# Patient Record
Sex: FEMALE | Race: WHITE | NOT HISPANIC OR LATINO | Employment: FULL TIME | URBAN - METROPOLITAN AREA
[De-identification: names, ages, dates, MRNs, and addresses within clinical notes are randomized per-mention and may not be internally consistent; named-entity substitution may affect disease eponyms.]

---

## 2017-01-30 RX ORDER — LEVOTHYROXINE SODIUM 150 UG/1
TABLET ORAL
Qty: 90 TABLET | Refills: 1 | Status: SHIPPED | OUTPATIENT
Start: 2017-01-30 | End: 2017-02-07 | Stop reason: SDUPTHER

## 2017-02-03 ENCOUNTER — CLINICAL SUPPORT (OUTPATIENT)
Dept: CARDIOLOGY | Facility: CLINIC | Age: 47
End: 2017-02-03
Payer: COMMERCIAL

## 2017-02-03 ENCOUNTER — NUTRITION (OUTPATIENT)
Dept: DIABETES | Facility: CLINIC | Age: 47
End: 2017-02-03
Payer: COMMERCIAL

## 2017-02-03 ENCOUNTER — CLINICAL SUPPORT (OUTPATIENT)
Dept: INTERNAL MEDICINE | Facility: CLINIC | Age: 47
End: 2017-02-03

## 2017-02-03 ENCOUNTER — OFFICE VISIT (OUTPATIENT)
Dept: INTERNAL MEDICINE | Facility: CLINIC | Age: 47
End: 2017-02-03
Payer: COMMERCIAL

## 2017-02-03 ENCOUNTER — HOSPITAL ENCOUNTER (OUTPATIENT)
Dept: RADIOLOGY | Facility: HOSPITAL | Age: 47
Discharge: HOME | End: 2017-02-03
Attending: INTERNAL MEDICINE
Payer: COMMERCIAL

## 2017-02-03 VITALS
WEIGHT: 198 LBS | BODY MASS INDEX: 31.82 KG/M2 | DIASTOLIC BLOOD PRESSURE: 68 MMHG | HEIGHT: 66 IN | SYSTOLIC BLOOD PRESSURE: 117 MMHG

## 2017-02-03 VITALS
SYSTOLIC BLOOD PRESSURE: 100 MMHG | HEIGHT: 66 IN | DIASTOLIC BLOOD PRESSURE: 70 MMHG | BODY MASS INDEX: 31.53 KG/M2 | HEART RATE: 72 BPM | RESPIRATION RATE: 12 BRPM | WEIGHT: 196.19 LBS

## 2017-02-03 VITALS
WEIGHT: 196.19 LBS | HEIGHT: 66 IN | RESPIRATION RATE: 12 BRPM | SYSTOLIC BLOOD PRESSURE: 100 MMHG | TEMPERATURE: 97 F | DIASTOLIC BLOOD PRESSURE: 70 MMHG | BODY MASS INDEX: 31.53 KG/M2 | HEART RATE: 72 BPM

## 2017-02-03 DIAGNOSIS — Z00.00 ROUTINE GENERAL MEDICAL EXAMINATION AT A HEALTH CARE FACILITY: ICD-10-CM

## 2017-02-03 DIAGNOSIS — Z00.00 ROUTINE GENERAL MEDICAL EXAMINATION AT A HEALTH CARE FACILITY: Primary | ICD-10-CM

## 2017-02-03 DIAGNOSIS — E03.9 ACQUIRED HYPOTHYROIDISM: Primary | ICD-10-CM

## 2017-02-03 DIAGNOSIS — Z00.00 ENCOUNTER FOR PREVENTIVE HEALTH EXAMINATION: ICD-10-CM

## 2017-02-03 DIAGNOSIS — M25.552 PELVIC JOINT PAIN, LEFT: ICD-10-CM

## 2017-02-03 DIAGNOSIS — Z80.41 FH: OVARIAN CANCER: ICD-10-CM

## 2017-02-03 DIAGNOSIS — Z00.8 NUTRITIONAL ASSESSMENT: Primary | ICD-10-CM

## 2017-02-03 LAB
ALBUMIN SERPL BCP-MCNC: 3.7 G/DL
ALP SERPL-CCNC: 79 U/L
ALT SERPL W/O P-5'-P-CCNC: 20 U/L
ANION GAP SERPL CALC-SCNC: 12 MMOL/L
AST SERPL-CCNC: 20 U/L
BILIRUB SERPL-MCNC: 0.3 MG/DL
BUN SERPL-MCNC: 10 MG/DL
CALCIUM SERPL-MCNC: 9.6 MG/DL
CHLORIDE SERPL-SCNC: 106 MMOL/L
CHOLEST/HDLC SERPL: 3.1 {RATIO}
CO2 SERPL-SCNC: 23 MMOL/L
CREAT SERPL-MCNC: 0.8 MG/DL
ERYTHROCYTE [DISTWIDTH] IN BLOOD BY AUTOMATED COUNT: 12.5 %
EST. GFR  (AFRICAN AMERICAN): >60 ML/MIN/1.73 M^2
EST. GFR  (NON AFRICAN AMERICAN): >60 ML/MIN/1.73 M^2
GLUCOSE SERPL-MCNC: 94 MG/DL
HCT VFR BLD AUTO: 39.6 %
HDL/CHOLESTEROL RATIO: 32.8 %
HDLC SERPL-MCNC: 174 MG/DL
HDLC SERPL-MCNC: 57 MG/DL
HGB BLD-MCNC: 13.4 G/DL
LDLC SERPL CALC-MCNC: 104 MG/DL
MCH RBC QN AUTO: 30.7 PG
MCHC RBC AUTO-ENTMCNC: 33.8 %
MCV RBC AUTO: 91 FL
NONHDLC SERPL-MCNC: 117 MG/DL
PLATELET # BLD AUTO: 336 K/UL
PMV BLD AUTO: 11.1 FL
POTASSIUM SERPL-SCNC: 4 MMOL/L
PROT SERPL-MCNC: 7 G/DL
RBC # BLD AUTO: 4.36 M/UL
SODIUM SERPL-SCNC: 141 MMOL/L
T4 FREE SERPL-MCNC: 1.07 NG/DL
TRIGL SERPL-MCNC: 65 MG/DL
TSH SERPL DL<=0.005 MIU/L-ACNC: 7.02 UIU/ML
WBC # BLD AUTO: 5.16 K/UL

## 2017-02-03 PROCEDURE — 90471 IMMUNIZATION ADMIN: CPT | Mod: PBBFAC,PO | Performed by: INTERNAL MEDICINE

## 2017-02-03 PROCEDURE — 71020 XR CHEST PA AND LATERAL: CPT | Mod: 26,,, | Performed by: RADIOLOGY

## 2017-02-03 PROCEDURE — 71020 XR CHEST PA AND LATERAL: CPT | Mod: TC,PO

## 2017-02-03 PROCEDURE — 83036 HEMOGLOBIN GLYCOSYLATED A1C: CPT

## 2017-02-03 PROCEDURE — 93017 CV STRESS TEST TRACING ONLY: CPT | Mod: PBBFAC,PO | Performed by: INTERNAL MEDICINE

## 2017-02-03 PROCEDURE — 85027 COMPLETE CBC AUTOMATED: CPT | Mod: PO

## 2017-02-03 PROCEDURE — 99999 PR PBB SHADOW E&M-EST. PATIENT-LVL II: CPT | Mod: PBBFAC,,, | Performed by: DIETITIAN, REGISTERED

## 2017-02-03 PROCEDURE — 93010 ELECTROCARDIOGRAM REPORT: CPT | Mod: S$PBB,,, | Performed by: INTERNAL MEDICINE

## 2017-02-03 PROCEDURE — 84439 ASSAY OF FREE THYROXINE: CPT

## 2017-02-03 PROCEDURE — 97802 MEDICAL NUTRITION INDIV IN: CPT | Mod: PBBFAC,PO | Performed by: DIETITIAN, REGISTERED

## 2017-02-03 PROCEDURE — 90715 TDAP VACCINE 7 YRS/> IM: CPT | Mod: PBBFAC,PO | Performed by: INTERNAL MEDICINE

## 2017-02-03 PROCEDURE — 93016 CV STRESS TEST SUPVJ ONLY: CPT | Mod: S$PBB,,, | Performed by: INTERNAL MEDICINE

## 2017-02-03 PROCEDURE — 80061 LIPID PANEL: CPT | Mod: PO

## 2017-02-03 PROCEDURE — 93005 ELECTROCARDIOGRAM TRACING: CPT | Mod: PBBFAC,PO | Performed by: INTERNAL MEDICINE

## 2017-02-03 PROCEDURE — 99999 PR PBB SHADOW E&M-EST. PATIENT-LVL III: CPT | Mod: PBBFAC,,, | Performed by: INTERNAL MEDICINE

## 2017-02-03 PROCEDURE — 99396 PREV VISIT EST AGE 40-64: CPT | Mod: S$PBB,,, | Performed by: INTERNAL MEDICINE

## 2017-02-03 PROCEDURE — 80053 COMPREHEN METABOLIC PANEL: CPT | Mod: PO

## 2017-02-03 PROCEDURE — 86703 HIV-1/HIV-2 1 RESULT ANTBDY: CPT

## 2017-02-03 PROCEDURE — 84443 ASSAY THYROID STIM HORMONE: CPT

## 2017-02-03 PROCEDURE — 93018 CV STRESS TEST I&R ONLY: CPT | Mod: S$PBB,,, | Performed by: INTERNAL MEDICINE

## 2017-02-03 NOTE — PROGRESS NOTES
"Subjective:       Patient ID: Bing Mclaughlin is a 46 y.o. female.    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  Menstrual migraines, relief with fioricet.  Energy good.  Walks 3x per week on treadmill.  Some LBP x 2 years, worse with some exercises.  No LE radiation.  Also left pelvic area pain.  No f/c/sw/cough.  No cp/sob/palp.  BMs normal.  Urine normal.    Past Medical History:    Hypothyroid                                                   Migraines                                                     Past Surgical History:    TUBAL LIGATION                                                 WRIST SURGERY                                                    Comment:right, cyst removed.    SH:  No tob, occas EtOH, ,  in chem plant.    FH:  Mother ovarian CA at 63, cousin with leukemia, cousin breast CA.    HM:  No fluvax this season, 1/17 today TDaP, 1/16 MMG, 4/16 Gyn Dr. Hackett.    Review of Systems   Constitutional: Negative for appetite change, chills, diaphoresis and fever.   HENT: Negative for congestion, ear pain, rhinorrhea, sinus pressure and sore throat.    Respiratory: Negative for cough, chest tightness and shortness of breath.    Cardiovascular: Negative for chest pain and palpitations.   Gastrointestinal: Negative for blood in stool, constipation, diarrhea, nausea and vomiting.   Genitourinary: Negative for dysuria, frequency, hematuria, menstrual problem, urgency and vaginal discharge.   Musculoskeletal: Negative for arthralgias.   Skin: Negative for rash.   Neurological: Negative for dizziness and headaches.   Psychiatric/Behavioral: Negative for sleep disturbance. The patient is not nervous/anxious.        Objective:     Visit Vitals    /70    Pulse 72    Temp 96.6 °F (35.9 °C) (Tympanic)    Resp 12    Ht 5' 5.5" (1.664 m)    Wt 89 kg (196 lb 3.4 oz)    BMI 32.15 kg/m2       Physical Exam "   Constitutional: She is oriented to person, place, and time. She appears well-developed and well-nourished.   HENT:   Right Ear: External ear normal. Tympanic membrane is not injected.   Left Ear: External ear normal. Tympanic membrane is not injected.   Mouth/Throat: Oropharynx is clear and moist.   Eyes: Conjunctivae are normal.   Neck: Normal range of motion. Neck supple. No thyromegaly present.   Cardiovascular: Normal rate, regular rhythm and intact distal pulses.  Exam reveals no gallop and no friction rub.    No murmur heard.  Pulmonary/Chest: Effort normal and breath sounds normal. She has no wheezes. She has no rales.   Abdominal: Soft. Bowel sounds are normal. She exhibits no mass. There is no tenderness.   Musculoskeletal: She exhibits no edema.   Lymphadenopathy:     She has no cervical adenopathy.   Neurological: She is alert and oriented to person, place, and time.   Skin: Skin is warm. No rash noted.   Psychiatric: She has a normal mood and affect.       Results for orders placed or performed in visit on 02/03/17   Comprehensive metabolic panel   Result Value Ref Range    Sodium 141 136 - 145 mmol/L    Potassium 4.0 3.5 - 5.1 mmol/L    Chloride 106 95 - 110 mmol/L    CO2 23 23 - 29 mmol/L    Glucose 94 70 - 110 mg/dL    BUN, Bld 10 6 - 20 mg/dL    Creatinine 0.8 0.5 - 1.4 mg/dL    Calcium 9.6 8.7 - 10.5 mg/dL    Total Protein 7.0 6.0 - 8.4 g/dL    Albumin 3.7 3.5 - 5.2 g/dL    Total Bilirubin 0.3 0.1 - 1.0 mg/dL    Alkaline Phosphatase 79 55 - 135 U/L    AST 20 10 - 40 U/L    ALT 20 10 - 44 U/L    Anion Gap 12 8 - 16 mmol/L    eGFR if African American >60 >60 mL/min/1.73 m^2    eGFR if non African American >60 >60 mL/min/1.73 m^2   Hematology Profile   Result Value Ref Range    WBC 5.16 3.90 - 12.70 K/uL    RBC 4.36 4.00 - 5.40 M/uL    Hemoglobin 13.4 12.0 - 16.0 g/dL    Hematocrit 39.6 37.0 - 48.5 %    MCV 91 82 - 98 fL    MCH 30.7 27.0 - 31.0 pg    MCHC 33.8 32.0 - 36.0 %    RDW 12.5 11.5 - 14.5 %     Platelets 336 150 - 350 K/uL    MPV 11.1 9.2 - 12.9 fL   Lipid panel   Result Value Ref Range    Cholesterol 174 120 - 199 mg/dL    Triglycerides 65 30 - 150 mg/dL    HDL 57 40 - 75 mg/dL    LDL Cholesterol 104.0 63.0 - 159.0 mg/dL    HDL/Chol Ratio 32.8 20.0 - 50.0 %    Total Cholesterol/HDL Ratio 3.1 2.0 - 5.0    Non-HDL Cholesterol 117 mg/dL     CXR pending.  EKG- normal.  ETT pending.    Assessment:       1. Acquired hypothyroidism    2. Encounter for preventive health examination    3. Pelvic joint pain, left    4. FH: ovarian cancer        Plan:       Bing was seen today for [employer] health.    Diagnoses and all orders for this visit:    Acquired hypothyroidism- await lab.    Pelvic pain and tenderness left side, fh ovarian CA- u/s with [facility].    Encounter for preventive health examination- TDaP now- Report other results when available.

## 2017-02-03 NOTE — MR AVS SNAPSHOT
[facility]  [address] [place] [address]  Phone: [phone]  Fax: [phone]                  Bing Mclaughlin   2/3/2017 10:40 AM   Office Visit    Description:  Female : 1970   Provider:  Kirsten Paniagua MD   Department:  [facility] - Internal Medicine           Reason for Visit     Executive Health           Diagnoses this Visit        Comments    Acquired hypothyroidism    -  Primary     Encounter for preventive health examination         Pelvic joint pain, left         FH: ovarian cancer                To Do List           Future Appointments        Provider Department Dept Phone    2/3/2017 3:30 PM Fely Ojeda RD, CDE [facility] - Diabetes [facility] [phone]    2/10/2017 8:45 AM [facility] [phone]      Goals (5 Years of Data)     None      Follow-Up and Disposition     Return if symptoms worsen or fail to improve.      [facility]     [facility] Nurse Care Line -  Assistance  Registered nurses in the [facility] provide clinical advisement, health education, appointment booking, and other advisory services.  Call for this free service at [phone].             Medications           Message regarding Medications     Verify the changes and/or additions to your medication regime listed below are the same as discussed with your clinician today.  If any of these changes or additions are incorrect, please notify your healthcare provider.             Verify that the below list of medications is an accurate representation of the medications you are currently taking.  If none reported, the list may be blank. If incorrect, please contact your healthcare provider. Carry this list with you in case of emergency.           Current Medications     butalbital-aspirin-caffeine (BUTALBITAL COMPOUND) -40 mg Tab Take 1 tablet by mouth 2 (two) times daily as needed.    levothyroxine (SYNTHROID) 150 MCG tablet  "Take 1 tablet by mouth before breakfast ON AN EMPTY STOMACH           Clinical Reference Information           Your Vitals Were     BP Pulse Temp Resp Height Weight    100/70 72 96.6 °F (35.9 °C) (Tympanic) 12 5' 5.5" (1.664 m) 89 kg (196 lb 3.4 oz)    BMI                32.15 kg/m2          Blood Pressure          Most Recent Value    BP  100/70      Allergies as of 2/3/2017     No Known Allergies      Immunizations Administered on Date of Encounter - 2/3/2017     Name Date Dose VIS Date Route    TDAP 2/3/2017 0.5 mL 2/24/2015 Intramuscular      Orders Placed During Today's Visit      Normal Orders This Visit    Tdap Vaccine (Adult)     Future Labs/Procedures Expected by Expires    US Pelvis Complete Non OB  2/3/2017 5/4/2017      Language Assistance Services     ATTENTION: Language assistance services are available, free of charge. Please call [phone].      ATENCIÓN: Si habla español, tiene a guevara disposición servicios gratuitos de asistencia lingüística. Llame al [phone].     CHÚ Ý: N?u b?n nói Ti?ng Vi?t, có các d?ch v? h? tr? ngôn ng? mi?n phí dành cho b?n. G?i s? [phone].         [facility] complies with applicable Federal civil rights laws and does not discriminate on the basis of race, color, national origin, age, disability, or sex.        "

## 2017-02-03 NOTE — LETTER
February 7, 2017    Bing Mclaughlin  [address]  [place] [address]             [facility]  [address] [place] [address]  Phone: [phone]  Fax: [phone] [facility] #[id number]     Dear Ms. Mclaughlin:    It was a pleasure to meet you and perform your  executive physical on February 3, 2017.  At the time of  your visit, you are 46 years old.  You have had some  menstrual migraines.  You report good energy.  You have  been walking three days per week on the treadmill.  You  have had some left pelvic area pain and have a family  history of ovarian cancer.  You report no fevers,  sweats, or coughing problems.  No chest pains, problems  breathing, or fast heartbeat.  Your bowel and bladder  function has been normal.    Your past medical history is significant for  hypothyroidism and migraines.  You have had a tubal  ligation and right wrist surgery.    Your health maintenance includes no Fluvax this season.   January 2016 mammogram, April 2016 gynecologic exam  with Dr. Hackett.    On physical exam, your height is 5 feet, 5.5 inches,  weight 196 pounds with a body mass index 32.15.  This  is in the overweight range.  Your blood pressure is  100/70, pulse 72, and temperature 96.6.  Your general  appearance is well developed with no distress.  Your  head and neck exam is normal.  Your heart has a regular  rate and rhythm with no abnormal sounds.  Your lungs  are clear throughout with a normal respiratory effort.   Your abdomen is soft with normal bowel sounds.  No mass  or enlarged organs are noted.  Your extremities have  strong distal pulses with no swelling.    Your lab work reveals a normal blood count.  Your  kidney and liver function is normal.  Your fasting  glucose is 94, normal.  Total cholesterol is 174, HDL  57, , and triglycerides 65.  This is a very good  cholesterol panel.  Hemoglobin A1c is 5.2%, normal.   HIV test is negative.  TSH is elevated at  7.021 with a  normal free T4.  Your chest x-ray is normal.  Your EKG  is normal.  Your exercise treadmill stress test is  normal.    In summary, you appear to be in fairly good health.  We  will optimize your thyroid function and recheck that  with Dr. Baca in about two months.  I will send the  prescription for 175 mcg daily to [facility]  and have my nurse get you scheduled for the follow-up  lab work.  We gave you the tetanus, diphtheria,  pertussis vaccination in the clinic, which has you  current on all vaccinations needed at this time.  We  scheduled a pelvic ultrasound which I will report to  you after that test is completed.  Otherwise, you are  up-to-date on all preventive care needed at your age.   Please continue your exercise to continue your good  cholesterol and overall vascular health.    It was a pleasure to see you and perform your executive  physical.  If I can be of any further assistance or if  you have any other questions or concerns, please do not  hesitate to let me know.    Yours very truly,      Kirsten Paniagua M.D.        MT  dd: 02/07/2017 12:49:58 (CST)  td: 02/08/2017 00:56:10 (CST)  Doc ID   #[id number]  Job ID #[id number]    CC:

## 2017-02-03 NOTE — PROGRESS NOTES
"PCP: Ousmane Baca MD   REFERRING PROVIDER: No ref. provider found     HISTORY OF PRESENT ILLNESS: 46 y.o. female patient is in clinic today for executive health visit.    VITAL SIGNS:  Height: 5' 5.5" (166.4 cm)   Weight: 89.8 kg (197 lb 15.6 oz)   IBW: 128 lbs +/-10%    ALLERGIES & MEDICATIONS: Reviewed and Reconciled  MEDICAL/SURGICAL & FAMILY HISTORY: Reviewed and Reconciled    LABORATORY: Reviewed Diabetes Management Flowsheet and Results Review.    SELF-MONITORING: Food - none are avail    ACTIVITY LEVEL: none regular    NUTRITION INTAKE: Meal patterns include 3 meals, 1-2 snacks daily with usual intake ~1800cals/d. Patient is not limiting carbohydrates (added sugar). No excess saturated fat or sodium. Adequate intakes of fruits, vegetables, whole grains.    PSYCHOSOCIAL: Stage of change - action  Barriers to change - none.    EDUCATIONAL ASSESSMENT:   1. Impediments in learning class environment - NONE.  2. Needs improvement in self care management skills.  -healthy eating  -being active  -self-monitoring  -taking medication  -problem solving  -healthy coping  -reducing risks    MNT ASSESSMENT:   3393-5006 calories, 5 ounces protein daily  15-30 grams carb/meal, 5-15 grams carb/snack  increase fruit 2 serv/d, vegetables 2+ cups/d, whole grains 3+serv/d, lowfat dairy 3+serv/d  low-fat, low-sodium  150 min physical activity per week, moderate intensity, as tolerated    PLAN:   Reviewed MNT guidelines for general wellness.    Encouraged daily self-monitoring of food and activity patterns, return records to clinic.   Provided meal-planning instruction via foodlists, plate method, food models, food labels and/or ADA booklet. Reviewed micro/macronutrient effect on health management. Discussed carbohydrate counting and spacing techniques with emphasis on cardiovascular wellness health strategies, functional foods. Reviewed principles of energy metabolism to assist weight and health management.    Discussed " importance of daily physical activity with review of benefits, methods, guidelines and precautions.   Discussed behavioral strategies for improving social and environmental support of lifestyle changes.    GOALS: Self-monitoring: Daily food & activity journal. Meal Plan: 90% Accuracy. Activity:150 min/wk.    Visit Time Spent:  60 minutes  Thank you for the opportunity to work with your patient.

## 2017-02-04 LAB
DIASTOLIC DYSFUNCTION: NO
ESTIMATED AVG GLUCOSE: 103 MG/DL
HBA1C MFR BLD HPLC: 5.2 %

## 2017-02-06 ENCOUNTER — PATIENT MESSAGE (OUTPATIENT)
Dept: INTERNAL MEDICINE | Facility: CLINIC | Age: 47
End: 2017-02-06

## 2017-02-06 LAB — HIV 1+2 AB+HIV1 P24 AG SERPL QL IA: NEGATIVE

## 2017-02-07 ENCOUNTER — TELEPHONE (OUTPATIENT)
Dept: INTERNAL MEDICINE | Facility: CLINIC | Age: 47
End: 2017-02-07

## 2017-02-07 RX ORDER — LEVOTHYROXINE SODIUM 175 UG/1
175 TABLET ORAL
Qty: 30 TABLET | Refills: 6 | Status: SHIPPED | OUTPATIENT
Start: 2017-02-07 | End: 2018-05-11

## 2017-02-16 ENCOUNTER — TELEPHONE (OUTPATIENT)
Dept: RADIOLOGY | Facility: HOSPITAL | Age: 47
End: 2017-02-16

## 2017-02-17 ENCOUNTER — HOSPITAL ENCOUNTER (OUTPATIENT)
Dept: RADIOLOGY | Facility: HOSPITAL | Age: 47
Discharge: HOME | End: 2017-02-17
Attending: INTERNAL MEDICINE
Payer: COMMERCIAL

## 2017-02-17 DIAGNOSIS — Z80.41 FH: OVARIAN CANCER: ICD-10-CM

## 2017-02-17 DIAGNOSIS — M25.552 PELVIC JOINT PAIN, LEFT: ICD-10-CM

## 2017-02-17 PROCEDURE — 76856 US EXAM PELVIC COMPLETE: CPT | Mod: 26,,, | Performed by: RADIOLOGY

## 2017-02-17 PROCEDURE — 76856 US EXAM PELVIC COMPLETE: CPT | Mod: TC,PO

## 2017-02-17 PROCEDURE — 76830 TRANSVAGINAL US NON-OB: CPT | Mod: 26,,, | Performed by: RADIOLOGY

## 2017-03-03 ENCOUNTER — OFFICE VISIT (OUTPATIENT)
Dept: OBSTETRICS AND GYNECOLOGY | Facility: CLINIC | Age: 47
End: 2017-03-03
Payer: COMMERCIAL

## 2017-03-03 VITALS — BODY MASS INDEX: 33.66 KG/M2 | WEIGHT: 202 LBS | HEIGHT: 65 IN

## 2017-03-03 DIAGNOSIS — N83.209 CYST OF OVARY, UNSPECIFIED LATERALITY: Primary | ICD-10-CM

## 2017-03-03 DIAGNOSIS — R10.2 PELVIC PAIN IN FEMALE: ICD-10-CM

## 2017-03-03 PROCEDURE — 1160F RVW MEDS BY RX/DR IN RCRD: CPT | Mod: S$GLB,,, | Performed by: OBSTETRICS & GYNECOLOGY

## 2017-03-03 PROCEDURE — 99999 PR PBB SHADOW E&M-EST. PATIENT-LVL II: CPT | Mod: PBBFAC,,, | Performed by: OBSTETRICS & GYNECOLOGY

## 2017-03-03 PROCEDURE — 99213 OFFICE O/P EST LOW 20 MIN: CPT | Mod: S$GLB,,, | Performed by: OBSTETRICS & GYNECOLOGY

## 2017-03-03 NOTE — PATIENT INSTRUCTIONS
Ovarian Cysts    Ovarian cysts are sacs filled with fluid or tissue that form on or inside the ovaries. The ovaries are two small organs located on each side of a womans uterus (womb). They are part of the female reproductive system.  Ovarian cysts are common in women, especially during childbearing years. There are different types of cysts. Most are harmless (benign) and go away on their own. They often cause no symptoms. If symptoms do occur, they can include mild pain or pressure in the lower belly (abdomen).  Cysts that are large or break (rupture) may cause more severe pain and symptoms. In these cases, hospital care or treatment such as surgery may be needed. More extensive treatment may also be needed if a cyst causes an ovary to twist (called torsion) or if a cyst is suspected to be cancerous. Keep in mind that most cysts are not cancerous, however.  General care  · To help relieve pain, your healthcare provider may recommend using over-the-counter pain medicine. If needed, stronger pain medicine may be prescribed.  · Depending on the type of cyst you have, your healthcare provider may advise taking birth control pills. These help shrink cysts in certain cases. They may also help prevent new cysts from forming. Be sure to take these medicines as directed if they are prescribed.  · Your healthcare provider may advise you to watch your symptoms over time to see if they go away or worsen. Regular ultrasound tests may also be advised. These can help check if a cyst goes away or grows in size.  Follow-up care  Follow up with your healthcare provider, or as advised.  When to seek medical advice  Call your healthcare provider right away if any of these occur:  · Pain worsens or fails to get better with home treatment  · Fever of 100.4°F (38°C) or higher (or other fever amount directed by your healthcare provider)  · Nausea and vomiting  · Weakness, dizziness, or fainting  · Abnormal vaginal bleeding  Date Last  Reviewed: 6/11/2015  © 4956-0007 [employer]. [address] [place] [address]. All rights reserved. This information is not intended as a substitute for professional medical care. Always follow your healthcare professional's instructions.        Reasons for Pelvic Laparoscopy  Pelvic laparoscopy allows your doctor a direct view of the reproductive organs. He or she can see whats causing problems. Problems may include pain, bleeding, or trouble getting pregnant. Treatment may happen as part of the same surgery. It depends on the cause of the problem. You'll discuss this with your doctor before surgery. Conditions often diagnosed and treated by laparoscopy are shown below.      · Tubal pregnancy. This occurs when an embryo implants in a fallopian tube. Untreated, the tube can rupture and bleed.  · A fibroid (lump of uterine muscle tissue). This can cause pain and bleeding.  · Endometriosis (growth of uterine lining tissue outside the uterus). This can lead to pain, bleeding, and trouble getting pregnant.  · A cyst (fluid-filled sac) or tumor (abnormal growth). These can form on the ovary. They can cause pain and other health problems.  · Adhesions (scar tissue). These can cause pain and trouble getting pregnant.  · A blocked or damaged fallopian tube. This can cause trouble getting pregnant.  · Sterilization. To provide permanent birth control.  · Hysterectomy. Removing the uterus with or without removing the ovaries or fallopian tubes.  · Pelvic organ prolapse. This is when the female organs drop into or out of the vagina.  · Incontinence. This is when urine leaks unintentionally.  Date Last Reviewed: 5/21/2015  © 2837-3253 [employer]. [address] [place] [address]. All rights reserved. This information is not intended as a substitute for professional medical care. Always follow your healthcare professional's instructions.        Understanding Ovarian  Cystectomy    An ovarian cystectomy is a type of surgery. It removes a cyst from your ovaries. A cyst is a sac full of fluid. You have two ovaries, one on each side of your uterus. The ovaries make your eggs (ova). They also make the hormone estrogen.  How to say it  [id number]   Why ovarian cystectomy is done  This procedure is done to remove a cyst on an ovary. Its usually done only if the cyst is large or painful. Many women have cysts on their ovaries. They are often benign. But they can be cancerous.  How ovarian cystectomy is done  This procedure is often done in a hospital. You may need to spend a few days in the hospital after the cyst is removed. During the procedure:  · You are given medicine to make you fall asleep. You wont feel any pain.  · The surgeon makes a cut (incision) in your belly (abdomen) to reach your reproductive organs.  · The surgeon puts a clamp on the ovary to hold it still.  · The surgeon carefully cuts into the ovary, revealing the cyst.  · The surgeon may drain the cyst. It is then cut away from the ovary.  · The cyst may be sent to a lab to check for disease, such as cancer.  · The surgeon stops any bleeding from the ovary. He or she then closes the cut in your belly.  Risks of ovarian cystectomy  · Bleeding  · Damage to an ovary  · Infection  · Injury to the bladder  Date Last Reviewed: 6/1/2016  © 5125-6604 [employer]. [address] [place] [address]. All rights reserved. This information is not intended as a substitute for professional medical care. Always follow your healthcare professional's instructions.        Understanding Ovarian Cystectomy    An ovarian cystectomy is a type of surgery. It removes a cyst from your ovaries. A cyst is a sac full of fluid. You have two ovaries, one on each side of your uterus. The ovaries make your eggs (ova). They also make the hormone estrogen.  How to say it  [id number]   Why ovarian cystectomy is  done  This procedure is done to remove a cyst on an ovary. Its usually done only if the cyst is large or painful. Many women have cysts on their ovaries. They are often benign. But they can be cancerous.  How ovarian cystectomy is done  This procedure is often done in a hospital. You may need to spend a few days in the hospital after the cyst is removed. During the procedure:  · You are given medicine to make you fall asleep. You wont feel any pain.  · The surgeon makes a cut (incision) in your belly (abdomen) to reach your reproductive organs.  · The surgeon puts a clamp on the ovary to hold it still.  · The surgeon carefully cuts into the ovary, revealing the cyst.  · The surgeon may drain the cyst. It is then cut away from the ovary.  · The cyst may be sent to a lab to check for disease, such as cancer.  · The surgeon stops any bleeding from the ovary. He or she then closes the cut in your belly.  Risks of ovarian cystectomy  · Bleeding  · Damage to an ovary  · Infection  · Injury to the bladder  Date Last Reviewed: 6/1/2016  © 7266-9582 [employer]. [address] [place] [address], [place] [address]. All rights reserved. This information is not intended as a substitute for professional medical care. Always follow your healthcare professional's instructions.

## 2017-03-03 NOTE — PROGRESS NOTES
CHIEF COMPLAINT:   Chief Complaint   Patient presents with    Ovarian Cyst       HISTORY OF PRESENT ILLNESS    Bing Mclaughlin 46 y.o.  complains of:  Location:  LLQ pelvic pain   Quality: constant dull with intermittent debilitating sharp  Severity: up to 8-10/10  Duration: for past 6mo gradually getting more severe  Timing: random  Context: random  Modifying factors: standing up.  Associating signs and symptoms: worse w menses. pt also has back pain seeing a chiropractor for this, feels she has a good handle on the back pain and she and chiropractor feel there is a deeper pelvic issue that is separate from the back. In fact she did not c/o this to me in the past, thinking this pain was all due to her back pain.       BM regular and not associated w pain (pain doesn't worsen w BM). No diverticulosis hx. No wt loss.     No success in past w ocp in past for any reason (contraception, regulating menses or for ov cysts) due to various side effects. Does not want to try medical therapy again.   Desires definitive therapy.  She has a history of painful menses.     HISTORY  Patient Active Problem List   Diagnosis    Migraines    Acquired hypothyroidism       Past Medical History:   Diagnosis Date    Hypothyroid     Migraines        Past Surgical History:   Procedure Laterality Date    TUBAL LIGATION      WRIST SURGERY      right, cyst removed.       Family History   Problem Relation Age of Onset    Uterine cancer Mother     Cancer Mother     Cancer Sister     Deep vein thrombosis Neg Hx      labor Neg Hx     Breast cancer Neg Hx        Social History     Social History    Marital status:      Spouse name: N/A    Number of children: N/A    Years of education: N/A     Social History Main Topics    Smoking status: Never Smoker    Smokeless tobacco: Never Used    Alcohol use 0.0 oz/week     0 Standard drinks or equivalent per week      Comment: occ    Drug use: No    Sexual activity:  "Yes     Partners: Male     Birth control/ protection: Surgical      Comment: tubal     Other Topics Concern    None     Social History Narrative       Current Outpatient Prescriptions   Medication Sig Dispense Refill    butalbital-aspirin-caffeine (BUTALBITAL COMPOUND) -40 mg Tab Take 1 tablet by mouth 2 (two) times daily as needed. 30 tablet 1    levothyroxine (SYNTHROID, LEVOTHROID) 175 MCG tablet Take 1 tablet (175 mcg total) by mouth before breakfast. 30 tablet 6     No current facility-administered medications for this visit.        Review of patient's allergies indicates:  No Known Allergies        PHYSICAL EXAM     Vitals:    03/03/17 1518   Weight: 91.6 kg (202 lb)   Height: 5' 5" (1.651 m)      PAIN SCALE: 0/10 None    ROS:  GENERAL: No fever, chills, fatigability or weight loss.  ABDOMEN: Appetite fine. No weight loss. Denies diarrhea,   hematemesis or blood in stool.  URINARY: No flank pain, dysuria or hematuria.  REPRODUCTIVE: No abnormal vaginal bleeding.    BREASTS: Breasts symmetric, nontender and no lumps detected.    PE:   APPEARANCE: Well nourished, well developed, in no acute distress.    Findings: The uterus is retroverted and measured 11.1 x 4 x 4.8 cm and appeared to be normal.  The endometrium was 9.8 mm in thickness.  The right ovary measured 2.6 x 2 x 1.8 cm and contains a simple cyst measuring 2.4 x 1.3 x 1.6 cm.  The left ovary measured 4 x 2.5 x 2.4 cm and contains a complex cyst measuring 2.3 x 1.9 x 2.2 cm.  There was no free fluid or adnexal mass identified.   Impression    Bilateral ovarian cysts with a complex cyst on the left ovary. Followup as clinically warranted..             DIAGNOSIS:   1. Bilateral ov cysts  2. Chronic pelvic pain  3. Suspect endometriosis      PLAN:   Reviewed rx options: obs med surg. Pt interested in diagnostic LSC with ov cystectomy possible endometriosis resection          COUNSELING:  Discussed with patient the risks of surgery, including but " not limited to, risks of anesthesia, infection, bleeding, injury to other organs, such as skin, nerves, arteries, veins, bowel, bladder, ureters, with possible need for reparative or subsequent surgery such as bowel resection/repair, hernia, colostomy, bladder/ureter surgery, blood transfusion, possible hysterectomy. HRT with its inherent risks ie MI, PE, DVT, CVA, BRCA was discussed w/ risk of BSO/hysterectomy. There is also risks of development of deep venous thrombosis, pulmonary embolus, myocardial infarction, possible death. The patient verbalizes understanding. Discussed with the Patient the risks/benefits/alternatives of the treatment options.  Consents were signed. Pamphlets given.      FOLLOW-UP: With me for preop

## 2017-03-06 ENCOUNTER — TELEPHONE (OUTPATIENT)
Dept: OBSTETRICS AND GYNECOLOGY | Facility: CLINIC | Age: 47
End: 2017-03-06

## 2017-03-06 ENCOUNTER — PATIENT MESSAGE (OUTPATIENT)
Dept: OBSTETRICS AND GYNECOLOGY | Facility: CLINIC | Age: 47
End: 2017-03-06

## 2017-03-06 DIAGNOSIS — N83.209 CYST OF OVARY, UNSPECIFIED LATERALITY: Primary | ICD-10-CM

## 2017-03-06 DIAGNOSIS — R10.2 PELVIC PAIN IN FEMALE: ICD-10-CM

## 2017-03-22 ENCOUNTER — HOSPITAL ENCOUNTER (OUTPATIENT)
Dept: PREADMISSION TESTING | Facility: HOSPITAL | Age: 47
Discharge: HOME | End: 2017-03-22
Attending: OBSTETRICS & GYNECOLOGY
Payer: COMMERCIAL

## 2017-03-22 VITALS — BODY MASS INDEX: 31.82 KG/M2 | HEIGHT: 66 IN | WEIGHT: 198 LBS

## 2017-03-22 NOTE — IP AVS SNAPSHOT
[facility]  [id number] [place] [address] [address] [place] [address]           Patient Discharge Instructions    Our goal is to set you up for success. This packet includes information on your condition, medications, and your home care. It will help you to care for yourself so you don't get sicker.     Please ask your nurse if you have any questions.        There are many details to remember when preparing for your surgery. Here is what you will need to do, please ask your nurse if there are more specific instructions and if you have any questions:    1. 24 hours before procedure Do not smoke or drink alcoholic beverages 24 hours prior to your procedure    2. Eating before procedure Do not eat or drink anything 8 hours before your procedure - this includes gum, mints, and candy.     3. Day of procedure Please remove all jewelry for the procedure. If you wear contact lenses, dentures, hearing aids or glasses, bring a container to put them in during your surgery and give to a family member for safekeeping.  If your doctor has scheduled you for an overnight stay, bring a small overnight bag with any personal items that you need.    4. After procedure Make arrangements in advance for transportation home by a responsible adult. It is not safe to drive a vehicle during the 24 hours following surgery.     PLEASE NOTE: You may be contacted the day before your surgery to confirm your surgery date and arrival time. The Surgery schedule has many variables which may affect the time of your surgery case. Family members should be available if your surgery time changes.                [facility]  Unless otherwise directed by your provider, please contact [facility], our nurse care line that is available for 24/7 assistance.     [phone] (toll-free)    Registered nurses in the [facility] provide clinical advisement, health education, appointment booking, and other advisory services.                     ** Verify the list of medication(s) below is accurate and up to date. Carry this with you in case of emergency. If your medications have changed, please notify your healthcare provider.             Medication List      TAKE these medications        Additional Info                      butalbital-aspirin-caffeine -40 mg Tab   Commonly known as:  BUTALBITAL COMPOUND   Quantity:  30 tablet   Refills:  1   Dose:  1 tablet    Instructions:  Take 1 tablet by mouth 2 (two) times daily as needed.     Begin Date    AM    Noon    PM    Bedtime       levothyroxine 175 MCG tablet   Commonly known as:  SYNTHROID, LEVOTHROID   Quantity:  30 tablet   Refills:  6   Dose:  175 mcg    Instructions:  Take 1 tablet (175 mcg total) by mouth before breakfast.     Begin Date    AM    Noon    PM    Bedtime                  Please bring to all follow up appointments:    1. A copy of your discharge instructions.  2. All medicines you are currently taking in their original bottles.  3. Identification and insurance card.    Please arrive 15 minutes ahead of scheduled appointment time.    Please call 24 hours in advance if you must reschedule your appointment and/or time.        Your Scheduled Appointments     Mar 22, 2017  4:20 PM CDT   Non-Fasting Lab with LABORATORY, [facility]   [facility]    [address] [place] [address]   [phone]            Mar 27, 2017  3:45 PM CDT   Established Patient Visit with MD Coby [facility]son - OB/ GYN ([facility])    [address] [place] [address]   [phone]            Apr 03, 2017  3:15 PM CDT   Established Patient Visit with MD Coby Dobson - [facility]/ G[facility] ([facility])    [address] [place] [address]   [phone]            Apr 05, 2017  3:00 PM CDT   Non-Fasting Lab with LABORATORY, [facility]    [address]  [facility] [address]    [phone]              Your Future Surgeries/Procedures     Mar 29, 2017   Surgery with Samia Hackett MD   [facility]    [address] [facility] [address] [phone]                  Discharge Instructions       To confirm, Your doctor has instructed you that surgery is scheduled for 3/29/17 at  1:00pm.       Please report to [facility], 1st floor, main lobby by 11:30am Pre admit nurse will call day prior to verify final arrival time.      INSTRUCTIONS IMPORTANT!!!   Do not eat, drink, or smoke after 12 midnight. May have water or clear liquid juice until 3 hours prior to surgery. OK to brush teeth, no gum, candy or mints!    ¨ Take only these medicines with a small swallow of water-morning of surgery.  Levothyroxine    Pre operative instructions:  Please review the Pre-Operative Instruction booklet that you were given.        Bathing Instructions--See page 6 in the Pre-operative booklet.      Prevention of surgical site infections:     -Keep incisions clean and dry.   -Do not soak/submerge incisions in water until completely healed.   -Do not apply lotions, powders, creams, or deodorants to site.   -Always make sure hands are cleaned with antibacterial soap/ alcohol-based                 prior to touching the surgical site.  (This includes doctors,                 nurses, staff, and yourself.)    Signs and symptoms:   -Redness and pain around the area where you had surgery   -Drainage of cloudy fluid from your surgical wound   -Fever over 100.4       I have read or had read and explained to me, and understand the above information.  Additional comments or instructions:  Received a copy of Pre-operative instructions booklet, FAQ surgical site infection sheet, and packets of hibiclens (if indicated).        Discharge References/Attachments     PELVIC LAPAROSCOPY, REASONS FOR (ENGLISH)    CYSTECTOMY, OVARIAN,  "UNDERSTANDING (ENGLISH)    OOPHORECTOMY, DISCHARGE INSTRUCTIONS (ENGLISH)    UNILATERAL SALPINGO-OOPHORECTOMY, UNDERSTANDING (ENGLISH)    ANESTHESIA: GENERAL ANESTHESIA (ENGLISH)    POSTSURGICAL DEEP BREATHING, DISCHARGE INSTRUCTIONS (ENGLISH)        Admission Information     Date & Time Provider Department CSN    3/22/2017  3:30 PM Samia Hackett MD [facility]  [id number]      Care Providers     Provider Role Specialty Primary office phone    Samia Hackett MD Attending Provider Gynecology [phone]      Your Vitals Were     Height Weight BMI          5' 5.5" (1.664 m) 89.8 kg (198 lb) 32.45 kg/m2        Recent Lab Values        2/3/2017                           8:21 AM           A1C 5.2           Comment for A1C at  8:21 AM on 2/3/2017:  According to ADA guidelines, hemoglobin A1C <7.0% represents  optimal control in non-pregnant diabetic patients.  Different  metrics may apply to specific populations.   Standards of Medical Care in Diabetes - 2016.  For the purpose of screening for the presence of diabetes:  <5.7%     Consistent with the absence of diabetes  5.7-6.4%  Consistent with increasing risk for diabetes   (prediabetes)  >or=6.5%  Consistent with diabetes  Currently no consensus exists for use of hemoglobin A1C  for diagnosis of diabetes for children.        Allergies as of 3/22/2017     No Known Allergies      Advance Directives     An advance directive is a document which, in the event you are no longer able to make decisions for yourself, tells your healthcare team what kind of treatment you do or do not want to receive, or who you would like to make those decisions for you.  If you do not currently have an advance directive, [facility] encourages you to create one.  For more information call:  [phone]), [phone] ([phone]),  or log on to [email or web address].        Language Assistance Services     ATTENTION: Language assistance services are " available, free of charge. Please call [phone].      ATENCIÓN: Si habla español, tiene a guevara disposición servicios gratuitos de asistencia lingüística. Llame al [phone].     CHÚ Ý: N?u b?n nói Ti?ng Vi?t, có các d?ch v? h? tr? ngôn ng? mi?n phí dành cho b?n. G?i s? [phone].         [facility] complies with applicable Federal civil rights laws and does not discriminate on the basis of race, color, national origin, age, disability, or sex.

## 2017-03-27 ENCOUNTER — OFFICE VISIT (OUTPATIENT)
Dept: OBSTETRICS AND GYNECOLOGY | Facility: CLINIC | Age: 47
End: 2017-03-27
Payer: COMMERCIAL

## 2017-03-27 VITALS
WEIGHT: 202.19 LBS | BODY MASS INDEX: 32.49 KG/M2 | SYSTOLIC BLOOD PRESSURE: 114 MMHG | HEIGHT: 66 IN | DIASTOLIC BLOOD PRESSURE: 72 MMHG

## 2017-03-27 DIAGNOSIS — R10.32 LLQ PAIN: ICD-10-CM

## 2017-03-27 DIAGNOSIS — R10.2 PELVIC PAIN IN FEMALE: ICD-10-CM

## 2017-03-27 DIAGNOSIS — N94.6 DYSMENORRHEA: ICD-10-CM

## 2017-03-27 DIAGNOSIS — N80.03 ADENOMYOSIS: ICD-10-CM

## 2017-03-27 PROCEDURE — 99213 OFFICE O/P EST LOW 20 MIN: CPT | Mod: S$GLB,,, | Performed by: OBSTETRICS & GYNECOLOGY

## 2017-03-27 PROCEDURE — 3078F DIAST BP <80 MM HG: CPT | Mod: S$GLB,,, | Performed by: OBSTETRICS & GYNECOLOGY

## 2017-03-27 PROCEDURE — 3074F SYST BP LT 130 MM HG: CPT | Mod: S$GLB,,, | Performed by: OBSTETRICS & GYNECOLOGY

## 2017-03-27 PROCEDURE — 99999 PR PBB SHADOW E&M-EST. PATIENT-LVL II: CPT | Mod: PBBFAC,,, | Performed by: OBSTETRICS & GYNECOLOGY

## 2017-03-27 PROCEDURE — 1160F RVW MEDS BY RX/DR IN RCRD: CPT | Mod: S$GLB,,, | Performed by: OBSTETRICS & GYNECOLOGY

## 2017-03-27 RX ORDER — OXYCODONE AND ACETAMINOPHEN 5; 325 MG/1; MG/1
1 TABLET ORAL EVERY 6 HOURS PRN
Qty: 40 TABLET | Refills: 0 | Status: SHIPPED | OUTPATIENT
Start: 2017-03-27 | End: 2018-03-27

## 2017-03-27 NOTE — PROGRESS NOTES
Findings: The uterus is retroverted and measured 11.1 x 4 x 4.8 cm and appeared to be normal.  The endometrium was 9.8 mm in thickness.  The right ovary measured 2.6 x 2 x 1.8 cm and contains a simple cyst measuring 2.4 x 1.3 x 1.6 cm.  The left ovary measured 4 x 2.5 x 2.4 cm and contains a complex cyst measuring 2.3 x 1.9 x 2.2 cm.  There was no free fluid or adnexal mass identified.   Impression    Bilateral ovarian cysts with a complex cyst on the left ovary. Followup as clinically warranted..           .  46 y.o. Bing Mclaughlin   For preoperative appointment for her ov cystectomy. Had decided however she would prefer LSO with a hysterectomy. And a right ov cystectomy, with consideration for BSO if both ovaries are grossly affected by lesions such as endometriosis. Aware of menopause symptoms and risks. and HRT risks     Chief Complaint   Patient presents with    Pre-op Exam       Patient Active Problem List    Diagnosis Date Noted    Migraines     Acquired hypothyroidism        Past Medical History:   Diagnosis Date    Hypothyroid     Migraines        Past Surgical History:   Procedure Laterality Date    TUBAL LIGATION      WRIST SURGERY      right, cyst removed.       Family History   Problem Relation Age of Onset    Uterine cancer Mother     Cancer Mother     Cancer Sister     Deep vein thrombosis Neg Hx      labor Neg Hx     Breast cancer Neg Hx        Social History     Social History    Marital status:      Spouse name: N/A    Number of children: N/A    Years of education: N/A     Social History Main Topics    Smoking status: Never Smoker    Smokeless tobacco: Never Used    Alcohol use 0.0 oz/week     0 Standard drinks or equivalent per week      Comment: occ    Drug use: No    Sexual activity: Yes     Partners: Male     Birth control/ protection: Surgical      Comment: tubal     Other Topics Concern    None     Social History Narrative       Current Outpatient  Prescriptions   Medication Sig Dispense Refill    butalbital-aspirin-caffeine (BUTALBITAL COMPOUND) -40 mg Tab Take 1 tablet by mouth 2 (two) times daily as needed. 30 tablet 1    levothyroxine (SYNTHROID, LEVOTHROID) 175 MCG tablet Take 1 tablet (175 mcg total) by mouth before breakfast. 30 tablet 6     No current facility-administered medications for this visit.        Review of patient's allergies indicates:  No Known Allergies    ROS:  GENERAL: No fever, chills, fatigability or weight loss.  CHEST: no chest pain, shortness of breath or palpitations.  ABDOMEN: Appetite fine. No weight loss. Denies diarrhea,  hematemesis or blood in stool.  URINARY: No flank pain, dysuria or hematuria.  BREASTS: Breasts symmetric, nontender and no lumps detected.       PHYSICAL EXAM    Vitals:    03/27/17 1600   BP: 114/72       APPEARANCE: Well nourished, well developed, in no acute distress.  CHEST: CTAB    CVS: RRR    EXTREMITIES: no mohan's, calf tenderness  ABDOMEN: Soft. No tenderness or masses.           ASSESSMENT   Clinical endometriosis and adenomyosis   Recurring L pelvic pain and ovarian cysts    PLAN  RALH LSO and right ov cystectomy    COUNSELING  Discussed with patient the risks of surgery, including but not limited to, risks of anesthesia, infection, bleeding, injury to other organs, such as skin, nerves, arteries, veins, bowel, bladder, ureters, with possible need for reparative or subsequent surgery such as bowel resection/repair, hernia, colostomy, bladder/ureter surgery, blood transfusion, possible hysterectomy   . HRT with its inherent risks ie MI, PE, DVT, CVA, BRCA was discussed w/ risk of BSO . There is also risks of development of deep venous thrombosis, pulmonary embolus, myocardial infarction, possible death. The patient verbalizes understanding. Discussed with the Patient the risks/benefits/alternatives of the treatment options.  Consents were signed. Pamphlets given.

## 2017-03-28 ENCOUNTER — TELEPHONE (OUTPATIENT)
Dept: OBSTETRICS AND GYNECOLOGY | Facility: CLINIC | Age: 47
End: 2017-03-28

## 2017-03-28 ENCOUNTER — ANESTHESIA EVENT (OUTPATIENT)
Dept: SURGERY | Facility: HOSPITAL | Age: 47
End: 2017-03-28
Payer: COMMERCIAL

## 2017-03-28 PROBLEM — N80.03 ADENOMYOSIS: Status: ACTIVE | Noted: 2017-03-28

## 2017-03-28 PROBLEM — R10.2 PELVIC PAIN IN FEMALE: Status: ACTIVE | Noted: 2017-03-28

## 2017-03-28 PROBLEM — R10.32 LLQ PAIN: Status: ACTIVE | Noted: 2017-03-28

## 2017-03-28 PROBLEM — N94.6 DYSMENORRHEA: Status: ACTIVE | Noted: 2017-03-28

## 2017-03-28 NOTE — TELEPHONE ENCOUNTER
Spoke with patient regarding her FMLA and short term disability paperwork. Patient has an additional form and wanted to know a fax number to send it to. Fax number [phone] given to patient. Patient verbalized understanding.

## 2017-03-28 NOTE — TELEPHONE ENCOUNTER
----- Message from Bart Morillo sent at 3/28/2017 10:44 AM CDT -----  Contact: pt  She's calling in regards to her FMLA & short term disability forms, please advise, [phone] (home)

## 2017-03-29 ENCOUNTER — ANESTHESIA (OUTPATIENT)
Dept: SURGERY | Facility: HOSPITAL | Age: 47
End: 2017-03-29
Payer: COMMERCIAL

## 2017-03-29 ENCOUNTER — SURGERY (OUTPATIENT)
Age: 47
End: 2017-03-29

## 2017-03-29 ENCOUNTER — HOSPITAL ENCOUNTER (OUTPATIENT)
Facility: HOSPITAL | Age: 47
Discharge: HOME | End: 2017-03-29
Attending: OBSTETRICS & GYNECOLOGY | Admitting: OBSTETRICS & GYNECOLOGY
Payer: COMMERCIAL

## 2017-03-29 VITALS
RESPIRATION RATE: 18 BRPM | BODY MASS INDEX: 31.53 KG/M2 | HEART RATE: 73 BPM | HEIGHT: 66 IN | SYSTOLIC BLOOD PRESSURE: 105 MMHG | WEIGHT: 196.19 LBS | TEMPERATURE: 98 F | DIASTOLIC BLOOD PRESSURE: 58 MMHG | OXYGEN SATURATION: 99 %

## 2017-03-29 DIAGNOSIS — Z90.710 S/P HYSTERECTOMY: ICD-10-CM

## 2017-03-29 DIAGNOSIS — Z90.710 STATUS POST LAPAROSCOPIC HYSTERECTOMY: Primary | ICD-10-CM

## 2017-03-29 DIAGNOSIS — N80.9 ENDOMETRIOSIS: ICD-10-CM

## 2017-03-29 LAB
B-HCG UR QL: NEGATIVE
CTP QC/QA: YES

## 2017-03-29 PROCEDURE — 63600175 PHARM REV CODE 636 W HCPCS: Performed by: NURSE ANESTHETIST, CERTIFIED REGISTERED

## 2017-03-29 PROCEDURE — 36000713 HC OR TIME LEV V EA ADD 15 MIN: Performed by: OBSTETRICS & GYNECOLOGY

## 2017-03-29 PROCEDURE — 71000033 HC RECOVERY, INTIAL HOUR: Performed by: OBSTETRICS & GYNECOLOGY

## 2017-03-29 PROCEDURE — 25000003 PHARM REV CODE 250: Performed by: ANESTHESIOLOGY

## 2017-03-29 PROCEDURE — 25000003 PHARM REV CODE 250: Performed by: OBSTETRICS & GYNECOLOGY

## 2017-03-29 PROCEDURE — 63600175 PHARM REV CODE 636 W HCPCS: Performed by: ANESTHESIOLOGY

## 2017-03-29 PROCEDURE — 58571 TLH W/T/O 250 G OR LESS: CPT | Mod: ,,, | Performed by: OBSTETRICS & GYNECOLOGY

## 2017-03-29 PROCEDURE — 88307 TISSUE EXAM BY PATHOLOGIST: CPT | Performed by: PATHOLOGY

## 2017-03-29 PROCEDURE — 37000009 HC ANESTHESIA EA ADD 15 MINS: Performed by: OBSTETRICS & GYNECOLOGY

## 2017-03-29 PROCEDURE — 88307 TISSUE EXAM BY PATHOLOGIST: CPT | Mod: 26,,, | Performed by: PATHOLOGY

## 2017-03-29 PROCEDURE — C2628 CATHETER, OCCLUSION: HCPCS | Performed by: OBSTETRICS & GYNECOLOGY

## 2017-03-29 PROCEDURE — 27201423 OPTIME MED/SURG SUP & DEVICES STERILE SUPPLY: Performed by: OBSTETRICS & GYNECOLOGY

## 2017-03-29 PROCEDURE — 36000712 HC OR TIME LEV V 1ST 15 MIN: Performed by: OBSTETRICS & GYNECOLOGY

## 2017-03-29 PROCEDURE — 71000039 HC RECOVERY, EACH ADD'L HOUR: Performed by: OBSTETRICS & GYNECOLOGY

## 2017-03-29 PROCEDURE — 63600175 PHARM REV CODE 636 W HCPCS: Performed by: OBSTETRICS & GYNECOLOGY

## 2017-03-29 PROCEDURE — 81025 URINE PREGNANCY TEST: CPT | Performed by: OBSTETRICS & GYNECOLOGY

## 2017-03-29 PROCEDURE — 25000003 PHARM REV CODE 250: Performed by: NURSE ANESTHETIST, CERTIFIED REGISTERED

## 2017-03-29 PROCEDURE — 37000008 HC ANESTHESIA 1ST 15 MINUTES: Performed by: OBSTETRICS & GYNECOLOGY

## 2017-03-29 RX ORDER — SUCCINYLCHOLINE CHLORIDE 20 MG/ML
INJECTION INTRAMUSCULAR; INTRAVENOUS
Status: DISCONTINUED | OUTPATIENT
Start: 2017-03-29 | End: 2017-03-29

## 2017-03-29 RX ORDER — MIDAZOLAM HYDROCHLORIDE 1 MG/ML
INJECTION, SOLUTION INTRAMUSCULAR; INTRAVENOUS
Status: DISCONTINUED | OUTPATIENT
Start: 2017-03-29 | End: 2017-03-29

## 2017-03-29 RX ORDER — SODIUM CHLORIDE 9 MG/ML
INJECTION, SOLUTION INTRAVENOUS CONTINUOUS
Status: DISCONTINUED | OUTPATIENT
Start: 2017-03-29 | End: 2017-03-29 | Stop reason: HOSPADM

## 2017-03-29 RX ORDER — LIDOCAINE HCL/PF 100 MG/5ML
SYRINGE (ML) INTRAVENOUS
Status: DISCONTINUED | OUTPATIENT
Start: 2017-03-29 | End: 2017-03-29

## 2017-03-29 RX ORDER — SODIUM CHLORIDE 9 MG/ML
3 INJECTION, SOLUTION INTRAMUSCULAR; INTRAVENOUS; SUBCUTANEOUS
Status: DISCONTINUED | OUTPATIENT
Start: 2017-03-29 | End: 2017-03-29

## 2017-03-29 RX ORDER — PROPOFOL 10 MG/ML
VIAL (ML) INTRAVENOUS
Status: DISCONTINUED | OUTPATIENT
Start: 2017-03-29 | End: 2017-03-29

## 2017-03-29 RX ORDER — ONDANSETRON 8 MG/1
8 TABLET, ORALLY DISINTEGRATING ORAL EVERY 8 HOURS PRN
Status: DISCONTINUED | OUTPATIENT
Start: 2017-03-29 | End: 2017-03-29

## 2017-03-29 RX ORDER — KETOROLAC TROMETHAMINE 30 MG/ML
30 INJECTION, SOLUTION INTRAMUSCULAR; INTRAVENOUS ONCE
Status: COMPLETED | OUTPATIENT
Start: 2017-03-29 | End: 2017-03-29

## 2017-03-29 RX ORDER — DIPHENHYDRAMINE HCL 25 MG
25 CAPSULE ORAL EVERY 4 HOURS PRN
Status: DISCONTINUED | OUTPATIENT
Start: 2017-03-29 | End: 2017-03-29 | Stop reason: HOSPADM

## 2017-03-29 RX ORDER — CEFAZOLIN SODIUM 2 G/50ML
2 SOLUTION INTRAVENOUS
Status: COMPLETED | OUTPATIENT
Start: 2017-03-29 | End: 2017-03-29

## 2017-03-29 RX ORDER — MORPHINE SULFATE 10 MG/ML
2 INJECTION INTRAMUSCULAR; INTRAVENOUS; SUBCUTANEOUS EVERY 5 MIN PRN
Status: COMPLETED | OUTPATIENT
Start: 2017-03-29 | End: 2017-03-29

## 2017-03-29 RX ORDER — LIDOCAINE HYDROCHLORIDE 10 MG/ML
1 INJECTION, SOLUTION EPIDURAL; INFILTRATION; INTRACAUDAL; PERINEURAL ONCE
Status: DISCONTINUED | OUTPATIENT
Start: 2017-03-29 | End: 2017-03-29

## 2017-03-29 RX ORDER — FENTANYL CITRATE 50 UG/ML
INJECTION, SOLUTION INTRAMUSCULAR; INTRAVENOUS
Status: DISCONTINUED | OUTPATIENT
Start: 2017-03-29 | End: 2017-03-29

## 2017-03-29 RX ORDER — ONDANSETRON 2 MG/ML
INJECTION INTRAMUSCULAR; INTRAVENOUS
Status: DISCONTINUED | OUTPATIENT
Start: 2017-03-29 | End: 2017-03-29

## 2017-03-29 RX ORDER — FAMOTIDINE 20 MG/50ML
20 INJECTION, SOLUTION INTRAVENOUS ONCE
Status: COMPLETED | OUTPATIENT
Start: 2017-03-29 | End: 2017-03-29

## 2017-03-29 RX ORDER — SIMETHICONE 80 MG
80 TABLET,CHEWABLE ORAL EVERY 4 HOURS PRN
Status: DISCONTINUED | OUTPATIENT
Start: 2017-03-29 | End: 2017-03-29 | Stop reason: HOSPADM

## 2017-03-29 RX ORDER — ROCURONIUM BROMIDE 10 MG/ML
INJECTION, SOLUTION INTRAVENOUS
Status: DISCONTINUED | OUTPATIENT
Start: 2017-03-29 | End: 2017-03-29

## 2017-03-29 RX ORDER — SODIUM CHLORIDE, SODIUM LACTATE, POTASSIUM CHLORIDE, CALCIUM CHLORIDE 600; 310; 30; 20 MG/100ML; MG/100ML; MG/100ML; MG/100ML
INJECTION, SOLUTION INTRAVENOUS CONTINUOUS
Status: DISCONTINUED | OUTPATIENT
Start: 2017-03-29 | End: 2017-03-29

## 2017-03-29 RX ORDER — OXYCODONE HYDROCHLORIDE 5 MG/1
5 TABLET ORAL
Status: DISCONTINUED | OUTPATIENT
Start: 2017-03-29 | End: 2017-03-29 | Stop reason: HOSPADM

## 2017-03-29 RX ORDER — KETOROLAC TROMETHAMINE 30 MG/ML
15 INJECTION, SOLUTION INTRAMUSCULAR; INTRAVENOUS ONCE
Status: DISCONTINUED | OUTPATIENT
Start: 2017-03-29 | End: 2017-03-29

## 2017-03-29 RX ORDER — MEPERIDINE HYDROCHLORIDE 50 MG/ML
12.5 INJECTION INTRAMUSCULAR; INTRAVENOUS; SUBCUTANEOUS ONCE AS NEEDED
Status: COMPLETED | OUTPATIENT
Start: 2017-03-29 | End: 2017-03-29

## 2017-03-29 RX ORDER — GLYCOPYRROLATE 0.2 MG/ML
INJECTION INTRAMUSCULAR; INTRAVENOUS
Status: DISCONTINUED | OUTPATIENT
Start: 2017-03-29 | End: 2017-03-29

## 2017-03-29 RX ORDER — DEXAMETHASONE SODIUM PHOSPHATE 4 MG/ML
INJECTION, SOLUTION INTRA-ARTICULAR; INTRALESIONAL; INTRAMUSCULAR; INTRAVENOUS; SOFT TISSUE
Status: DISCONTINUED | OUTPATIENT
Start: 2017-03-29 | End: 2017-03-29

## 2017-03-29 RX ORDER — NEOSTIGMINE METHYLSULFATE 1 MG/ML
INJECTION, SOLUTION INTRAVENOUS
Status: DISCONTINUED | OUTPATIENT
Start: 2017-03-29 | End: 2017-03-29

## 2017-03-29 RX ORDER — ACETAMINOPHEN 10 MG/ML
1000 INJECTION, SOLUTION INTRAVENOUS ONCE
Status: COMPLETED | OUTPATIENT
Start: 2017-03-29 | End: 2017-03-29

## 2017-03-29 RX ADMIN — ONDANSETRON 4 MG: 2 INJECTION, SOLUTION INTRAMUSCULAR; INTRAVENOUS at 02:03

## 2017-03-29 RX ADMIN — ROCURONIUM BROMIDE 10 MG: 10 INJECTION, SOLUTION INTRAVENOUS at 01:03

## 2017-03-29 RX ADMIN — MORPHINE SULFATE 2 MG: 10 INJECTION, SOLUTION INTRAMUSCULAR; INTRAVENOUS at 03:03

## 2017-03-29 RX ADMIN — SODIUM CHLORIDE, SODIUM LACTATE, POTASSIUM CHLORIDE, AND CALCIUM CHLORIDE: 600; 310; 30; 20 INJECTION, SOLUTION INTRAVENOUS at 12:03

## 2017-03-29 RX ADMIN — SODIUM CHLORIDE, SODIUM LACTATE, POTASSIUM CHLORIDE, AND CALCIUM CHLORIDE: 600; 310; 30; 20 INJECTION, SOLUTION INTRAVENOUS at 02:03

## 2017-03-29 RX ADMIN — FAMOTIDINE 20 MG: 20 INJECTION, SOLUTION INTRAVENOUS at 04:03

## 2017-03-29 RX ADMIN — OXYCODONE HYDROCHLORIDE 5 MG: 5 TABLET ORAL at 03:03

## 2017-03-29 RX ADMIN — MIDAZOLAM HYDROCHLORIDE 2 MG: 1 INJECTION, SOLUTION INTRAMUSCULAR; INTRAVENOUS at 12:03

## 2017-03-29 RX ADMIN — ROCURONIUM BROMIDE 35 MG: 10 INJECTION, SOLUTION INTRAVENOUS at 01:03

## 2017-03-29 RX ADMIN — ROCURONIUM BROMIDE 5 MG: 10 INJECTION, SOLUTION INTRAVENOUS at 01:03

## 2017-03-29 RX ADMIN — ACETAMINOPHEN 1000 MG: 10 INJECTION, SOLUTION INTRAVENOUS at 03:03

## 2017-03-29 RX ADMIN — GLYCOPYRROLATE 0.6 MG: 0.2 INJECTION, SOLUTION INTRAMUSCULAR; INTRAVENOUS at 02:03

## 2017-03-29 RX ADMIN — CEFAZOLIN SODIUM 2 G: 2 SOLUTION INTRAVENOUS at 01:03

## 2017-03-29 RX ADMIN — KETOROLAC TROMETHAMINE 30 MG: 30 INJECTION, SOLUTION INTRAMUSCULAR at 04:03

## 2017-03-29 RX ADMIN — PROPOFOL 160 MG: 10 INJECTION, EMULSION INTRAVENOUS at 01:03

## 2017-03-29 RX ADMIN — SUCCINYLCHOLINE CHLORIDE 100 MG: 20 INJECTION, SOLUTION INTRAMUSCULAR; INTRAVENOUS at 01:03

## 2017-03-29 RX ADMIN — NEOSTIGMINE METHYLSULFATE 4 MG: 1 INJECTION INTRAVENOUS at 02:03

## 2017-03-29 RX ADMIN — FENTANYL CITRATE 50 MCG: 50 INJECTION, SOLUTION INTRAMUSCULAR; INTRAVENOUS at 02:03

## 2017-03-29 RX ADMIN — LIDOCAINE HYDROCHLORIDE 60 MG: 20 INJECTION, SOLUTION INTRAVENOUS at 01:03

## 2017-03-29 RX ADMIN — FENTANYL CITRATE 50 MCG: 50 INJECTION, SOLUTION INTRAMUSCULAR; INTRAVENOUS at 01:03

## 2017-03-29 RX ADMIN — SODIUM CHLORIDE: 0.9 INJECTION, SOLUTION INTRAVENOUS at 04:03

## 2017-03-29 RX ADMIN — DEXAMETHASONE SODIUM PHOSPHATE 4 MG: 4 INJECTION, SOLUTION INTRA-ARTICULAR; INTRALESIONAL; INTRAMUSCULAR; INTRAVENOUS; SOFT TISSUE at 01:03

## 2017-03-29 RX ADMIN — MEPERIDINE HYDROCHLORIDE 12.5 MG: 50 INJECTION INTRAMUSCULAR; INTRAVENOUS; SUBCUTANEOUS at 03:03

## 2017-03-29 NOTE — ANESTHESIA PREPROCEDURE EVALUATION
03/29/2017  Bing Mclaughlin is a 46 y.o., female.    [facility] Anesthesia Evaluation    I have reviewed the Patient Summary Reports.    I have reviewed the Nursing Notes.   I have reviewed the Medications.     Review of Systems  Anesthesia Hx:  No problems with previous Anesthesia  History of prior surgery of interest to airway management or planning: Denies Family Hx of Anesthesia complications.   Denies Personal Hx of Anesthesia complications.   Cardiovascular:  Cardiovascular Normal     Pulmonary:  Pulmonary Normal    Neurological:   Headaches    Endocrine:   Hypothyroidism        Physical Exam  General:  Well nourished, Obesity    Airway/Jaw/Neck:  Airway Findings: Mallampati: II     Dental:  DENTAL FINDINGS: Normal   Chest/Lungs:  Chest/Lungs Findings: Normal Respiratory Rate     Heart/Vascular:  Heart Findings: Normal            Anesthesia Plan  Type of Anesthesia, risks & benefits discussed:  Anesthesia Type:  general  Patient's Preference:   Intra-op Monitoring Plan:   Intra-op Monitoring Plan Comments:   Post Op Pain Control Plan:   Post Op Pain Control Plan Comments:   Induction:   IV  Beta Blocker:  Patient is not currently on a Beta-Blocker (No further documentation required).       Informed Consent: Patient understands risks and agrees with Anesthesia plan.  Questions answered. Anesthesia consent signed with patient.  ASA Score: 2     Day of Surgery Review of History & Physical: I have interviewed and examined the patient. I have reviewed the patient's H&P dated:  There are no significant changes.          Ready For Surgery From Anesthesia Perspective.

## 2017-03-29 NOTE — OP NOTE
OPERATIVE NOTE    3/29/2017     PRE-PROCEDURE COUNSELING  Patient counseled on the risks, benefits, and alternatives to procedure.  Please see preoperative consents.                                                    SURGEON:  Samia Hackett M.D.                                                                                                                         ASSISTANT:  Gris Flores, and [place]                                                                                                                          PREOPERATIVE DIAGNOSES:   Dysmenorrhea, pelvic pain, L complex ov cyst, adhesions, suspected endometriosis, simple R ov cyst                                                                                                             POSTOPERATIVE DIAGNOSES:  Same, simple R ov cyst                                                                                                                            PROCEDURE:    Robotic-assisted laparoscopic hysterectomy, left salpingo-oophorectomy, right salpingectomy, adhesiolysis 15min      (right ovary was preserved)    EBL: 20mL                      FINDINGS: boggy uterus, benign appearing pelvic organs, L ovary with cyst adhesed along with tube to the L pelvic gutter and the left posterior uterine surface. 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                                                                                                                                   PROCEDURE IN DETAIL:    After discussion of the risks, benefits and alternatives, the patient understood the potential risks and complications and signed consents were reviewed. Patient was given preoperative sequential compression devices and antibiotics and was taken to the Operating Room where the general endotracheal anesthesia was administered  and found to be adequate.  She was prepped and draped in routine fashion. The LISA manipulator was applied in routine fashion.  Attention was turned to the abdomen where the anterior abdominal wall was grasped with towel clamps and elevated.  Veress needle was introduced through the umbilicus and abdomen was insufflated with CO2 gas to 15 mmHg.  An 8mm incision was made in the umbilicus and an 8mm trocar was placed into the abdomen. Under direct visualization, ancillary ports were placed.  This included 8 mm lateral ports and 5 mm left upper quadrant port.  The patient was noted to be in correct anatomical position, placed in Trendelenburg, and robotic operating system was advanced towards the patient. Robotic arms were attached to trocar ends and the operating instruments were placed into the abdomen under direct visualization from the console.This included PK Gyrus for cautery and EndoShears for cold-cut transection.  A survey of the pelvis was made. All pedicles were surgically transected in a similar fashion: with PK cautery followed by a cold cut transection.      The path of the ureter was visualized. Next, after adhesiolysis to free the left ovary as noted above,  the left infundibulopelvic ligament was cauterized and transected followed by successive pedicles of the posterior broad ligament, round ligament and carried through the anterior broad ligament to create a bladder flap. The rigth uteroovarian ligment was cauterized followed by the mesosalpinx and the right round ligament.  The  uterine arteries were skeletonized on both sides. Anterior followed by posterior colpotomies were made.  Next, the left uterine artery was then cauterized and transected followed by successive pedicles of the cardinal ligament to reach the colpotomy site. The same was performed on the other side and the uterus, cervix, tubes and ovaries were removed through the vagina and sent to pathology.  The vaginal incision was then  reapproximated with 0 Vicryl in a running locked fashion.  Lapra-Tys were applied at the ends of the incision, and an intracorporal knot was tied in the center of the incision.  Irrigation, desufflation and reinsufflation confirmed hemostasis.      The fascia of the abdominal incisions closed under direct visualization with 0-vicryl. The skin was reapproximated with 4-0 vicryl and supported with Dermabond.     Lap, needle and instrument counts were  correct x2.       Patient was sent to the recovery room in stable condition.

## 2017-03-29 NOTE — IP AVS SNAPSHOT
[address] [place] [address]           Patient Discharge Instructions   Our goal is to set you up for success. This packet includes information on your condition, medications, and your home care.  It will help you care for yourself to prevent having to return to the hospital.     Please ask your nurse if you have any questions.      There are many details to remember when preparing to leave the hospital. Here is what you will need to do:    1. Take your medicine. If you are prescribed medications, review your Medication List on the following pages. You may have new medications to  at the pharmacy and others that you'll need to stop taking. Review the instructions for how and when to take your medications. Talk with your doctor or nurses if you are unsure of what to do.     2. Go to your follow-up appointments. Specific follow-up information is listed in the following pages. Your may be contacted by a nurse or clinical provider about future appointments. Be sure we have all of the phone numbers to reach you. Please contact your provider's office if you are unable to make an appointment.     3. Watch for warning signs. Your doctor or nurse will give you detailed warning signs to watch for and when to call for assistance. These instructions may also include educational information about your condition. If you experience any of warning signs to your health, call your doctor.               ** Verify the list of medication(s) below is accurate and up to date. Carry this with you in case of emergency. If your medications have changed, please notify your healthcare provider.             Medication List      CONTINUE taking these medications        Additional Info                      butalbital-aspirin-caffeine -40 mg Tab   Commonly known as:  BUTALBITAL COMPOUND   Quantity:  30 tablet   Refills:  1   Dose:  1 tablet    Instructions:  Take 1 tablet by mouth 2  (two) times daily as needed.     Begin Date    AM    Noon    PM    Bedtime       levothyroxine 175 MCG tablet   Commonly known as:  SYNTHROID, LEVOTHROID   Quantity:  30 tablet   Refills:  6   Dose:  175 mcg    Instructions:  Take 1 tablet (175 mcg total) by mouth before breakfast.     Begin Date    AM    Noon    PM    Bedtime       oxycodone-acetaminophen 5-325 mg per tablet   Commonly known as:  ROXICET   Quantity:  40 tablet   Refills:  0   Dose:  1 tablet    Instructions:  Take 1 tablet by mouth every 6 (six) hours as needed for Pain.     Begin Date    AM    Noon    PM    Bedtime                  Please bring to all follow up appointments:    1. A copy of your discharge instructions.  2. All medicines you are currently taking in their original bottles.  3. Identification and insurance card.    Please arrive 15 minutes ahead of scheduled appointment time.    Please call 24 hours in advance if you must reschedule your appointment and/or time.        Your Scheduled Appointments     Apr 03, 2017  3:15 PM CDT   Established Patient Visit with Samia Hackett MD   [facility]    [address] [place] [id number] [phone]            Apr 05, 2017  3:00 PM CDT   Non-Fasting Lab with [facility]    [address] [facility] [id number] [phone]              Follow-up Information     Follow up with Samia Hackett MD In 4 weeks.    Specialties:  Gynecology, Obstetrics and Gynecology, Obstetrics    Contact information:    [address]  [place] [id number] [phone]          Discharge Instructions     Future Orders    Call MD for:  difficulty breathing or increased cough     Call MD for:  increased confusion or weakness     Call MD for:  persistent dizziness, light-headedness, or visual disturbances     Call MD for:  persistent nausea and vomiting or diarrhea     Call MD for:  redness, tenderness, or signs of  "infection (pain, swelling, redness, odor or green/yellow discharge around incision site)     Call MD for:  severe persistent headache     Call MD for:  severe uncontrolled pain     Call MD for:  temperature >100.4     Call MD for:  worsening rash     Diet general     Questions:    Total calories:      Fat restriction, if any:      Protein restriction, if any:      Na restriction, if any:      Fluid restriction:      Additional restrictions:      No dressing needed     Other restrictions (specify):     Comments:    Light activity x 4 weeks; Pelvic Rest x 8 weeks        Primary Diagnosis     Your primary diagnosis was:  History Of Hysterectomy      Admission Information     Date & Time Provider Department CSN    3/29/2017 10:20 AM Samia Hackett MD [facility] [id number]      Care Providers     Provider Role Specialty Primary office phone    Samia Hackett MD Attending Provider Gynecology [phone]    Samia Hackett MD Surgeon  Gynecology [phone]      Your Vitals Were     BP Pulse Temp Resp Height Weight    111/63 69 97.5 °F (36.4 °C) (Temporal) 15 5' 5.5" (1.664 m) 89 kg (196 lb 3.4 oz)    Last Period SpO2 BMI          02/25/2017 97% 32.15 kg/m2        Recent Lab Values        2/3/2017                           8:21 AM           A1C 5.2           Comment for A1C at  8:21 AM on 2/3/2017:  According to ADA guidelines, hemoglobin A1C <7.0% represents  optimal control in non-pregnant diabetic patients.  Different  metrics may apply to specific populations.   Standards of Medical Care in Diabetes - 2016.  For the purpose of screening for the presence of diabetes:  <5.7%     Consistent with the absence of diabetes  5.7-6.4%  Consistent with increasing risk for diabetes   (prediabetes)  >or=6.5%  Consistent with diabetes  Currently no consensus exists for use of hemoglobin A1C  for diagnosis of diabetes for children.        Pending Labs     Order Current Status    Specimen to Pathology - " Surgery Collected (03/29/17 1423)      Allergies as of 3/29/2017     No Known Allergies      [facility]     [facility] Nurse Care Line - 24/7 Assistance  Unless otherwise directed by your provider, please contact [facility], our nurse care line that is available for 24/7 assistance.     Registered nurses in the [facility] provide clinical advisement, health education, appointment booking, and other advisory services.  Call for this free service at [phone].        Advance Directives     An advance directive is a document which, in the event you are no longer able to make decisions for yourself, tells your healthcare team what kind of treatment you do or do not want to receive, or who you would like to make those decisions for you.  If you do not currently have an advance directive, [facility] encourages you to create one.  For more information call:  [phone]), [phone] ([phone]),  or log on to [email or web address].        Language Assistance Services     ATTENTION: Language assistance services are available, free of charge. Please call [phone].      ATENCIÓN: Si habla español, tiene a guevara disposición servicios gratuitos de asistencia lingüística. Llame al [phone].     CHÚ Ý: N?u b?n nói Ti?ng Vi?t, có các d?ch v? h? tr? ngôn ng? mi?n phí dành cho b?n. G?i s? [phone].         [facility] complies with applicable Federal civil rights laws and does not discriminate on the basis of race, color, national origin, age, disability, or sex.

## 2017-03-29 NOTE — INTERVAL H&P NOTE
The patient has been examined and the H&P has been reviewed:    I concur with the findings and no changes have occurred since H&P was written.   Still wants LSO with the hyst, and a R ov cystectomy.    Anesthesia/Surgery risks, benefits and alternative options discussed and understood by patient/family.          There are no hospital problems to display for this patient.

## 2017-03-29 NOTE — ANESTHESIA RELEASE NOTE
"Anesthesia Release from PACU Note    Patient: Bing Mclaughlin    Procedure(s) Performed: Procedure(s) (LRB):  XI ROBOTIC ASSISTED LAPAROSCOPIC HYSTERECTOMY (N/A)  SALPINGECTOMY-ROBOTIC ASSISTED (Bilateral)  ROBOT ASSISTED LAPAROSCOPIC OOPHERECTOMY (Left)    Anesthesia type: general    Post pain: Adequate analgesia    Post assessment: no apparent anesthetic complications, tolerated procedure well and no evidence of recall    Last Vitals:   Visit Vitals    BP (!) 126/58 (BP Location: Right arm, Patient Position: Sitting, BP Method: Automatic)    Pulse 80    Temp 37.2 °C (98.9 °F) (Oral)    Resp 18    Ht 5' 5.5" (1.664 m)    Wt 89 kg (196 lb 3.4 oz)    LMP 02/25/2017    SpO2 100%    Breastfeeding No    BMI 32.15 kg/m2       Post vital signs: stable    Level of consciousness: responds to stimulation    Nausea/Vomiting: no nausea/no vomiting    Complications: none    Airway Patency: patent    Respiratory: unassisted    Cardiovascular: stable and blood pressure at baseline    Hydration: euvolemic  "

## 2017-03-29 NOTE — TRANSFER OF CARE
"Anesthesia Transfer of Care Note    Patient: Bing Mclaughlin    Procedure(s) Performed: Procedure(s) (LRB):  XI ROBOTIC ASSISTED LAPAROSCOPIC HYSTERECTOMY (N/A)  SALPINGECTOMY-ROBOTIC ASSISTED (Bilateral)  ROBOT ASSISTED LAPAROSCOPIC OOPHERECTOMY (Left)    Patient location: PACU    Anesthesia Type: general    Transport from OR: Transported from OR on room air with adequate spontaneous ventilation    Post pain: adequate analgesia    Post assessment: no apparent anesthetic complications    Post vital signs: stable    Level of consciousness: responds to stimulation    Nausea/Vomiting: no nausea/vomiting    Complications: none          Last vitals:   Visit Vitals    BP (!) 126/58 (BP Location: Right arm, Patient Position: Sitting, BP Method: Automatic)    Pulse 80    Temp 37.2 °C (98.9 °F) (Oral)    Resp 18    Ht 5' 5.5" (1.664 m)    Wt 89 kg (196 lb 3.4 oz)    LMP 02/25/2017    SpO2 100%    Breastfeeding No    BMI 32.15 kg/m2     "

## 2017-03-29 NOTE — PLAN OF CARE
Pt resting on stretcher s/p RALH with bilat salpingectomy and left oophorectomy performed under general anesthesia by Dr. MARISSA Hackett. Respirations even and unlabored on 98% face tent with O2 sats of 100%. Abd sites remain intact with edges approximated. VSS. See flow sheet for detailed assesment. Will cont to monitor.

## 2017-03-29 NOTE — ANESTHESIA POSTPROCEDURE EVALUATION
"Anesthesia Post Evaluation    Patient: Bing Mclaughlin    Procedure(s) Performed: Procedure(s) (LRB):  XI ROBOTIC ASSISTED LAPAROSCOPIC HYSTERECTOMY (N/A)  SALPINGECTOMY-ROBOTIC ASSISTED (Bilateral)  ROBOT ASSISTED LAPAROSCOPIC OOPHERECTOMY (Left)    Final Anesthesia Type: general  Patient location during evaluation: PACU  Patient participation: Yes- Able to Participate  Level of consciousness: awake and alert  Post-procedure vital signs: reviewed and stable  Pain management: adequate  Airway patency: patent  PONV status at discharge: No PONV  Anesthetic complications: no      Cardiovascular status: blood pressure returned to baseline  Respiratory status: unassisted  Hydration status: euvolemic  Follow-up not needed.        Visit Vitals    /61    Pulse 60    Temp 36.9 °C (98.4 °F) (Temporal)    Resp 12    Ht 5' 5.5" (1.664 m)    Wt 89 kg (196 lb 3.4 oz)    LMP 02/25/2017    SpO2 100%    Breastfeeding No    BMI 32.15 kg/m2       Pain/Clayton Score: Pain Assessment Performed: Yes (3/29/2017  3:30 PM)  Presence of Pain: complains of pain/discomfort (3/29/2017  3:30 PM)  Pain Rating Prior to Med Admin: 6 (3/29/2017  3:49 PM)  Clayton Score: 10 (3/29/2017  3:30 PM)      "

## 2017-03-29 NOTE — PLAN OF CARE
Problem: Patient Care Overview  Goal: Plan of Care Review  Outcome: Ongoing (interventions implemented as appropriate)  Plan of care reviewed and ongoing

## 2017-03-30 ENCOUNTER — TELEPHONE (OUTPATIENT)
Dept: OBSTETRICS AND GYNECOLOGY | Facility: CLINIC | Age: 47
End: 2017-03-30

## 2017-03-30 NOTE — PROGRESS NOTES
Postoperative Progress Note    POD # 0   Bing Mclaughlin is a 46 y.o. female patient s/p Robotic-assisted laparoscopic hysterectomy/left salpingo-oophorectomy/right salpingectomy/JAMES(right ovary was preserved)     Patient Active Problem List   Diagnosis    Migraines    Acquired hypothyroidism    Endometriosis of pelvis    Adenomyosis    Dysmenorrhea    Pelvic pain in female    LLQ pain    Endometriosis       Review of patient's allergies indicates:  No Known Allergies    Subjective:   Pt reports no complaints.  Voiding without difficulty and passing flatus.  Denies nausea or vomiting.  Desires to go home.  Diet: Adequate intake.    Activity level: Normal.    Pain control: Well controlled.      Objective:  Vitals:    03/29/17 1622   BP: 111/63   Pulse: 69   Resp: 15   Temp: 97.5 °F (36.4 °C)     Urine Output:  Adequate    General appearance: Comfortable and well-appearing, in no distress  Chest: Clear to auscultation  CV: Regular rate and rhythm   Abdomen: Soft, non-distended, appropriately tender, no rebound, no guarding, normoactive bowel sounds   Wounds:  Clean, dry and intact   : No blood  Extremities: No calf tenderness or mohan's sign       Assessment:   s/p Robotic-assisted laparoscopic hysterectomy/left salpingo-oophorectomy/right salpingectomy/JAMES(right ovary was preserved) POD # 0  - Doing well; desires to go home    Plan:    Pt doing well.  Stable for discharge home.  Pt counseled on warning signs and post-operative care instructions.

## 2017-03-30 NOTE — DISCHARGE SUMMARY
Discharge Note  Short Stay      SUMMARY     Admit Date: 3/29/2017    Attending Physician: Samia Hackett MD     Discharge Physician: Samia Hackett MD    Discharge Date: 3/29/2017 7:15 PM    Final Diagnosis:   1. Status post laparoscopic hysterectomy    2. Endometriosis of pelvis    3. Endometriosis    4. S/P hysterectomy        Disposition: Home or Self Care    Condition:  Stable    Hospital Course:  Pt was admitted for scheduled hysterectomy.  Robotic-assisted laparoscopic hysterectomy/left salpingo-oophorectomy/right salpingectomy/JAMES(right ovary was preserved) was performed without complications.  Post-operative course was unremarkable and pt recovered well.  Pt was discharged upon requests and upon meeting all discharge criteria.  Pt was counseled on post-operative care and warning sign instructions prior to her discharge.    Patient Instructions:   Current Discharge Medication List      CONTINUE these medications which have NOT CHANGED    Details   levothyroxine (SYNTHROID, LEVOTHROID) 175 MCG tablet Take 1 tablet (175 mcg total) by mouth before breakfast.  Qty: 30 tablet, Refills: 6    Associated Diagnoses: Acquired hypothyroidism      butalbital-aspirin-caffeine (BUTALBITAL COMPOUND) -40 mg Tab Take 1 tablet by mouth 2 (two) times daily as needed.  Qty: 30 tablet, Refills: 1      oxycodone-acetaminophen (ROXICET) 5-325 mg per tablet Take 1 tablet by mouth every 6 (six) hours as needed for Pain.  Qty: 40 tablet, Refills: 0             Discharge Procedure Orders (must include Diet, Follow-up, Activity)    Discharge Procedure Orders (must include Diet, Follow-up, Activity)  Diet general     Other restrictions (specify):   Order Comments: Light activity x 4 weeks; Pelvic Rest x 8 weeks     Call MD for:  temperature >100.4     Call MD for:  persistent nausea and vomiting or diarrhea     Call MD for:  severe uncontrolled pain     Call MD for:  redness, tenderness, or signs of infection (pain,  swelling, redness, odor or green/yellow discharge around incision site)     Call MD for:  difficulty breathing or increased cough     Call MD for:  severe persistent headache     Call MD for:  worsening rash     Call MD for:  persistent dizziness, light-headedness, or visual disturbances     Call MD for:  increased confusion or weakness     No dressing needed

## 2017-03-30 NOTE — TELEPHONE ENCOUNTER
Spoke to with  she wants to know if another medication can be taken OTC. Advise pt  per Dr Hackett she can take Motrin.

## 2017-03-31 ENCOUNTER — TELEPHONE (OUTPATIENT)
Dept: OBSTETRICS AND GYNECOLOGY | Facility: CLINIC | Age: 47
End: 2017-03-31

## 2017-03-31 NOTE — TELEPHONE ENCOUNTER
----- Message from Bee Dean sent at 3/31/2017  1:19 PM CDT -----  Contact: Pt  - genna   States he is calling rg pt having an appt on Monday and just had surgery and is calling to see if she needs to come in for the appt/ states she was told to come back in 4 weeks and can be reached at [phone]//thanks/dbw

## 2017-04-03 ENCOUNTER — TELEPHONE (OUTPATIENT)
Dept: OBSTETRICS AND GYNECOLOGY | Facility: CLINIC | Age: 47
End: 2017-04-03

## 2017-04-03 ENCOUNTER — PATIENT MESSAGE (OUTPATIENT)
Dept: OBSTETRICS AND GYNECOLOGY | Facility: CLINIC | Age: 47
End: 2017-04-03

## 2017-04-03 NOTE — TELEPHONE ENCOUNTER
FMLA paperwork completed, scanned to chart.  Notified patient and patient requested papers be emailed to [email or web address]

## 2017-04-05 ENCOUNTER — HOSPITAL ENCOUNTER (EMERGENCY)
Facility: HOSPITAL | Age: 47
Discharge: HOME | End: 2017-04-05
Attending: EMERGENCY MEDICINE
Payer: COMMERCIAL

## 2017-04-05 ENCOUNTER — TELEPHONE (OUTPATIENT)
Dept: OBSTETRICS AND GYNECOLOGY | Facility: CLINIC | Age: 47
End: 2017-04-05

## 2017-04-05 VITALS
RESPIRATION RATE: 16 BRPM | HEIGHT: 65 IN | DIASTOLIC BLOOD PRESSURE: 71 MMHG | SYSTOLIC BLOOD PRESSURE: 130 MMHG | TEMPERATURE: 99 F | WEIGHT: 180 LBS | HEART RATE: 88 BPM | BODY MASS INDEX: 29.99 KG/M2 | OXYGEN SATURATION: 95 %

## 2017-04-05 DIAGNOSIS — R50.9 FEVER, UNSPECIFIED FEVER CAUSE: Primary | ICD-10-CM

## 2017-04-05 DIAGNOSIS — R05.9 COUGH: ICD-10-CM

## 2017-04-05 DIAGNOSIS — R82.90 ABNORMAL URINE: ICD-10-CM

## 2017-04-05 DIAGNOSIS — Z98.890 POST-OPERATIVE STATE: ICD-10-CM

## 2017-04-05 LAB
ALBUMIN SERPL BCP-MCNC: 3.8 G/DL
ALP SERPL-CCNC: 78 U/L
ALT SERPL W/O P-5'-P-CCNC: 12 U/L
ANION GAP SERPL CALC-SCNC: 9 MMOL/L
AST SERPL-CCNC: 14 U/L
BACTERIA #/AREA URNS HPF: ABNORMAL /HPF
BASOPHILS # BLD AUTO: 0.04 K/UL
BASOPHILS NFR BLD: 0.5 %
BILIRUB SERPL-MCNC: 0.6 MG/DL
BILIRUB UR QL STRIP: NEGATIVE
BUN SERPL-MCNC: 12 MG/DL
CALCIUM SERPL-MCNC: 9.5 MG/DL
CHLORIDE SERPL-SCNC: 104 MMOL/L
CLARITY UR: CLEAR
CO2 SERPL-SCNC: 24 MMOL/L
COLOR UR: YELLOW
CREAT SERPL-MCNC: 0.8 MG/DL
DIFFERENTIAL METHOD: ABNORMAL
EOSINOPHIL # BLD AUTO: 0.1 K/UL
EOSINOPHIL NFR BLD: 0.9 %
ERYTHROCYTE [DISTWIDTH] IN BLOOD BY AUTOMATED COUNT: 12.3 %
EST. GFR  (AFRICAN AMERICAN): >60 ML/MIN/1.73 M^2
EST. GFR  (NON AFRICAN AMERICAN): >60 ML/MIN/1.73 M^2
GLUCOSE SERPL-MCNC: 94 MG/DL
GLUCOSE UR QL STRIP: NEGATIVE
HCT VFR BLD AUTO: 39.2 %
HGB BLD-MCNC: 13.5 G/DL
HGB UR QL STRIP: ABNORMAL
KETONES UR QL STRIP: NEGATIVE
LACTATE SERPL-SCNC: 1.2 MMOL/L
LEUKOCYTE ESTERASE UR QL STRIP: NEGATIVE
LYMPHOCYTES # BLD AUTO: 1 K/UL
LYMPHOCYTES NFR BLD: 11.1 %
MCH RBC QN AUTO: 30.3 PG
MCHC RBC AUTO-ENTMCNC: 34.4 %
MCV RBC AUTO: 88 FL
MICROSCOPIC COMMENT: ABNORMAL
MONOCYTES # BLD AUTO: 0.8 K/UL
MONOCYTES NFR BLD: 8.9 %
NEUTROPHILS # BLD AUTO: 7 K/UL
NEUTROPHILS NFR BLD: 78.6 %
NITRITE UR QL STRIP: NEGATIVE
PH UR STRIP: 6 [PH] (ref 5–8)
PLATELET # BLD AUTO: 263 K/UL
PMV BLD AUTO: 11 FL
POTASSIUM SERPL-SCNC: 4.8 MMOL/L
PROT SERPL-MCNC: 7.4 G/DL
PROT UR QL STRIP: NEGATIVE
RBC # BLD AUTO: 4.45 M/UL
RBC #/AREA URNS HPF: 3 /HPF (ref 0–4)
SODIUM SERPL-SCNC: 137 MMOL/L
SP GR UR STRIP: 1.01 (ref 1–1.03)
SQUAMOUS #/AREA URNS HPF: 2 /HPF
URN SPEC COLLECT METH UR: ABNORMAL
UROBILINOGEN UR STRIP-ACNC: NEGATIVE EU/DL
WBC # BLD AUTO: 8.88 K/UL
WBC #/AREA URNS HPF: 5 /HPF (ref 0–5)
WBC CLUMPS URNS QL MICRO: ABNORMAL

## 2017-04-05 PROCEDURE — 87086 URINE CULTURE/COLONY COUNT: CPT

## 2017-04-05 PROCEDURE — 87186 SC STD MICRODIL/AGAR DIL: CPT

## 2017-04-05 PROCEDURE — 99284 EMERGENCY DEPT VISIT MOD MDM: CPT | Mod: 25

## 2017-04-05 PROCEDURE — 87088 URINE BACTERIA CULTURE: CPT

## 2017-04-05 PROCEDURE — 87077 CULTURE AEROBIC IDENTIFY: CPT

## 2017-04-05 PROCEDURE — 96360 HYDRATION IV INFUSION INIT: CPT

## 2017-04-05 PROCEDURE — 25000003 PHARM REV CODE 250: Performed by: EMERGENCY MEDICINE

## 2017-04-05 PROCEDURE — 80053 COMPREHEN METABOLIC PANEL: CPT

## 2017-04-05 PROCEDURE — 81000 URINALYSIS NONAUTO W/SCOPE: CPT

## 2017-04-05 PROCEDURE — 83605 ASSAY OF LACTIC ACID: CPT

## 2017-04-05 PROCEDURE — 85025 COMPLETE CBC W/AUTO DIFF WBC: CPT

## 2017-04-05 PROCEDURE — 87040 BLOOD CULTURE FOR BACTERIA: CPT | Mod: 59

## 2017-04-05 RX ORDER — CIPROFLOXACIN 500 MG/1
500 TABLET ORAL 2 TIMES DAILY
Qty: 10 TABLET | Refills: 0 | Status: SHIPPED | OUTPATIENT
Start: 2017-04-05 | End: 2017-04-10

## 2017-04-05 RX ADMIN — SODIUM CHLORIDE 1000 ML: 0.9 INJECTION, SOLUTION INTRAVENOUS at 03:04

## 2017-04-05 NOTE — TELEPHONE ENCOUNTER
----- Message from Alejandra Wang sent at 4/5/2017 11:58 AM CDT -----  Contact: pt  The states she had hysterectomy a week ago and is now having chills and nausea, pt request call back at [phone]///thxMW

## 2017-04-05 NOTE — ED PROVIDER NOTES
SCRIBE #1 NOTE: I, Jeromy Hathaway, am scribing for, and in the presence of, Sheri Aldana MD. I have scribed the entire note.      History      Chief Complaint   Patient presents with    Post-op Problem     had hysterctomy a week ago, began running fever with nasuea today, called Dr. Hackett and she said she wanted to see her in the ER today        Review of patient's allergies indicates:  No Known Allergies     HPI   HPI    2017, 2:51 PM   History obtained from the patient and       History of Present Illness: Bing Mclaughlin is a 46 y.o. female patient who presents to the Emergency Department for nausea s/p hysterectomy on 3/29 which onset gradually today. Sxs are constant and moderate in severity. Pt reports calling Dr. Hackett's (OBGYN) office and was told to come to ED. There are no mitigating or exacerbating factors noted. Associated sxs include frequency since yesterday, mild back pain, fever (Tmax 100).  Pt denies any V/D, dysuria, hematuria, constipation, blood in stool, abd pain, SOB, CP, numbness, vaginal discharge, vaginal bleeding, and all other sxs at this time. Prior tx includes 400 mg of motrin at 11:45 AM. No further complaints or concerns at this time.       Arrival mode: Personal vehicle      PCP: Ousmane Baca MD       Past Medical History:  Past Medical History:   Diagnosis Date    Hypothyroid     Migraines        Past Surgical History:  Past Surgical History:   Procedure Laterality Date    HYSTERECTOMY      TUBAL LIGATION      WRIST SURGERY      right, cyst removed.         Family History:  Family History   Problem Relation Age of Onset    Uterine cancer Mother     Cancer Mother     Cancer Sister     Deep vein thrombosis Neg Hx      labor Neg Hx     Breast cancer Neg Hx        Social History:  Social History     Social History Main Topics    Smoking status: Never Smoker    Smokeless tobacco: Never Used    Alcohol use 0.0 oz/week     0  Standard drinks or equivalent per week      Comment: occ    Drug use: No    Sexual activity: Yes     Partners: Male     Birth control/ protection: Surgical      Comment: tubal       ROS   Review of Systems   Constitutional: Positive for fever (Tmax 100).   HENT: Negative for sore throat.    Respiratory: Negative for shortness of breath.    Cardiovascular: Negative for chest pain.   Gastrointestinal: Positive for nausea. Negative for abdominal pain, blood in stool, constipation, diarrhea and vomiting.   Genitourinary: Positive for frequency. Negative for dysuria, hematuria, vaginal bleeding, vaginal discharge and vaginal pain.   Musculoskeletal: Positive for back pain.   Skin: Negative for rash.   Neurological: Negative for seizures, syncope, weakness and numbness.   Hematological: Does not bruise/bleed easily.     Physical Exam    Initial Vitals   BP Pulse Resp Temp SpO2   04/05/17 1340 04/05/17 1340 04/05/17 1340 04/05/17 1340 04/05/17 1340   161/69 112 16 99.4 °F (37.4 °C) 95 %      Physical Exam  Nursing Notes and Vital Signs Reviewed.  Constitutional: Patient is in no acute distress. Awake and alert. Well-developed and well-nourished.  Head: Atraumatic. Normocephalic.  Eyes: PERRL. EOM intact. Conjunctivae are not pale. No scleral icterus.  ENT: Mucous membranes are moist. Oropharynx is clear and symmetric.    Neck: Supple. Full ROM. No lymphadenopathy.  Cardiovascular: Regular rate. Regular rhythm. No murmurs, rubs, or gallops. Distal pulses are 2+ and symmetric.  Pulmonary/Chest: No respiratory distress. Clear to auscultation bilaterally. No wheezing, rales, or rhonchi.  Abdominal: Soft and non-distended.  There is mild suprapubic tenderness.  No rebound, guarding, or rigidity. Good bowel sounds. Multiple well healing trochar sites, no evidence of infection or drainage.   Genitourinary: No CVA tenderness  Musculoskeletal: Moves all extremities. No obvious deformities. No edema. No calf tenderness.  Skin:  "Warm and dry.   Neurological:  Alert, awake, and appropriate.  Normal speech.  No acute focal neurological deficits are appreciated.  Psychiatric: Normal affect. Good eye contact. Appropriate in content.    ED Course    Procedures  ED Vital Signs:  Vitals:    04/05/17 1340 04/05/17 1659   BP: (!) 161/69 130/71   Pulse: (!) 112 88   Resp: 16 16   Temp: 99.4 °F (37.4 °C)    TempSrc: Oral    SpO2: 95%    Weight: 81.6 kg (180 lb)    Height: 5' 5" (1.651 m)        Abnormal Lab Results:  Labs Reviewed   CBC W/ AUTO DIFFERENTIAL - Abnormal; Notable for the following:        Result Value    Gran% 78.6 (*)     Lymph% 11.1 (*)     All other components within normal limits   URINALYSIS - Abnormal; Notable for the following:     Occult Blood UA 1+ (*)     All other components within normal limits   URINALYSIS MICROSCOPIC - Abnormal; Notable for the following:     WBC Clumps, UA Occasional (*)     Bacteria, UA Few (*)     All other components within normal limits   CULTURE, URINE   CULTURE, BLOOD   CULTURE, BLOOD   CULTURE, URINE   COMPREHENSIVE METABOLIC PANEL   LACTIC ACID, PLASMA        All Lab Results:  Results for orders placed or performed during the hospital encounter of 04/05/17   CBC auto differential   Result Value Ref Range    WBC 8.88 3.90 - 12.70 K/uL    RBC 4.45 4.00 - 5.40 M/uL    Hemoglobin 13.5 12.0 - 16.0 g/dL    Hematocrit 39.2 37.0 - 48.5 %    MCV 88 82 - 98 fL    MCH 30.3 27.0 - 31.0 pg    MCHC 34.4 32.0 - 36.0 %    RDW 12.3 11.5 - 14.5 %    Platelets 263 150 - 350 K/uL    MPV 11.0 9.2 - 12.9 fL    Gran # 7.0 1.8 - 7.7 K/uL    Lymph # 1.0 1.0 - 4.8 K/uL    Mono # 0.8 0.3 - 1.0 K/uL    Eos # 0.1 0.0 - 0.5 K/uL    Baso # 0.04 0.00 - 0.20 K/uL    Gran% 78.6 (H) 38.0 - 73.0 %    Lymph% 11.1 (L) 18.0 - 48.0 %    Mono% 8.9 4.0 - 15.0 %    Eosinophil% 0.9 0.0 - 8.0 %    Basophil% 0.5 0.0 - 1.9 %    Differential Method Automated    Comprehensive metabolic panel   Result Value Ref Range    Sodium 137 136 - 145 mmol/L "    Potassium 4.8 3.5 - 5.1 mmol/L    Chloride 104 95 - 110 mmol/L    CO2 24 23 - 29 mmol/L    Glucose 94 70 - 110 mg/dL    BUN, Bld 12 6 - 20 mg/dL    Creatinine 0.8 0.5 - 1.4 mg/dL    Calcium 9.5 8.7 - 10.5 mg/dL    Total Protein 7.4 6.0 - 8.4 g/dL    Albumin 3.8 3.5 - 5.2 g/dL    Total Bilirubin 0.6 0.1 - 1.0 mg/dL    Alkaline Phosphatase 78 55 - 135 U/L    AST 14 10 - 40 U/L    ALT 12 10 - 44 U/L    Anion Gap 9 8 - 16 mmol/L    eGFR if African American >60 >60 mL/min/1.73 m^2    eGFR if non African American >60 >60 mL/min/1.73 m^2   Lactic acid, plasma   Result Value Ref Range    Lactate (Lactic Acid) 1.2 0.5 - 2.2 mmol/L   Urinalysis Clean Catch   Result Value Ref Range    Specimen UA Urine, Clean Catch     Color, UA Yellow Yellow, Straw, Yael    Appearance, UA Clear Clear    pH, UA 6.0 5.0 - 8.0    Specific Gravity, UA 1.015 1.005 - 1.030    Protein, UA Negative Negative    Glucose, UA Negative Negative    Ketones, UA Negative Negative    Bilirubin (UA) Negative Negative    Occult Blood UA 1+ (A) Negative    Nitrite, UA Negative Negative    Urobilinogen, UA Negative <2.0 EU/dL    Leukocytes, UA Negative Negative   Urinalysis Microscopic   Result Value Ref Range    RBC, UA 3 0 - 4 /hpf    WBC, UA 5 0 - 5 /hpf    WBC Clumps, UA Occasional (A) None-Rare    Bacteria, UA Few (A) None-Occ /hpf    Squam Epithel, UA 2 /hpf    Microscopic Comment SEE COMMENT          Imaging Results:  Imaging Results         X-Ray Chest PA And Lateral (Final result) Result time:  04/05/17 15:24:43    Final result by Sherri Rangel MD (04/05/17 15:24:43)    Impression:         Negative chest radiograph.            Electronically signed by: SHERRI RANGEL MD  Date:     04/05/17  Time:    15:24     Narrative:    Exam: XR CHEST PA AND LATERAL    Clinical History:   Cough and congestion     Findings:   The lungs are clear. The cardiac silhouette is within normal limits.                      The Emergency Provider reviewed the vital signs and  test results, which are outlined above.    ED Discussion     5:12 PM: Reassessed pt. Discussed with pt all pertinent ED information and results. No evidence of sepsis, low grade temp at home, afebrile here, will tx for mild UTI, cultures sent. Discussed plan of treatment with pt. Gave pt all f/u and return to the ED instructions. All questions and concerns were addressed at this time. Pt understands and agrees to plan as discussed. Pt is stable for discharge.     Pre-hypertension/Hypertension: The pt has been informed that they may have pre-hypertension or hypertension based on a blood pressure reading in the ED. I recommend that the pt call the PCP listed on their discharge instructions or a physician of their choice this week to arrange f/u for further evaluation of possible pre-hypertension or hypertension.       ED Medication(s):  Medications   sodium chloride 0.9% bolus 1,000 mL (0 mLs Intravenous Stopped 4/5/17 1328)       Discharge Medication List as of 4/5/2017  4:58 PM      START taking these medications    Details   ciprofloxacin HCl (CIPRO) 500 MG tablet Take 1 tablet (500 mg total) by mouth 2 (two) times daily., Starting 4/5/2017, Until Mon 4/10/17, Normal             Follow-up Information     Follow up with Samia Hackett MD. Schedule an appointment as soon as possible for a visit in 1 day.    Specialties:  Gynecology, Obstetrics and Gynecology, Obstetrics    Why:  Return to the Emergency Room, If symptoms worsen    Contact information:    [address]  [place] [address]  [phone]              Medical Decision Making    Medical Decision Making:   Clinical Tests:   Lab Tests: Reviewed and Ordered  Radiological Study: Ordered and Reviewed           Scribe Attestation:   Scribe #1: I performed the above scribed service and the documentation accurately describes the services I performed. I attest to the accuracy of the note.    Attending:   Physician Attestation Statement for Scribe #1: I,  Sheri Aldana MD, personally performed the services described in this documentation, as scribed by Jeromy Hathaway, in my presence, and it is both accurate and complete.          Clinical Impression       ICD-10-CM ICD-9-CM   1. Fever, unspecified fever cause R50.9 780.60   2. Cough R05 786.2   3. Post-operative state Z98.890 V45.89   4. Abnormal urine R82.90 791.9       Disposition:   Disposition: Discharged  Condition: Stable         Sheri Aldana MD  04/05/17 1745

## 2017-04-05 NOTE — ED NOTES
Appearance: Sitting up in bed with no acute distress noted.  Pt reports she had a fever earlier today.  HEENT: Atraumatic. Mucous membranes moist and intact.   Skin: Skin is warm and dry with good skin turgor; Skin is intact besides small trochar sites from hysterectomy 1 week ago; color consistent with ethnicity. Mucous membranes are moist.   Musculoskeletal: Moves all extremities well in full range of motion. No obvious deformities or swelling noted.   Respiratory: Airway open and patent. Respirations spontaneous, even and unlabored. No accessory muscles in use.   Cardiac: Regular rate and rhythm. No peripheral edema noted. 2+ pulses palpated peripherally. Capillary refill < 3 seconds.   Abdomen: Soft, non-tender to palpation. No distention noted.   Neurologic: Alert, awake, and appropriate. Normal speech. No acute neurological deficits noted.

## 2017-04-05 NOTE — TELEPHONE ENCOUNTER
Spoke with patient regarding having nausea and chills. Patient stated that she has a temp of 100 and is having tightness in her chest and back when she breathes. She stated that she has taken 400mg of motrin but no pain medication. After speaking with Dr. Hackett I informed the patient to report to the ER on [address]. Patient verbalized understanding.

## 2017-04-05 NOTE — ED AVS SNAPSHOT
[facility] -   [address] [facility] [address]               Bing Cummingsuthirds   2017  2:47 PM   ED    Description:  Female : 1970   Department:  [facility]           Your Care was Coordinated By:     Provider Role From To    Sheri Aldana MD Attending Provider 17 [id number] --      Reason for Visit     Post-op Problem           Diagnoses this Visit        Comments    Fever, unspecified fever cause    -  Primary     Cough         Post-operative state         Abnormal urine           ED Disposition     None           To Do List           Follow-up Information     Follow up with Samia Hackett MD. Schedule an appointment as soon as possible for a visit in 1 day.    Specialties:  Gynecology, Obstetrics and Gynecology, Obstetrics    Why:  Return to the Emergency Room, If symptoms worsen    Contact information:    [address]  [facility] [address]  [phone]         These Medications        Disp Refills Start End    ciprofloxacin HCl (CIPRO) 500 MG tablet 10 tablet 0 2017 4/10/2017    Take 1 tablet (500 mg total) by mouth 2 (two) times daily. - Oral    Pharmacy: [facility] [address] #: [phone]         [facility]     [facility] -  Assistance  Unless otherwise directed by your provider, please contact [facility], our nurse care line that is available for  assistance.     Registered nurses in the [facility] provide: appointment scheduling, clinical advisement, health education, and other advisory services.  Call: [phone] (toll free)               Medications           Message regarding Medications     Verify the changes and/or additions to your medication regime listed below are the same as discussed with your clinician today.  If any of these changes or additions are incorrect, please notify your healthcare provider.    "     START taking these NEW medications        Refills    ciprofloxacin HCl (CIPRO) 500 MG tablet 0    Sig: Take 1 tablet (500 mg total) by mouth 2 (two) times daily.    Class: Normal    Route: Oral      These medications were administered today        Dose Freq    sodium chloride 0.9% bolus 1,000 mL 1,000 mL ED 1 Time    Sig: Inject 1,000 mLs into the vein ED 1 Time.    Class: Normal    Route: Intravenous           Verify that the below list of medications is an accurate representation of the medications you are currently taking.  If none reported, the list may be blank. If incorrect, please contact your healthcare provider. Carry this list with you in case of emergency.           Current Medications     levothyroxine (SYNTHROID, LEVOTHROID) 175 MCG tablet Take 1 tablet (175 mcg total) by mouth before breakfast.    oxycodone-acetaminophen (ROXICET) 5-325 mg per tablet Take 1 tablet by mouth every 6 (six) hours as needed for Pain.    butalbital-aspirin-caffeine (BUTALBITAL COMPOUND) -40 mg Tab Take 1 tablet by mouth 2 (two) times daily as needed.    ciprofloxacin HCl (CIPRO) 500 MG tablet Take 1 tablet (500 mg total) by mouth 2 (two) times daily.           Clinical Reference Information           Your Vitals Were     BP Pulse Temp Resp Height Weight    161/69 (BP Location: Right arm, Patient Position: Sitting) 112 99.4 °F (37.4 °C) (Oral) 16 5' 5" (1.651 m) 81.6 kg (180 lb)    Last Period SpO2 BMI          (Exact Date) 95% 29.95 kg/m2        Allergies as of 4/5/2017     No Known Allergies      Immunizations Administered on Date of Encounter - 4/5/2017     None      ED Micro, Lab, POCT     Start Ordered       Status Ordering Provider    04/05/17 1652 04/05/17 1651  Urine culture  Add-on      Completed     04/05/17 1504 04/05/17 1503  CBC auto differential  STAT      Final result     04/05/17 1504 04/05/17 1503  Comprehensive metabolic panel  STAT      Final result     04/05/17 1504 04/05/17 1503  Lactic acid, " plasma  STAT      Final result     04/05/17 1504 04/05/17 1503  Blood Culture #1 **CANNOT BE ORDERED STAT**  Once      In process     04/05/17 1504 04/05/17 1503  Blood Culture #2 **CANNOT BE ORDERED STAT**  Once      In process     04/05/17 1504 04/05/17 1503  Urinalysis Clean Catch  STAT      Final result     04/05/17 1503 04/05/17 1503  Urinalysis Microscopic  Once      Final result     04/05/17 1503 04/05/17 1503  Urine culture  Once      In process       ED Imaging Orders     Start Ordered       Status Ordering Provider    04/05/17 1504 04/05/17 1503  X-Ray Chest PA And Lateral  1 time imaging      Final result       Discharge References/Attachments     FEBRILE ILLNESS, UNCERTAIN CAUSE (ADULT) (ENGLISH)      Your Scheduled Appointments     May 04, 2017 10:00 AM CDT   Post OP with Samia Hackett MD   [facility] ([facility])    [address]  [place] [id number] [phone]            May 04, 2017 11:25 AM CDT   Non-Fasting Lab with [facility], [facility]    [address] [place] [address] [phone]               [facility]  complies with applicable Federal civil rights laws and does not discriminate on the basis of race, color, national origin, age, disability, or sex.        Language Assistance Services     ATTENTION: Language assistance services are available, free of charge. Please call [phone].      ATENCIÓN: Si habla español, tiene a guevara disposición servicios gratuitos de asistencia lingüística. Llame al [phone].     CHÚ Ý: N?u b?n nói Ti?ng Vi?t, có các d?ch v? h? tr? ngôn ng? mi?n phí dành cho b?n. G?i s? [phone].

## 2017-04-06 ENCOUNTER — TELEPHONE (OUTPATIENT)
Dept: OBSTETRICS AND GYNECOLOGY | Facility: CLINIC | Age: 47
End: 2017-04-06

## 2017-04-06 NOTE — PROGRESS NOTES
Reviewed pathology and surgery.  Patient with no complaints. Fever broke yesterday and feels much better.   Has flatus and normal daily bowel movements, no nausea, vomiting, fever, chest pain, shortness of breath or palpitations. There are no range of movement or sensory problems.

## 2017-04-06 NOTE — TELEPHONE ENCOUNTER
----- Message from Samia Hackett MD sent at 4/6/2017 10:10 AM CDT -----  Reviewed pathology and surgery.  Patient with no complaints. Fever broke yesterday and feels much better.   Has flatus and normal daily bowel movements, no nausea, vomiting, fever, chest pain, shortness of breath or palpitations. There are no range of movement or sensory problems.

## 2017-04-06 NOTE — TELEPHONE ENCOUNTER
F/U call placed to pt, pt states she is doing much better, no change since she spoke with Dr. Hackett this morning; pt understands to call us if needed.

## 2017-04-08 LAB — BACTERIA UR CULT: NORMAL

## 2017-04-10 LAB
BACTERIA BLD CULT: NORMAL
BACTERIA BLD CULT: NORMAL

## 2017-04-12 ENCOUNTER — PATIENT MESSAGE (OUTPATIENT)
Dept: OBSTETRICS AND GYNECOLOGY | Facility: CLINIC | Age: 47
End: 2017-04-12

## 2017-04-17 ENCOUNTER — PATIENT MESSAGE (OUTPATIENT)
Dept: OBSTETRICS AND GYNECOLOGY | Facility: CLINIC | Age: 47
End: 2017-04-17

## 2017-05-04 ENCOUNTER — OFFICE VISIT (OUTPATIENT)
Dept: OBSTETRICS AND GYNECOLOGY | Facility: CLINIC | Age: 47
End: 2017-05-04
Payer: COMMERCIAL

## 2017-05-04 ENCOUNTER — LAB VISIT (OUTPATIENT)
Dept: LAB | Facility: HOSPITAL | Age: 47
End: 2017-05-04
Attending: INTERNAL MEDICINE
Payer: COMMERCIAL

## 2017-05-04 VITALS
DIASTOLIC BLOOD PRESSURE: 76 MMHG | WEIGHT: 196 LBS | SYSTOLIC BLOOD PRESSURE: 122 MMHG | HEIGHT: 65 IN | BODY MASS INDEX: 32.65 KG/M2

## 2017-05-04 DIAGNOSIS — E03.9 ACQUIRED HYPOTHYROIDISM: ICD-10-CM

## 2017-05-04 DIAGNOSIS — Z09 POSTOP CHECK: Primary | ICD-10-CM

## 2017-05-04 LAB — TSH SERPL DL<=0.005 MIU/L-ACNC: 0.62 UIU/ML

## 2017-05-04 PROCEDURE — 36415 COLL VENOUS BLD VENIPUNCTURE: CPT | Mod: PO

## 2017-05-04 PROCEDURE — 99024 POSTOP FOLLOW-UP VISIT: CPT | Mod: S$GLB,,, | Performed by: OBSTETRICS & GYNECOLOGY

## 2017-05-04 PROCEDURE — 99999 PR PBB SHADOW E&M-EST. PATIENT-LVL II: CPT | Mod: PBBFAC,,, | Performed by: OBSTETRICS & GYNECOLOGY

## 2017-05-04 PROCEDURE — 84443 ASSAY THYROID STIM HORMONE: CPT

## 2017-05-04 NOTE — PROGRESS NOTES
Bing Mclaughlin is a 46 y.o. female  post-op from a hysterectomy    3/29/2017   PRE-PROCEDURE COUNSELING  Patient counseled on the risks, benefits, and alternatives to procedure. Please see preoperative consents.   SURGEON: Samia Hackett M.D.   ASSISTANT: Gris Flores, and [facility]   PREOPERATIVE DIAGNOSES: Dysmenorrhea, pelvic pain, L complex ov cyst, adhesions, suspected endometriosis, simple R ov cyst    POSTOPERATIVE DIAGNOSES: Same, simple R ov cyst    PROCEDURE:   Robotic-assisted laparoscopic hysterectomy, left salpingo-oophorectomy, right salpingectomy, adhesiolysis 15min (right ovary was preserved)   EBL: 20mL    FINDINGS: boggy uterus, benign appearing pelvic organs, L ovary with cyst adhesed along with tube to the L pelvic gutter and the left posterior uterine surface. 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          There are no complaints and the procedure and hospitalization occured without complications.     The operative findings and pathology were reviewed with the patient.     FINAL PATHOLOGIC DIAGNOSIS  Uterus, cervix, left ovary and bilateral fallopian tubes:  Bilateral fallopian tubes: No significant histologic abnormality.  Left ovary: Benign hemorrhagic corpus luteum cyst and normal physiologic structures.  Cervix: Benign nabothian cysts.  Endometrium: Secretory pattern.  Myometrium: Adenomyosis.  Serosa: No significant histologic abnormality.    PE:  APPEARANCE: Well nourished, well developed, in no acute distress.   ABDOMEN: Soft. No tenderness or masses. No hepatosplenomegaly. No hernias. Incisions intact, clean and dry  PELVIC: vaginal cuff intact no masses or bleeding.    Assessment: Routine postoperative follow-up exam      Follow-up with me as needed.

## 2017-06-01 ENCOUNTER — OFFICE VISIT (OUTPATIENT)
Dept: OBSTETRICS AND GYNECOLOGY | Facility: CLINIC | Age: 47
End: 2017-06-01
Payer: COMMERCIAL

## 2017-06-01 VITALS
WEIGHT: 198.44 LBS | SYSTOLIC BLOOD PRESSURE: 132 MMHG | DIASTOLIC BLOOD PRESSURE: 84 MMHG | HEIGHT: 65 IN | BODY MASS INDEX: 33.06 KG/M2

## 2017-06-01 DIAGNOSIS — Z09 POSTOP CHECK: Primary | ICD-10-CM

## 2017-06-01 PROCEDURE — 99999 PR PBB SHADOW E&M-EST. PATIENT-LVL II: CPT | Mod: PBBFAC,,, | Performed by: OBSTETRICS & GYNECOLOGY

## 2017-06-01 PROCEDURE — 99024 POSTOP FOLLOW-UP VISIT: CPT | Mod: S$GLB,,, | Performed by: OBSTETRICS & GYNECOLOGY

## 2017-06-01 NOTE — PROGRESS NOTES
3/29/2017   PRE-PROCEDURE COUNSELING  Patient counseled on the risks, benefits, and alternatives to procedure. Please see preoperative consents.   SURGEON: Samia Hackett M.D.   ASSISTANT: Gris Flores, and Lynn WHITEs   PREOPERATIVE DIAGNOSES: Dysmenorrhea, pelvic pain, L complex ov cyst, adhesions, suspected endometriosis, simple R ov cyst    POSTOPERATIVE DIAGNOSES: Same, simple R ov cyst    PROCEDURE:   Robotic-assisted laparoscopic hysterectomy, left salpingo-oophorectomy, right salpingectomy, adhesiolysis 15min (right ovary was preserved)   EBL: 20mL    FINDINGS: boggy uterus, benign appearing pelvic organs, L ovary with cyst adhesed along with tube to the L pelvic gutter and the left posterior uterine surface. 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           There are no complaints and the procedure and hospitalization occured without complications.   Pt came in again because had a small amount dark spotting beginning of this wk. stoppe dsince. Did not have sex yet.     The operative findings and pathology were reviewed with the patient.      FINAL PATHOLOGIC DIAGNOSIS  Uterus, cervix, left ovary and bilateral fallopian tubes:  Bilateral fallopian tubes: No significant histologic abnormality.  Left ovary: Benign hemorrhagic corpus luteum cyst and normal physiologic structures.  Cervix: Benign nabothian cysts.  Endometrium: Secretory pattern.  Myometrium: Adenomyosis.  Serosa: No significant histologic abnormality.     PE:  APPEARANCE: Well nourished, well developed, in no acute distress.   ABDOMEN: Soft. No tenderness or masses. No hepatosplenomegaly. No hernias. Incisions intact, clean and dry  PELVIC: vaginal cuff intact no masses or bleeding.    Assessment: Routine postoperative follow-up exam  Reassurance given - likely suture dissolution    Follow-up with me as  needed.

## 2018-04-02 DIAGNOSIS — Z91.89 AT RISK FOR CORONARY ARTERY DISEASE: ICD-10-CM

## 2018-04-30 ENCOUNTER — PATIENT OUTREACH (OUTPATIENT)
Dept: ADMINISTRATIVE | Facility: HOSPITAL | Age: 48
End: 2018-04-30

## 2018-04-30 NOTE — PROGRESS NOTES
Pre visit chart audit. PREVISIT CHART AUDIT LETTER SENT VIA PATIENT PORTAL. Mammogram order pended.

## 2018-05-08 NOTE — PROGRESS NOTES
"Subjective:      Patient ID: Bing Mclaughlin is a 47 y.o. female.    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  Had left ovary and hys removed.  Has mild residual left low back pain.Seeing metabolic specialist.  Doing cross fit 4d per week.  Energy good.  No f/c/sw/cough.  Having some nasal sinus drip.  Better with allegra and singulair.  No cp/sob/palp.  BMs and urine normal.  No vit D.  Has lost 16# with exercise and diet.  Right shoulder pain since crossfit.  To have an MRI.        Past Medical History:   Diagnosis Date    Hypothyroid     Migraines        Past Surgical History:   Procedure Laterality Date    HYSTERECTOMY      OOPHORECTOMY      left ovary    TUBAL LIGATION      WRIST SURGERY      right, cyst removed.         HM:  9/17 fluvax, 9/17 ulskuj13, 1/17 TDaP, 1/16 MMG, 4/16 Gyn Dr. Hackett.       Review of Systems   Constitutional: Negative for appetite change, chills, diaphoresis and fever.   HENT: Negative for congestion, ear pain, rhinorrhea, sinus pressure and sore throat.    Respiratory: Negative for cough, chest tightness and shortness of breath.    Cardiovascular: Negative for chest pain and palpitations.   Gastrointestinal: Negative for blood in stool, constipation, diarrhea, nausea and vomiting.   Genitourinary: Negative for dysuria, frequency, hematuria, menstrual problem, urgency and vaginal discharge.   Musculoskeletal: Negative for arthralgias.   Skin: Negative for rash.   Neurological: Negative for dizziness and headaches.   Psychiatric/Behavioral: Negative for sleep disturbance. The patient is not nervous/anxious.          Objective:   /72   Pulse 72   Temp 97.1 °F (36.2 °C) (Tympanic)   Ht 5' 5" (1.651 m)   Wt 86.2 kg (190 lb 0.6 oz)   LMP 02/25/2017   SpO2 99%   BMI 31.62 kg/m²     Physical Exam   Constitutional: She is oriented to person, place, and time. She appears well-developed and well-nourished. "   HENT:   Right Ear: External ear normal. Tympanic membrane is not injected.   Left Ear: External ear normal. Tympanic membrane is not injected.   Mouth/Throat: Oropharynx is clear and moist.   Eyes: Conjunctivae are normal.   Neck: Normal range of motion. Neck supple. No thyromegaly present.   Cardiovascular: Normal rate, regular rhythm and intact distal pulses.  Exam reveals no gallop and no friction rub.    No murmur heard.  Pulmonary/Chest: Effort normal and breath sounds normal. She has no wheezes. She has no rales.   Abdominal: Soft. Bowel sounds are normal. She exhibits no mass. There is no tenderness.   Musculoskeletal: She exhibits no edema.   Lymphadenopathy:     She has no cervical adenopathy.   Neurological: She is alert and oriented to person, place, and time.   Skin: Skin is warm. No rash noted.   Psychiatric: She has a normal mood and affect.       Results for orders placed or performed in visit on 05/11/18   Comprehensive metabolic panel   Result Value Ref Range    Sodium 141 136 - 145 mmol/L    Potassium 5.2 (H) 3.5 - 5.1 mmol/L    Chloride 108 95 - 110 mmol/L    CO2 24 23 - 29 mmol/L    Glucose 94 70 - 110 mg/dL    BUN, Bld 17 6 - 20 mg/dL    Creatinine 0.8 0.5 - 1.4 mg/dL    Calcium 9.7 8.7 - 10.5 mg/dL    Total Protein 7.0 6.0 - 8.4 g/dL    Albumin 3.7 3.5 - 5.2 g/dL    Total Bilirubin 0.6 0.1 - 1.0 mg/dL    Alkaline Phosphatase 76 55 - 135 U/L    AST 26 10 - 40 U/L    ALT 20 10 - 44 U/L    Anion Gap 9 8 - 16 mmol/L    eGFR if African American >60 >60 mL/min/1.73 m^2    eGFR if non African American >60 >60 mL/min/1.73 m^2   CBC Without Differential   Result Value Ref Range    WBC 6.15 3.90 - 12.70 K/uL    RBC 4.30 4.00 - 5.40 M/uL    Hemoglobin 13.4 12.0 - 16.0 g/dL    Hematocrit 39.8 37.0 - 48.5 %    MCV 93 82 - 98 fL    MCH 31.2 (H) 27.0 - 31.0 pg    MCHC 33.7 32.0 - 36.0 g/dL    RDW 12.8 11.5 - 14.5 %    Platelets 246 150 - 350 K/uL    MPV 11.5 9.2 - 12.9 fL   Lipid panel   Result Value Ref  Range    Cholesterol 142 120 - 199 mg/dL    Triglycerides 57 30 - 150 mg/dL    HDL 50 40 - 75 mg/dL    LDL Cholesterol 80.6 63.0 - 159.0 mg/dL    HDL/Chol Ratio 35.2 20.0 - 50.0 %    Total Cholesterol/HDL Ratio 2.8 2.0 - 5.0    Non-HDL Cholesterol 92 mg/dL     CT Ca score zero.    Assessment:       1. Encounter for preventive health examination    2. Acquired hypothyroidism    3. Migraine without aura and without status migrainosus, not intractable    4. Endometriosis of pelvis          Plan:     Encounter for preventive health examination- Report results when available.  -     Ambulatory consult to Gynecology  -     Mammo Digital Screening Bilat with CAD; Future; Expected date: 05/11/2018    Acquired hypothyroidism    Migraine without aura and without status migrainosus, not intractable    Endometriosis of pelvis s/p surgery, ongoing pain- f/u with Gyn.

## 2018-05-11 ENCOUNTER — HOSPITAL ENCOUNTER (OUTPATIENT)
Dept: RADIOLOGY | Facility: HOSPITAL | Age: 48
Discharge: HOME | End: 2018-05-11
Attending: INTERNAL MEDICINE
Payer: COMMERCIAL

## 2018-05-11 ENCOUNTER — NUTRITION (OUTPATIENT)
Dept: DIABETES | Facility: CLINIC | Age: 48
End: 2018-05-11

## 2018-05-11 ENCOUNTER — CLINICAL SUPPORT (OUTPATIENT)
Dept: INTERNAL MEDICINE | Facility: CLINIC | Age: 48
End: 2018-05-11
Payer: COMMERCIAL

## 2018-05-11 ENCOUNTER — OFFICE VISIT (OUTPATIENT)
Dept: INTERNAL MEDICINE | Facility: CLINIC | Age: 48
End: 2018-05-11

## 2018-05-11 VITALS
BODY MASS INDEX: 31.67 KG/M2 | WEIGHT: 188.94 LBS | SYSTOLIC BLOOD PRESSURE: 118 MMHG | WEIGHT: 190.06 LBS | DIASTOLIC BLOOD PRESSURE: 72 MMHG | HEIGHT: 65 IN | HEIGHT: 66 IN | RESPIRATION RATE: 14 BRPM | HEART RATE: 72 BPM | BODY MASS INDEX: 30.36 KG/M2

## 2018-05-11 VITALS
BODY MASS INDEX: 31.67 KG/M2 | DIASTOLIC BLOOD PRESSURE: 72 MMHG | HEART RATE: 72 BPM | SYSTOLIC BLOOD PRESSURE: 118 MMHG | HEIGHT: 65 IN | WEIGHT: 190.06 LBS | OXYGEN SATURATION: 99 % | TEMPERATURE: 97 F

## 2018-05-11 DIAGNOSIS — E55.9 VITAMIN D DEFICIENCY: ICD-10-CM

## 2018-05-11 DIAGNOSIS — E03.9 ACQUIRED HYPOTHYROIDISM: ICD-10-CM

## 2018-05-11 DIAGNOSIS — Z00.00 ENCOUNTER FOR PREVENTIVE HEALTH EXAMINATION: Primary | ICD-10-CM

## 2018-05-11 DIAGNOSIS — Z91.89 AT RISK FOR CORONARY ARTERY DISEASE: ICD-10-CM

## 2018-05-11 DIAGNOSIS — Z00.00 ROUTINE GENERAL MEDICAL EXAMINATION AT A HEALTH CARE FACILITY: Primary | ICD-10-CM

## 2018-05-11 DIAGNOSIS — G43.009 MIGRAINE WITHOUT AURA AND WITHOUT STATUS MIGRAINOSUS, NOT INTRACTABLE: ICD-10-CM

## 2018-05-11 DIAGNOSIS — Z00.8 NUTRITIONAL ASSESSMENT: Primary | ICD-10-CM

## 2018-05-11 LAB
25(OH)D3+25(OH)D2 SERPL-MCNC: 24 NG/ML
ALBUMIN SERPL BCP-MCNC: 3.7 G/DL
ALP SERPL-CCNC: 76 U/L
ALT SERPL W/O P-5'-P-CCNC: 20 U/L
ANION GAP SERPL CALC-SCNC: 9 MMOL/L
AST SERPL-CCNC: 26 U/L
BILIRUB SERPL-MCNC: 0.6 MG/DL
BUN SERPL-MCNC: 17 MG/DL
CALCIUM SERPL-MCNC: 9.7 MG/DL
CHLORIDE SERPL-SCNC: 108 MMOL/L
CHOLEST SERPL-MCNC: 142 MG/DL
CHOLEST/HDLC SERPL: 2.8 {RATIO}
CO2 SERPL-SCNC: 24 MMOL/L
CREAT SERPL-MCNC: 0.8 MG/DL
ERYTHROCYTE [DISTWIDTH] IN BLOOD BY AUTOMATED COUNT: 12.8 %
EST. GFR  (AFRICAN AMERICAN): >60 ML/MIN/1.73 M^2
EST. GFR  (NON AFRICAN AMERICAN): >60 ML/MIN/1.73 M^2
ESTIMATED AVG GLUCOSE: 94 MG/DL
GLUCOSE SERPL-MCNC: 94 MG/DL
HBA1C MFR BLD HPLC: 4.9 %
HCT VFR BLD AUTO: 39.8 %
HDLC SERPL-MCNC: 50 MG/DL
HDLC SERPL: 35.2 %
HGB BLD-MCNC: 13.4 G/DL
LDLC SERPL CALC-MCNC: 80.6 MG/DL
MCH RBC QN AUTO: 31.2 PG
MCHC RBC AUTO-ENTMCNC: 33.7 G/DL
MCV RBC AUTO: 93 FL
NONHDLC SERPL-MCNC: 92 MG/DL
PLATELET # BLD AUTO: 246 K/UL
PMV BLD AUTO: 11.5 FL
POTASSIUM SERPL-SCNC: 5.2 MMOL/L
PROT SERPL-MCNC: 7 G/DL
RBC # BLD AUTO: 4.3 M/UL
SODIUM SERPL-SCNC: 141 MMOL/L
TRIGL SERPL-MCNC: 57 MG/DL
TSH SERPL DL<=0.005 MIU/L-ACNC: 1.7 UIU/ML
WBC # BLD AUTO: 6.15 K/UL

## 2018-05-11 PROCEDURE — 84443 ASSAY THYROID STIM HORMONE: CPT

## 2018-05-11 PROCEDURE — 82306 VITAMIN D 25 HYDROXY: CPT

## 2018-05-11 PROCEDURE — 80061 LIPID PANEL: CPT | Mod: PO

## 2018-05-11 PROCEDURE — 99999 PR PBB SHADOW E&M-EST. PATIENT-LVL II: CPT | Mod: PBBFAC,,, | Performed by: DIETITIAN, REGISTERED

## 2018-05-11 PROCEDURE — 97802 MEDICAL NUTRITION INDIV IN: CPT | Mod: ,,, | Performed by: DIETITIAN, REGISTERED

## 2018-05-11 PROCEDURE — 99999 PR PBB SHADOW E&M-EST. PATIENT-LVL III: CPT | Mod: PBBFAC,,, | Performed by: INTERNAL MEDICINE

## 2018-05-11 PROCEDURE — 85027 COMPLETE CBC AUTOMATED: CPT | Mod: PO

## 2018-05-11 PROCEDURE — 75571 CT HRT W/O DYE W/CA TEST: CPT | Mod: 26,,, | Performed by: RADIOLOGY

## 2018-05-11 PROCEDURE — 83036 HEMOGLOBIN GLYCOSYLATED A1C: CPT

## 2018-05-11 PROCEDURE — 80053 COMPREHEN METABOLIC PANEL: CPT | Mod: PO

## 2018-05-11 PROCEDURE — 75571 CT HRT W/O DYE W/CA TEST: CPT | Mod: TC,PO

## 2018-05-11 PROCEDURE — 99386 PREV VISIT NEW AGE 40-64: CPT | Mod: ,,, | Performed by: INTERNAL MEDICINE

## 2018-05-11 RX ORDER — LEVOTHYROXINE SODIUM 137 UG/1
137 TABLET ORAL
COMMUNITY
End: 2019-04-05

## 2018-05-11 RX ORDER — MONTELUKAST SODIUM 10 MG/1
10 TABLET ORAL NIGHTLY
COMMUNITY

## 2018-05-11 RX ORDER — ALBUTEROL SULFATE 90 UG/1
AEROSOL, METERED RESPIRATORY (INHALATION)
COMMUNITY
End: 2021-11-19

## 2018-05-11 NOTE — PROGRESS NOTES
"PCP: Ousmane Baca MD   REFERRING PROVIDER: No ref. provider found     HISTORY OF PRESENT ILLNESS: 47 y.o. female patient is in clinic today for executive health visit. Pt is doing [employer] periodization food program w/ online support as part of her [employer] routine.     VITAL SIGNS:  Height: 5' 5" (165.1 cm)   Weight: 86.2 kg (190 lb 0.6 oz)   IBW: 125 lbs +/-10%    ALLERGIES & MEDICATIONS: Reviewed and Reconciled  MEDICAL/SURGICAL & FAMILY HISTORY: Reviewed and Reconciled    LABORATORY: Reviewed Diabetes Management Flowsheet and Results Review.    SELF-MONITORING: Food - program food charts avail for review.     ACTIVITY LEVEL:  Cross-fit 60min 4d/wk     NUTRITION INTAKE: Meal patterns include 3 meals, 1-2 snacks daily with intake ~1200-1700cals/d. Patient is limiting carbohydrates, saturated fat or sodium.  [employer] periodization food program daily breakdown: ~ grm carb, ~180grm pro; ~35 grm fat.     PSYCHOSOCIAL: Stage of change - action; Barriers to change - none.    MNT ASSESSMENT (daily maintenance level with 150min mod activity/wk):   1400 calories, 90grm protein daily  30 grams carb/meal, 5-15 grams carb/snack (135-150grm/d)  increase fruit 2 serv/d, vegetables 2+ cups/d, whole grains 3+serv/d, lowfat dairy 3+serv/d  low-fat, low-sodium    PLAN:   Reviewed MNT guidelines for general wellness, weight management.     Encouraged daily self-monitoring of food and activity patterns, return records to clinic.   Provided meal-planning instruction via foodlists, plate method, food models, food labels and/or ADA booklet. Reviewed micro/macronutrient effect on health management. Discussed carbohydrate counting and spacing techniques with emphasis on cardiovascular wellness health strategies, functional foods. Reviewed principles of energy metabolism to assist weight and health management. Provided matl's for maintenance level food plan if she decides to transition from cross-fit meal plan or wants " more balance of food choices. Discussed myfitness pal food tracker as additional online support feedback tool.   Discussed importance of daily physical activity with review of benefits, methods, guidelines and precautions.   Discussed behavioral strategies for improving social and environmental support of lifestyle changes.    GOALS: Self-monitoring: Daily food & activity journal. Meal Plan: 90% Accuracy. Activity:150 min/wk.    Visit Time Spent:  60 minutes  Thank you for the opportunity to work with your patient.

## 2018-05-11 NOTE — LETTER
May 16, 2018    Bing Mclaughlin  [address] [place] [address]             [facility]  [address] [address]  Phone: [phone]  Fax: [phone] [facility] #[id number]    Bing Mclaughlin,  [address], [place], [address]    Dear Ms. Mclaughlin:    It was a pleasure to see you again and perform your  Executive Physical on May 11, 2018.  At the time of  your visit, you are 47 years old.  You have had  hysterectomy with left ovary removal.  You have some  mild residual left low back pain.  You have been seeing  your metabolic specialist.  You are doing CrossFit four  days per week.  You reported good energy.  You have had  some nasal drainage, which is improved with Allegra and  Singulair.  You deny any fevers, chills, or sweats.   You deny any chest pain, shortness of breath, or  palpitations.  Your bowel and bladder function has been  normal.  You do not take any regular vitamin D  supplementation.  You have lost 16 pounds with diet and  exercise.  You have had right shoulder pain since  CrossFit trauma and plan to have an MRI for further  evaluation.    Your past medical history is significant for migraines  and hypothyroidism.  You have had a hysterectomy and  left oophorectomy, tubal ligation, and right wrist  surgery.    Your medications include Allegra, levothyroxine,  Singulair, and albuterol.      You have no known medication allergies.    Your health maintenance includes in September 2017,  Fluvax; in September 2017, Prevnar vaccination; in  January 2017, tetanus, diphtheria, pertussis  vaccination; in January 2016, mammogram; in April 2016,  gynecologic exam with Dr. Hackett.    On physical exam, your height is 5 feet 5 inches,  weight 190 pounds with a body mass index 31.62.  This  is in the overweight range.  Your blood pressure  118/72, pulse 72, temperature 97.1.  Your general  appearance is well developed with no  distress.  Your  head and neck exam is normal.  Your heart has a regular  rate and rhythm with no abnormal sounds.  Your lungs  are clear throughout with a normal respiratory effort.   Your abdomen is soft with normal bowel sounds, no mass  or enlarged organs are noted.  Your extremities have  strong distal pulses with no swelling.    Your labs reveal a normal blood count.  Your kidney and  liver function is normal.  Your potassium is mildly  elevated, which I believe is due to trauma on the blood  draw as your kidney function is excellent.  Your  fasting glucose is 94, normal.  Total cholesterol 142,  HDL 50, LDL 80.6, and triglycerides 57.  This is an  excellent cholesterol panel with a 10-year vascular  risk of 0.6%, which is excellent and very low.  Your  vitamin D level is a little low at 24.  Hemoglobin A1c  4.9%, normal.  TSH 1.704, normal.    Your CT calcium score is excellent at 0, which shows  very low chance that you have any plaque buildup in  your heart arteries.    In summary, you appear to be in very good health.   Please continue your exercise to continue your good  vascular health.  Your vitamin D level is a little low.   Please start vitamin D3 1000 units daily.  You are up  to date on all vaccinations and preventive care needed  at this time.    It was a pleasure to see you again and perform your  executive physical.  If I can be of any further  assistance or if you have any other questions or  concerns, please do not hesitate to let me know.    Yours very truly,  Kirsten HART  dd: 05/16/2018 07:19:15 (CDT)  td: 05/16/2018 13:44:19 (CDT)  Doc ID   #[id number]  Job ID #[id number]    CC:

## 2018-05-14 DIAGNOSIS — Z00.00 ROUTINE GENERAL MEDICAL EXAMINATION AT A HEALTH CARE FACILITY: Primary | ICD-10-CM

## 2018-06-01 ENCOUNTER — HOSPITAL ENCOUNTER (OUTPATIENT)
Dept: RADIOLOGY | Facility: HOSPITAL | Age: 48
Discharge: HOME | End: 2018-06-01
Attending: INTERNAL MEDICINE

## 2018-06-01 VITALS — HEIGHT: 65 IN | BODY MASS INDEX: 31.65 KG/M2 | WEIGHT: 190 LBS

## 2018-06-01 DIAGNOSIS — Z00.00 ENCOUNTER FOR PREVENTIVE HEALTH EXAMINATION: ICD-10-CM

## 2018-06-01 PROCEDURE — 77067 SCR MAMMO BI INCL CAD: CPT | Mod: 26,,, | Performed by: RADIOLOGY

## 2018-06-01 PROCEDURE — 77067 SCR MAMMO BI INCL CAD: CPT | Mod: TC,PO

## 2019-04-05 ENCOUNTER — HOSPITAL ENCOUNTER (OUTPATIENT)
Dept: RADIOLOGY | Facility: HOSPITAL | Age: 49
Discharge: HOME | End: 2019-04-05
Attending: NURSE PRACTITIONER
Payer: COMMERCIAL

## 2019-04-05 ENCOUNTER — OFFICE VISIT (OUTPATIENT)
Dept: INTERNAL MEDICINE | Facility: CLINIC | Age: 49
End: 2019-04-05
Payer: COMMERCIAL

## 2019-04-05 VITALS
OXYGEN SATURATION: 100 % | HEART RATE: 100 BPM | HEIGHT: 65 IN | WEIGHT: 188.25 LBS | BODY MASS INDEX: 31.36 KG/M2 | DIASTOLIC BLOOD PRESSURE: 74 MMHG | SYSTOLIC BLOOD PRESSURE: 138 MMHG | TEMPERATURE: 98 F

## 2019-04-05 DIAGNOSIS — J20.9 ACUTE BRONCHITIS, UNSPECIFIED ORGANISM: Primary | ICD-10-CM

## 2019-04-05 DIAGNOSIS — J01.80 OTHER ACUTE SINUSITIS, RECURRENCE NOT SPECIFIED: ICD-10-CM

## 2019-04-05 DIAGNOSIS — J20.9 ACUTE BRONCHITIS, UNSPECIFIED ORGANISM: ICD-10-CM

## 2019-04-05 PROCEDURE — 99214 PR OFFICE/OUTPT VISIT, EST, LEVL IV, 30-39 MIN: ICD-10-PCS | Mod: S$GLB,,, | Performed by: NURSE PRACTITIONER

## 2019-04-05 PROCEDURE — 71046 X-RAY EXAM CHEST 2 VIEWS: CPT | Mod: TC

## 2019-04-05 PROCEDURE — 99999 PR PBB SHADOW E&M-EST. PATIENT-LVL III: ICD-10-PCS | Mod: PBBFAC,,, | Performed by: NURSE PRACTITIONER

## 2019-04-05 PROCEDURE — 71046 XR CHEST PA AND LATERAL: ICD-10-PCS | Mod: 26,,, | Performed by: RADIOLOGY

## 2019-04-05 PROCEDURE — 99214 OFFICE O/P EST MOD 30 MIN: CPT | Mod: S$GLB,,, | Performed by: NURSE PRACTITIONER

## 2019-04-05 PROCEDURE — 99999 PR PBB SHADOW E&M-EST. PATIENT-LVL III: CPT | Mod: PBBFAC,,, | Performed by: NURSE PRACTITIONER

## 2019-04-05 PROCEDURE — 71046 X-RAY EXAM CHEST 2 VIEWS: CPT | Mod: 26,,, | Performed by: RADIOLOGY

## 2019-04-05 RX ORDER — PROMETHAZINE HYDROCHLORIDE AND DEXTROMETHORPHAN HYDROBROMIDE 6.25; 15 MG/5ML; MG/5ML
5 SYRUP ORAL NIGHTLY PRN
Qty: 180 ML | Refills: 0 | Status: SHIPPED | OUTPATIENT
Start: 2019-04-05 | End: 2019-04-15

## 2019-04-05 RX ORDER — LIOTHYRONINE SODIUM 5 UG/1
TABLET ORAL
COMMUNITY

## 2019-04-05 RX ORDER — HYDROCHLOROTHIAZIDE 25 MG/1
TABLET ORAL
COMMUNITY

## 2019-04-05 RX ORDER — METHYLPREDNISOLONE 4 MG/1
TABLET ORAL
Qty: 1 PACKAGE | Refills: 0 | Status: SHIPPED | OUTPATIENT
Start: 2019-04-05 | End: 2019-04-26

## 2019-04-05 RX ORDER — LEVOTHYROXINE SODIUM 150 UG/1
TABLET ORAL
COMMUNITY

## 2019-04-05 RX ORDER — PHENTERMINE HYDROCHLORIDE 37.5 MG/1
TABLET ORAL
COMMUNITY
End: 2020-11-11

## 2019-04-05 RX ORDER — DOXYCYCLINE 100 MG/1
100 CAPSULE ORAL 2 TIMES DAILY
Qty: 20 CAPSULE | Refills: 0 | Status: SHIPPED | OUTPATIENT
Start: 2019-04-05 | End: 2019-04-15

## 2019-04-05 NOTE — PROGRESS NOTES
Subjective:       Patient ID: Bing Mclaughlin is a 48 y.o. female.    Chief Complaint: Cough    Cough   This is a new problem. The current episode started 1 to 4 weeks ago. The problem has been gradually worsening. The problem occurs constantly. The cough is non-productive. Associated symptoms include ear congestion, nasal congestion, postnasal drip, rhinorrhea and wheezing. Pertinent negatives include no chest pain, chills, ear pain, fever, headaches, heartburn, hemoptysis, myalgias, rash, sore throat, shortness of breath, sweats or weight loss. She has tried a beta-agonist inhaler, rest, body position changes and OTC cough suppressant for the symptoms. The treatment provided mild relief. Her past medical history is significant for bronchitis, environmental allergies and pneumonia. There is no history of asthma, bronchiectasis, COPD or emphysema.           Past Medical History:   Diagnosis Date    Hypothyroid     Migraines      Past Surgical History:   Procedure Laterality Date    HYSTERECTOMY      OOPHORECTOMY      left ovary    ROBOT ASSISTED LAPAROSCOPIC OOPHERECTOMY Left 3/29/2017    [facility] at [facility]    SALPINGECTOMY-ROBOTIC ASSISTED Bilateral 3/29/2017    [facility]    TUBAL LIGATION      WRIST SURGERY      right, cyst removed.    XI ROBOTIC ASSISTED LAPAROSCOPIC HYSTERECTOMY N/A 3/29/2017    [facility]    XI ROBOTIC ASSISTED LAPAROSCOPIC LYSIS OF ADHESIONS N/A 3/29/2017    [facility] at [facility]     Social History     Socioeconomic History    Marital status:      Spouse name: Not on file    Number of children: Not on file    Years of education: Not on file    Highest education level: Not on file   Occupational History    Not on file   Social Needs    Financial resource strain: Not on file    Food insecurity:     Worry: Not on file     Inability: Not on file     Transportation needs:     Medical: Not on file     Non-medical: Not on file   Tobacco Use    Smoking status: Never Smoker    Smokeless tobacco: Never Used   Substance and Sexual Activity    Alcohol use: Yes     Alcohol/week: 0.0 oz     Comment: occ    Drug use: No    Sexual activity: Yes     Partners: Male     Birth control/protection: Surgical     Comment: tubal   Lifestyle    Physical activity:     Days per week: Not on file     Minutes per session: Not on file    Stress: Not on file   Relationships    Social connections:     Talks on phone: Not on file     Gets together: Not on file     Attends Confucianist service: Not on file     Active member of club or organization: Not on file     Attends meetings of clubs or organizations: Not on file     Relationship status: Not on file   Other Topics Concern    Not on file   Social History Narrative    Not on file     Review of patient's allergies indicates:  No Known Allergies  Current Outpatient Medications   Medication Sig    albuterol (PROAIR HFA) 90 mcg/actuation inhaler ProAir HFA 90 mcg/actuation aerosol inhaler   Inhale 2 puffs every 4 hours by inhalation route.    fexofenadine HCl (ALLEGRA ORAL) Take by mouth.    hydroCHLOROthiazide (HYDRODIURIL) 25 MG tablet hydrochlorothiazide 25 mg tablet   TAKE 1 TABLET BY MOUTH EVERY DAY IN THE MORNING    levothyroxine (SYNTHROID) 150 MCG tablet levothyroxine 150 mcg tablet   Take 1 tablet every day by oral route in the morning for 30 days.    liothyronine (CYTOMEL) 5 MCG Tab liothyronine 5 mcg tablet   Take 1 tablet twice a day by oral route for 30 days.    montelukast (SINGULAIR) 10 mg tablet Take 10 mg by mouth every evening.    phentermine (ADIPEX-P) 37.5 mg tablet phentermine 37.5 mg tablet   Take 1 tablet every day by oral route.    prasterone, dhea, (INTRAROSA) 6.5 mg Inst Intrarosa 6.5 mg vaginal insert   Insert 1 vaginal insert every day by vaginal route at bedtime for 30 days.    doxycycline  (VIBRAMYCIN) 100 MG Cap Take 1 capsule (100 mg total) by mouth 2 (two) times daily. for 10 days    methylPREDNISolone (MEDROL DOSEPACK) 4 mg tablet use as directed    promethazine-dextromethorphan (PROMETHAZINE-DM) 6.25-15 mg/5 mL Syrp Take 5 mLs by mouth nightly as needed.     No current facility-administered medications for this visit.            Review of Systems   Constitutional: Negative for activity change, appetite change, chills, diaphoresis, fatigue, fever, unexpected weight change and weight loss.   HENT: Positive for congestion, postnasal drip, rhinorrhea, sinus pressure and sinus pain. Negative for dental problem, drooling, ear discharge, ear pain, facial swelling, hearing loss, mouth sores, nosebleeds, sneezing, sore throat, tinnitus, trouble swallowing and voice change.    Eyes: Negative for photophobia, pain and visual disturbance.   Respiratory: Positive for cough, chest tightness and wheezing. Negative for hemoptysis, choking and shortness of breath.    Cardiovascular: Negative for chest pain, palpitations and leg swelling.   Gastrointestinal: Negative for abdominal distention, abdominal pain, constipation, diarrhea, heartburn, nausea and vomiting.   Genitourinary: Negative for decreased urine volume, difficulty urinating, dysuria, flank pain, frequency, hematuria and urgency.   Musculoskeletal: Negative for arthralgias, back pain, joint swelling, myalgias, neck pain and neck stiffness.   Skin: Negative for rash.   Allergic/Immunologic: Positive for environmental allergies. Negative for immunocompromised state.   Neurological: Negative for dizziness, tremors, seizures, syncope, facial asymmetry, speech difficulty, weakness, light-headedness, numbness and headaches.   Hematological: Negative for adenopathy. Does not bruise/bleed easily.   Psychiatric/Behavioral: Negative for confusion and sleep disturbance.       Objective:      Physical Exam   Constitutional: She is oriented to person, place, and  time. No distress.   HENT:   Head: Normocephalic and atraumatic.   Right Ear: Tympanic membrane is erythematous.   Left Ear: Tympanic membrane is erythematous.   Nose: Mucosal edema, rhinorrhea and sinus tenderness present.   Mouth/Throat: No oral lesions. No uvula swelling. Posterior oropharyngeal erythema present. No oropharyngeal exudate.   Beefy red appearance to nasal mucosa with purulent discharge. Pain noted when head is lowered.   Eyes: Pupils are equal, round, and reactive to light. Conjunctivae and EOM are normal.   Neck: Normal range of motion. Neck supple.   Cardiovascular: Normal rate, regular rhythm, normal heart sounds and intact distal pulses. Exam reveals no gallop and no friction rub.   No murmur heard.  Pulmonary/Chest: Effort normal and breath sounds normal. No respiratory distress. She has no wheezes. She has no rales.   Abdominal: Soft. Bowel sounds are normal.   Musculoskeletal: Normal range of motion.   Neurological: She is alert and oriented to person, place, and time.   Skin: Skin is warm and dry.       Assessment:     Vitals:    04/05/19 1614   BP: 138/74   Pulse: 100   Temp: 97.7 °F (36.5 °C)         1. Acute bronchitis, unspecified organism    2. Other acute sinusitis, recurrence not specified        Plan:   Acute bronchitis, unspecified organism  -     methylPREDNISolone (MEDROL DOSEPACK) 4 mg tablet; use as directed  Dispense: 1 Package; Refill: 0  -     X-Ray Chest PA And Lateral; Future; Expected date: 04/05/2019  -     promethazine-dextromethorphan (PROMETHAZINE-DM) 6.25-15 mg/5 mL Syrp; Take 5 mLs by mouth nightly as needed.  Dispense: 180 mL; Refill: 0    Other acute sinusitis, recurrence not specified  -     doxycycline (VIBRAMYCIN) 100 MG Cap; Take 1 capsule (100 mg total) by mouth 2 (two) times daily. for 10 days  Dispense: 20 capsule; Refill: 0  -     promethazine-dextromethorphan (PROMETHAZINE-DM) 6.25-15 mg/5 mL Syrp; Take 5 mLs by mouth nightly as needed.  Dispense: 180 mL;  Refill: 0          As above  Fluids  cxr with review to follow   Continue mucinex dm during the day  SE of all meds discussed  F/u with PCP if s/s worsen or fail to improve

## 2019-04-05 NOTE — PATIENT INSTRUCTIONS
Sinusitis (Antibiotic Treatment)    The sinuses are air-filled spaces within the bones of the face. They connect to the inside of the nose. Sinusitis is an inflammation of the tissue lining the sinus cavity. Sinus inflammation can occur during a cold. It can also be due to allergies to pollens and other particles in the air. Sinusitis can cause symptoms of sinus congestion and fullness. A sinus infection causes fever, headache and facial pain. There is often green or yellow drainage from the nose or into the back of the throat (post-nasal drip). You have been given antibiotics to treat this condition.  Home care:  · Take the full course of antibiotics as instructed. Do not stop taking them, even if you feel better.  · Drink plenty of water, hot tea, and other liquids. This may help thin mucus. It also may promote sinus drainage.  · Heat may help soothe painful areas of the face. Use a towel soaked in hot water. Or,  the shower and direct the hot spray onto your face. Using a vaporizer along with a menthol rub at night may also help.   · An expectorant containing guaifenesin may help thin the mucus and promote drainage from the sinuses.  · Over-the-counter decongestants may be used unless a similar medicine was prescribed. Nasal sprays work the fastest. Use one that contains phenylephrine or oxymetazoline. First blow the nose gently. Then use the spray. Do not use these medicines more often than directed on the label or symptoms may get worse. You may also use tablets containing pseudoephedrine. Avoid products that combine ingredients, because side effects may be increased. Read labels. You can also ask the pharmacist for help. (NOTE: Persons with high blood pressure should not use decongestants. They can raise blood pressure.)  · Over-the-counter antihistamines may help if allergies contributed to your sinusitis.    · Do not use nasal rinses or irrigation during an acute sinus infection, unless told to by  your health care provider. Rinsing may spread the infection to other sinuses.  · Use acetaminophen or ibuprofen to control pain, unless another pain medicine was prescribed. (If you have chronic liver or kidney disease or ever had a stomach ulcer, talk with your doctor before using these medicines. Aspirin should never be used in anyone under 18 years of age who is ill with a fever. It may cause severe liver damage.)  · Don't smoke. This can worsen symptoms.  Follow-up care  Follow up with your healthcare provider or our staff if you are not improving within the next week.  When to seek medical advice  Call your healthcare provider if any of these occur:  · Facial pain or headache becoming more severe  · Stiff neck  · Unusual drowsiness or confusion  · Swelling of the forehead or eyelids  · Vision problems, including blurred or double vision  · Fever of 100.4ºF (38ºC) or higher, or as directed by your healthcare provider  · Seizure  · Breathing problems  · Symptoms not resolving within 10 days  Date Last Reviewed: 4/13/2015  © 2816-7210 [employer]. [address] [place] [address]. All rights reserved. This information is not intended as a substitute for professional medical care. Always follow your healthcare professional's instructions.        Acute Bronchitis  Your healthcare provider has told you that you have acute bronchitis. Bronchitis is infection or inflammation of the bronchial tubes (airways in the lungs). Normally, air moves easily in and out of the airways. Bronchitis narrows the airways, making it harder for air to flow in and out of the lungs. This causes symptoms such as shortness of breath, coughing up yellow or green mucus, and wheezing. Bronchitis can be acute or chronic. Acute means the condition comes on quickly and goes away in a short time, usually within 3 to 10 days. Chronic means a condition lasts a long time and often comes back.    What causes acute  bronchitis?  Acute bronchitis almost always starts as a viral respiratory infection, such as a cold or the flu. Certain factors make it more likely for a cold or flu to turn into bronchitis. These include being very young, being elderly, having a heart or lung problem, or having a weak immune system. Cigarette smoking also makes bronchitis more likely.  When bronchitis develops, the airways become swollen. The airways may also become infected with bacteria. This is known as a secondary infection.  Diagnosing acute bronchitis  Your healthcare provider will examine you and ask about your symptoms and health history. You may also have a sputum culture to test the fluid in your lungs. Chest X-rays may be done to look for infection in the lungs.  Treating acute bronchitis  Bronchitis usually clears up as the cold or flu goes away. You can help feel better faster by doing the following:  · Take medicine as directed. You may be told to take ibuprofen or other over-the-counter medicines. These help relieve inflammation in your bronchial tubes. Your healthcare provider may prescribe an inhaler to help open up the bronchial tubes. Most of the time, acute bronchitis is caused by a viral infection. Antibiotics are usually not prescribed for viral infections.  · Drink plenty of fluids, such as water, juice, or warm soup. Fluids loosen mucus so that you can cough it up. This helps you breathe more easily. Fluids also prevent dehydration.  · Make sure you get plenty of rest.  · Do not smoke. Do not allow anyone else to smoke in your home.  Recovery and follow-up  Follow up with your doctor as you are told. You will likely feel better in a week or two. But a dry cough can linger beyond that time. Let your doctor know if you still have symptoms (other than a dry cough) after 2 weeks, or if youre prone to getting bronchial infections. Take steps to protect yourself from future infections. These steps include stopping smoking and  avoiding tobacco smoke, washing your hands often, and getting a yearly flu shot.  When to call your healthcare provider  Call the healthcare provider if you have any of the following:  · Fever of 100.4°F (38.0°C) or higher, or as advised  · Symptoms that get worse, or new symptoms  · Trouble breathing  · Symptoms that dont start to improve within a week, or within 3 days of taking antibiotics   Date Last Reviewed: 12/1/2016  © 9524-7976 [employer]. [address] [place] [address], [place] [address]. All rights reserved. This information is not intended as a substitute for professional medical care. Always follow your healthcare professional's instructions.

## 2019-04-11 ENCOUNTER — NURSE TRIAGE (OUTPATIENT)
Dept: ADMINISTRATIVE | Facility: CLINIC | Age: 49
End: 2019-04-11

## 2019-04-11 NOTE — TELEPHONE ENCOUNTER
Reason for Disposition   [1] Caller requesting NON-URGENT health information AND [2] PCP's office is the best resource    Protocols used: INFORMATION ONLY CALL-A-AH    Pt states she took doxycyline and few minutes later started coughing and powder from medication came out of pt nose. Pt now has sore throat, nasal burning, a lot of mucous and headache. Denies feeling of pill being stuck in throat or any trouble breathing. Advised pt to drink plenty of fluids. please call with any further care advice, thanks.

## 2019-04-11 NOTE — TELEPHONE ENCOUNTER
Please tell pt hopefully that will not happen again.  Try the doxycycline once more today with glass of water.  I will change abic if any problems.  SM

## 2019-07-16 ENCOUNTER — OFFICE VISIT (OUTPATIENT)
Dept: FAMILY MEDICINE | Facility: CLINIC | Age: 49
End: 2019-07-16
Payer: COMMERCIAL

## 2019-07-16 ENCOUNTER — HOSPITAL ENCOUNTER (OUTPATIENT)
Dept: RADIOLOGY | Facility: HOSPITAL | Age: 49
Discharge: HOME | End: 2019-07-16
Attending: INTERNAL MEDICINE
Payer: COMMERCIAL

## 2019-07-16 VITALS
WEIGHT: 184.5 LBS | HEIGHT: 65 IN | TEMPERATURE: 98 F | DIASTOLIC BLOOD PRESSURE: 88 MMHG | SYSTOLIC BLOOD PRESSURE: 128 MMHG | HEART RATE: 110 BPM | BODY MASS INDEX: 30.74 KG/M2 | OXYGEN SATURATION: 98 %

## 2019-07-16 DIAGNOSIS — R10.32 LLQ PAIN: Primary | ICD-10-CM

## 2019-07-16 DIAGNOSIS — R10.32 LLQ PAIN: ICD-10-CM

## 2019-07-16 PROCEDURE — 99999 PR PBB SHADOW E&M-EST. PATIENT-LVL IV: CPT | Mod: PBBFAC,,, | Performed by: INTERNAL MEDICINE

## 2019-07-16 PROCEDURE — 25500020 PHARM REV CODE 255: Performed by: INTERNAL MEDICINE

## 2019-07-16 PROCEDURE — 99999 PR PBB SHADOW E&M-EST. PATIENT-LVL IV: ICD-10-PCS | Mod: PBBFAC,,, | Performed by: INTERNAL MEDICINE

## 2019-07-16 PROCEDURE — 74178 CT ABDOMEN PELVIS W WO CONTRAST: ICD-10-PCS | Mod: 26,,, | Performed by: RADIOLOGY

## 2019-07-16 PROCEDURE — 74178 CT ABD&PLV WO CNTR FLWD CNTR: CPT | Mod: 26,,, | Performed by: RADIOLOGY

## 2019-07-16 PROCEDURE — 99214 PR OFFICE/OUTPT VISIT, EST, LEVL IV, 30-39 MIN: ICD-10-PCS | Mod: S$GLB,,, | Performed by: INTERNAL MEDICINE

## 2019-07-16 PROCEDURE — 99214 OFFICE O/P EST MOD 30 MIN: CPT | Mod: S$GLB,,, | Performed by: INTERNAL MEDICINE

## 2019-07-16 PROCEDURE — 74178 CT ABD&PLV WO CNTR FLWD CNTR: CPT | Mod: TC

## 2019-07-16 RX ADMIN — IOHEXOL 100 ML: 350 INJECTION, SOLUTION INTRAVENOUS at 01:07

## 2019-07-16 RX ADMIN — IOHEXOL 30 ML: 350 INJECTION, SOLUTION INTRAVENOUS at 11:07

## 2019-07-16 NOTE — PROGRESS NOTES
"Subjective:      Patient ID: Bing Mclaughlin is a 48 y.o. female.    Chief Complaint: Abdominal Pain      HPI  Here for follow up of medical problems and 2 days ago started with left pelvic area cramping.  Had painful normal bowel movement.  No black or blood in stool.  Profuse sweating, then passed out.  Ongoing persistent LLQ pain.  6/10, with occasional sharp pains at the site.  No n/v.  Normal BM last night.  Eating less becaused worried about the pain.  2 years ago partial hys, left ovary removal.    Updated/ last [employer] [facility] 5/18:  HM:  9/17 fluvax, 9/17 [id number], 1/17 TDaP, 1/16 MMG, 4/16 Gyn Dr. Hackett.     Review of Systems   Constitutional: Negative for chills, diaphoresis and fever.   Respiratory: Negative for cough and shortness of breath.    Cardiovascular: Negative for chest pain, palpitations and leg swelling.   Gastrointestinal: Negative for blood in stool, constipation, diarrhea, nausea and vomiting.   Genitourinary: Negative for dysuria, frequency and hematuria.   Musculoskeletal: Positive for myalgias. Negative for arthralgias.   Neurological: Positive for headaches.   Psychiatric/Behavioral: The patient is not nervous/anxious.          Objective:   /88 (BP Location: Right arm, Patient Position: Sitting, BP Method: Medium (Manual))   Pulse 110   Temp 98.3 °F (36.8 °C) (Oral)   Ht 5' 5" (1.651 m)   Wt 83.7 kg (184 lb 8.4 oz)   LMP 02/25/2017   SpO2 98%   BMI 30.71 kg/m²     Physical Exam   Constitutional: She is oriented to person, place, and time. She appears well-developed.   HENT:   Mouth/Throat: Oropharynx is clear and moist.   Neck: Neck supple. Carotid bruit is not present. No thyroid mass present.   Cardiovascular: Normal rate, regular rhythm and intact distal pulses. Exam reveals no gallop and no friction rub.   No murmur heard.  Pulmonary/Chest: Effort normal and breath sounds normal. She has no wheezes. She has no rales.   Abdominal: Soft. Bowel sounds are normal. She " exhibits no mass. There is no hepatosplenomegaly. There is tenderness (LLQ).   Musculoskeletal: She exhibits no edema.   Lymphadenopathy:     She has no cervical adenopathy.   Neurological: She is alert and oriented to person, place, and time.   Psychiatric: She has a normal mood and affect.           Assessment:       1. LLQ pain          Plan:     LLQ pain  -     CBC auto differential; Future; Expected date: 07/16/2019  -     Comprehensive metabolic panel; Future; Expected date: 07/16/2019  -     CT Abdomen Pelvis W Wo Contrast; Future; Expected date: 07/16/2019

## 2020-01-21 DIAGNOSIS — Z00.00 ROUTINE GENERAL MEDICAL EXAMINATION AT A HEALTH CARE FACILITY: Primary | ICD-10-CM

## 2020-03-06 ENCOUNTER — PATIENT OUTREACH (OUTPATIENT)
Dept: ADMINISTRATIVE | Facility: HOSPITAL | Age: 50
End: 2020-03-06

## 2020-03-06 DIAGNOSIS — Z12.11 ENCOUNTER FOR SCREENING COLONOSCOPY: Primary | ICD-10-CM

## 2020-03-06 NOTE — PROGRESS NOTES
PreVisit Chart Audit Perfomed   Colon Screening           Laisha MORENO LPN Care Coordinator  Care Coordination Department  [facility]  [phone]

## 2020-03-09 ENCOUNTER — TELEPHONE (OUTPATIENT)
Dept: ENDOSCOPY | Facility: HOSPITAL | Age: 50
End: 2020-03-09

## 2020-03-09 RX ORDER — SODIUM, POTASSIUM,MAG SULFATES 17.5-3.13G
1 SOLUTION, RECONSTITUTED, ORAL ORAL DAILY
Qty: 1 KIT | Refills: 0 | Status: SHIPPED | OUTPATIENT
Start: 2020-03-09 | End: 2020-03-11

## 2020-03-09 NOTE — TELEPHONE ENCOUNTER
"1. Have you been admitted overnight to the hospital in the past 3 months? no   If yes, schedule an appointment with PCP before scheduling endoscopic procedure.     2. Have you had a stent placed in the last 12 months? no   If yes, for a screening visit, cancel and message the ordering provider.  The patient will need a new order when the time is appropriate.     3. Have you had a stroke or heart attack in the past 6 months? no   If yes, cancel and refer patient to ordering provider for clearance, also message ordering provider to inform.     4. Have you had any chest pain in the past 3 months? no   If yes, Have you been evaluated by your PCP and/or cardiologist and it was determined to not be heart related? not applicable   If No, Pt needs to be seen by PCP or Cardiologist .  Pt can be scheduled once clearance obtained by either of those providers.     5. Do you take prescription weight loss medications?  no   If yes, must stop for 2 weeks prior to procedure.     6. Have you been diagnosed with diverticulitis within the past 3 months? no   If yes, must have been seen by GI within the last 3 months, if not schedule with GI [facility].    If pt has been seen by GI, schedule procedure 8-12 weeks post antibiotic treatment.     7. Are you on Dialysis? no  If yes, schedule procedure for the day AFTER dialysis.  Appt time should be 9am or later, patient arrival time is 2 hours prior.  Nulytely or miralax prep for all patients with kidney disease.     8. Are you diabetic?  no   If yes, schedule morning appt. Advise pt to hold all diabetic meds day of procedure.     9. If pt is older than 80 years of age and HAS NOT been seen by GI or PCP within the last 6 months, needs appt with GI [facility].   If pt has been seen by the GI provider or PCP within the past 6  months AND meets criteria, schedule procedure AND send message to the endoscopist.     10. Is patient on a "high risk" medication (blood thinner/antiplatelet agent)?  no   If yes, " has cardiac clearance been obtained within the last 60 days? N/A   If no, a new clearance needs to be obtained.       I have reviewed the last colonoscopy for recommendations regarding next procedure bowel prep.  no  I have reviewed medications and allergies.  yes  I have verified the pharmacy information and appropriate prep sent if needed. yes  Prep instructions have been mailed or sent to portal per patient request. yes    If answers yes to any of the following, schedule at [facility] ONLY. If No, OK for either location.     Is BMI over 45?   Any complaints of chest pain, new onset or at rest?  Does pt have an AICD?  Is there a diagnosis of heart failure?  Is patient on dialysis?  Does patient have an insulin pump?  If procedure for esophageal banding?

## 2020-04-02 ENCOUNTER — TELEPHONE (OUTPATIENT)
Dept: ENDOSCOPY | Facility: HOSPITAL | Age: 50
End: 2020-04-02

## 2020-04-02 NOTE — TELEPHONE ENCOUNTER
Spoke with patient regarding cancellation of procedure with Dr. Ridley on 4/17/2020 due to Covid-19. Patient verbalized understanding of the rescheduling process.

## 2020-05-14 ENCOUNTER — TELEPHONE (OUTPATIENT)
Dept: ENDOSCOPY | Facility: HOSPITAL | Age: 50
End: 2020-05-14

## 2020-05-14 DIAGNOSIS — Z12.11 SCREENING FOR COLON CANCER: Primary | ICD-10-CM

## 2020-05-14 NOTE — TELEPHONE ENCOUNTER
COVID Screening     1. Have you had a fever in the last 7 days or have you used fever reducing medicines for a fever in the last 7 days?  no    2. Are you experiencing shortness of breath, cough, muscle aches, loss of taste or loss of smell?  no    3. Are you residing with anyone who has tested positive for Covid?  no    If answered yes to any of the above questions, the pt must be scheduled for an appointment with their PCP.    A message also needs to be sent to the endoscopist to ensure the patient gets rescheduled at a later date.     ENDO screening    1. Have you been admitted overnight to the hospital in the past 3 months? no   If yes, schedule an appointment with PCP before scheduling endoscopic procedure.     2. Have you had a stent placed in the last 12 months? no   If yes, for a screening visit, cancel and message the ordering provider.  The patient will need a new order when the time is appropriate.     3. Have you had a stroke or heart attack in the past 6 months? no   If yes, cancel and refer patient to ordering provider for clearance, also message ordering provider to inform.     4. Have you had any chest pain in the past 3 months? no   If yes, Have you been evaluated by your PCP and/or cardiologist and it was determined to not be heart related? not applicable   If No, Pt needs to be seen by PCP or Cardiologist .  Pt can be scheduled once clearance obtained by either of those providers.     5. Do you take prescription weight loss medications?  no   If yes, must stop for 2 weeks prior to procedure.     6. Have you been diagnosed with diverticulitis within the past 3 months? no   If yes, must have been seen by GI within the last 3 months, if not schedule with GI [facility].    If pt has been seen by GI, schedule procedure 8-12 weeks post antibiotic treatment.     7. Are you on Dialysis? no  If yes, schedule procedure for the day AFTER dialysis.  Appt time should be 9am or later, patient arrival time is 2 hours  "prior.  Nulytely or    miralax prep for all patients with kidney disease.     8. Are you diabetic?  no   If yes, schedule morning appt. Advise pt to hold all diabetic meds day of procedure.     9. If pt is older than 80 years of age and HAS NOT been seen by GI or PCP within the last 6 months, needs appt with GI [facility].   If pt has been seen by the GI provider or PCP within the past 6  months AND meets criteria, schedule procedure AND send message to the endoscopist.     10. Is patient on a "high risk" medication (blood thinner/antiplatelet agent)?  no   If yes, has cardiac clearance been obtained within the last 60 days? N/A   If no, a new clearance needs to be obtained.       I have reviewed the last colonoscopy for recommendations regarding next procedure bowel prep.  no  I have reviewed medications and allergies.  yes  I have verified the pharmacy information and appropriate prep sent if needed. no  Prep instructions have been mailed or sent to portal per patient request. yes    If answers yes to any of the following, schedule at [facility] ONLY. If No, OK for either location.     Is BMI over 45?   Any complaints of chest pain, new onset or at rest?  Does pt have an AICD?  Is there a diagnosis of heart failure?  Does patient have an insulin pump?  If procedure for esophageal banding?      "

## 2020-06-01 ENCOUNTER — TELEPHONE (OUTPATIENT)
Dept: GASTROENTEROLOGY | Facility: CLINIC | Age: 50
End: 2020-06-01

## 2020-06-01 NOTE — TELEPHONE ENCOUNTER
All questions/concerns answered.  EARL Carranza    ----- Message from Karon Rehman sent at 6/1/2020 11:09 AM CDT -----  Contact: pt  Pt has a covid test on Wed. Pt asking if it's a blood test or nasal. Please advise.

## 2020-06-03 ENCOUNTER — LAB VISIT (OUTPATIENT)
Dept: OTOLARYNGOLOGY | Facility: CLINIC | Age: 50
End: 2020-06-03
Payer: COMMERCIAL

## 2020-06-03 DIAGNOSIS — Z12.11 SCREENING FOR COLON CANCER: ICD-10-CM

## 2020-06-03 PROCEDURE — U0003 INFECTIOUS AGENT DETECTION BY NUCLEIC ACID (DNA OR RNA); SEVERE ACUTE RESPIRATORY SYNDROME CORONAVIRUS 2 (SARS-COV-2) (CORONAVIRUS DISEASE [COVID-19]), AMPLIFIED PROBE TECHNIQUE, MAKING USE OF HIGH THROUGHPUT TECHNOLOGIES AS DESCRIBED BY CMS-2020-01-R: HCPCS

## 2020-06-03 NOTE — PRE-PROCEDURE INSTRUCTIONS
PAT call completed and patient educated on the bowel prep, clear liquid diet and procedure instructions. Medical history discussed and patient informed of arrival times on 6/5/2020 at 0830 and 2nd prep dose 0500. Pt will be accompanied by her  [facility] [phone] and is made aware of limited-visitor policy, and that  is to remain during entire visit. All questions and concerns addressed. Bowel prep ordered to patient's verified pharmacy and copy of instructions sent via portal. Informed to take AM medications of HCTZ. Pt states she stopped taking Phentermine two weeks ago (5/21/2020). NPO after 2nd bowel prep. Patient verbalizes understanding of teaching and all instructions. Pre-procedure Covid testing 6/3/2020 at the [facility] Patient aware.

## 2020-06-04 ENCOUNTER — ANESTHESIA EVENT (OUTPATIENT)
Dept: ENDOSCOPY | Facility: HOSPITAL | Age: 50
End: 2020-06-04
Payer: COMMERCIAL

## 2020-06-04 PROBLEM — Z12.11 ENCOUNTER FOR SCREENING COLONOSCOPY: Status: ACTIVE | Noted: 2020-06-04

## 2020-06-04 LAB — SARS-COV-2 RNA RESP QL NAA+PROBE: NOT DETECTED

## 2020-06-04 NOTE — ANESTHESIA PREPROCEDURE EVALUATION
06/04/2020  Bing Mclaughlin is a 49 y.o., female.    Anesthesia Evaluation    I have reviewed the Patient Summary Reports.    I have reviewed the Nursing Notes. I have reviewed the NPO Status.   I have reviewed the Medications.     Review of Systems  Anesthesia Hx:  No problems with previous Anesthesia  Denies Family Hx of Anesthesia complications.   Denies Personal Hx of Anesthesia complications.   Social:  No Alcohol Use, Non-Smoker    Hematology/Oncology:  Hematology Normal        Cardiovascular:  Cardiovascular Normal  ECG has been reviewed.    Pulmonary:  Pulmonary Normal    Renal/:  Renal/ Normal     Hepatic/GI:  Hepatic/GI Normal    Neurological:   Headaches    Endocrine:   Hypothyroidism    Psych:  Psychiatric Normal           Physical Exam  General:  Obesity    Airway/Jaw/Neck:  Airway Findings: Mouth Opening: Normal Tongue: Normal  General Airway Assessment: Adult  Mallampati: II  TM Distance: Normal, at least 6 cm  Jaw/Neck Findings:  Neck ROM: Normal ROM  Neck Findings:     Eyes/Ears/Nose:  Eyes/Ears/Nose Findings:    Dental:  Dental Findings: In tact   Chest/Lungs:  Chest/Lungs Findings: Clear to auscultation, Normal Respiratory Rate     Heart/Vascular:  Heart Findings: Rate: Normal  Rhythm: Regular Rhythm  Sounds: Normal  Heart murmur: negative Vascular Findings: Normal    Abdomen:  Abdomen Findings: Normal    Musculoskeletal:  Musculoskeletal Findings: Normal   Skin:  Skin Findings: Normal    Mental Status:  Mental Status Findings:  Alert and Oriented         Anesthesia Plan  Type of Anesthesia, risks & benefits discussed:  Anesthesia Type:  general  Patient's Preference:   Intra-op Monitoring Plan: standard ASA monitors  Intra-op Monitoring Plan Comments:   Post Op Pain Control Plan: per primary service following discharge from PACU  Post Op Pain Control Plan Comments:   Induction:    IV  Beta Blocker:  Patient is not currently on a Beta-Blocker (No further documentation required).       Informed Consent: Patient understands risks and agrees with Anesthesia plan.  Questions answered. Anesthesia consent signed with patient.  ASA Score: 2     Day of Surgery Review of History & Physical:    H&P update referred to the surgeon.         Ready For Surgery From Anesthesia Perspective.

## 2020-06-05 ENCOUNTER — HOSPITAL ENCOUNTER (OUTPATIENT)
Facility: HOSPITAL | Age: 50
Discharge: HOME | End: 2020-06-05
Attending: INTERNAL MEDICINE | Admitting: INTERNAL MEDICINE
Payer: COMMERCIAL

## 2020-06-05 ENCOUNTER — ANESTHESIA (OUTPATIENT)
Dept: ENDOSCOPY | Facility: HOSPITAL | Age: 50
End: 2020-06-05
Payer: COMMERCIAL

## 2020-06-05 VITALS
DIASTOLIC BLOOD PRESSURE: 81 MMHG | HEIGHT: 65 IN | RESPIRATION RATE: 16 BRPM | WEIGHT: 186.06 LBS | TEMPERATURE: 98 F | HEART RATE: 65 BPM | BODY MASS INDEX: 31 KG/M2 | OXYGEN SATURATION: 100 % | SYSTOLIC BLOOD PRESSURE: 133 MMHG

## 2020-06-05 DIAGNOSIS — Z12.11 ENCOUNTER FOR SCREENING COLONOSCOPY: Primary | ICD-10-CM

## 2020-06-05 PROCEDURE — 63600175 PHARM REV CODE 636 W HCPCS: Performed by: ANESTHESIOLOGY

## 2020-06-05 PROCEDURE — 63600175 PHARM REV CODE 636 W HCPCS: Performed by: NURSE ANESTHETIST, CERTIFIED REGISTERED

## 2020-06-05 PROCEDURE — D9220A PRA ANESTHESIA: ICD-10-PCS | Mod: ANES,,, | Performed by: ANESTHESIOLOGY

## 2020-06-05 PROCEDURE — 37000009 HC ANESTHESIA EA ADD 15 MINS: Performed by: INTERNAL MEDICINE

## 2020-06-05 PROCEDURE — D9220A PRA ANESTHESIA: Mod: CRNA,,, | Performed by: NURSE ANESTHETIST, CERTIFIED REGISTERED

## 2020-06-05 PROCEDURE — G0121 COLON CA SCRN NOT HI RSK IND: ICD-10-PCS | Mod: ,,, | Performed by: INTERNAL MEDICINE

## 2020-06-05 PROCEDURE — D9220A PRA ANESTHESIA: Mod: ANES,,, | Performed by: ANESTHESIOLOGY

## 2020-06-05 PROCEDURE — D9220A PRA ANESTHESIA: ICD-10-PCS | Mod: CRNA,,, | Performed by: NURSE ANESTHETIST, CERTIFIED REGISTERED

## 2020-06-05 PROCEDURE — 63600175 PHARM REV CODE 636 W HCPCS

## 2020-06-05 PROCEDURE — G0121 COLON CA SCRN NOT HI RSK IND: HCPCS | Performed by: INTERNAL MEDICINE

## 2020-06-05 PROCEDURE — 37000008 HC ANESTHESIA 1ST 15 MINUTES: Performed by: INTERNAL MEDICINE

## 2020-06-05 PROCEDURE — 45378 DIAGNOSTIC COLONOSCOPY: CPT | Performed by: INTERNAL MEDICINE

## 2020-06-05 PROCEDURE — G0121 COLON CA SCRN NOT HI RSK IND: HCPCS | Mod: ,,, | Performed by: INTERNAL MEDICINE

## 2020-06-05 RX ORDER — LIDOCAINE HYDROCHLORIDE 10 MG/ML
1 INJECTION, SOLUTION EPIDURAL; INFILTRATION; INTRACAUDAL; PERINEURAL ONCE
Status: DISCONTINUED | OUTPATIENT
Start: 2020-06-05 | End: 2020-06-05 | Stop reason: HOSPADM

## 2020-06-05 RX ORDER — SODIUM CHLORIDE 0.9 % (FLUSH) 0.9 %
10 SYRINGE (ML) INJECTION
Status: DISCONTINUED | OUTPATIENT
Start: 2020-06-05 | End: 2020-06-05 | Stop reason: HOSPADM

## 2020-06-05 RX ORDER — PROPOFOL 10 MG/ML
VIAL (ML) INTRAVENOUS
Status: DISCONTINUED | OUTPATIENT
Start: 2020-06-05 | End: 2020-06-05

## 2020-06-05 RX ORDER — LIDOCAINE HCL/PF 100 MG/5ML
SYRINGE (ML) INTRAVENOUS
Status: DISCONTINUED | OUTPATIENT
Start: 2020-06-05 | End: 2020-06-05

## 2020-06-05 RX ORDER — ONDANSETRON 2 MG/ML
4 INJECTION INTRAMUSCULAR; INTRAVENOUS DAILY PRN
Status: DISCONTINUED | OUTPATIENT
Start: 2020-06-05 | End: 2020-06-05 | Stop reason: HOSPADM

## 2020-06-05 RX ORDER — SODIUM CHLORIDE, SODIUM LACTATE, POTASSIUM CHLORIDE, CALCIUM CHLORIDE 600; 310; 30; 20 MG/100ML; MG/100ML; MG/100ML; MG/100ML
INJECTION, SOLUTION INTRAVENOUS CONTINUOUS
Status: DISCONTINUED | OUTPATIENT
Start: 2020-06-05 | End: 2020-06-05 | Stop reason: HOSPADM

## 2020-06-05 RX ADMIN — PROPOFOL 50 MG: 10 INJECTION, EMULSION INTRAVENOUS at 09:06

## 2020-06-05 RX ADMIN — PROPOFOL 100 MG: 10 INJECTION, EMULSION INTRAVENOUS at 09:06

## 2020-06-05 RX ADMIN — Medication 80 MG: at 08:06

## 2020-06-05 RX ADMIN — SODIUM CHLORIDE, SODIUM LACTATE, POTASSIUM CHLORIDE, AND CALCIUM CHLORIDE: .6; .31; .03; .02 INJECTION, SOLUTION INTRAVENOUS at 08:06

## 2020-06-05 NOTE — [employer] PATIENT INSTRUCTIONS
Discharge Summary/Instructions after an Endoscopic Procedure  Patient Name: Bing Mclaughlin  Patient MRN: [id number]  Patient YOB: 1970 Friday, June 5, 2020  Camille Ridley MD  RESTRICTIONS:  During your procedure today, you received medications for sedation.  These   medications may affect your judgment, balance and coordination.  Therefore,   for 24 hours, you have the following restrictions:   - DO NOT drive a car, operate machinery, make legal/financial decisions,   sign important papers or drink alcohol.    ACTIVITY:  Today: no heavy lifting, straining or running due to procedural   sedation/anesthesia.  The following day: return to full activity including work.  DIET:  Eat and drink normally unless instructed otherwise.     TREATMENT FOR COMMON SIDE EFFECTS:  - Mild abdominal pain, nausea, belching, bloating or excessive gas:  rest,   eat lightly and use a heating pad.  - Sore Throat: treat with throat lozenges and/or gargle with warm salt   water.  - Because air was used during the procedure, expelling large amounts of air   from your rectum or belching is normal.  - If a bowel prep was taken, you may not have a bowel movement for 1-3 days.    This is normal.  SYMPTOMS TO WATCH FOR AND REPORT TO YOUR PHYSICIAN:  1. Abdominal pain or bloating, other than gas cramps.  2. Chest pain.  3. Back pain.  4. Signs of infection such as: chills or fever occurring within 24 hours   after the procedure.  5. Rectal bleeding, which would show as bright red, maroon, or black stools.   (A tablespoon of blood from the rectum is not serious, especially if   hemorrhoids are present.)  6. Vomiting.  7. Weakness or dizziness.  GO DIRECTLY TO THE NEAREST EMERGENCY ROOM IF YOU HAVE ANY OF THE FOLLOWING:      Difficulty breathing              Chills and/or fever over 101 F   Persistent vomiting and/or vomiting blood   Severe abdominal pain   Severe chest pain   Black, tarry stools   Bleeding- more than one  tablespoon   Any other symptom or condition that you feel may need urgent attention  Your doctor recommends these additional instructions:  If any biopsies were taken, your doctors clinic will contact you in 1 to 2   weeks with any results.  - Discharge patient to home (via wheelchair).   - Resume previous diet.   - Continue present medications.   - Repeat colonoscopy in 10 years for screening purposes.   - Patient has a contact number available for emergencies.  The signs and   symptoms of potential delayed complications were discussed with the   patient.  Return to normal activities tomorrow.  Written discharge   instructions were provided to the patient.  For questions, problems or results please call your physician Camille Ridley MD at Work:  [phone]  If you have any questions about the above instructions, call the GI   department at [phone] or call the endoscopy unit at [phone]   from 7am until 3 pm.  [facility], EMERGENCY ROOM PHONE NUMBER:   [phone]  IF A COMPLICATION OR EMERGENCY SITUATION ARISES AND YOU ARE UNABLE TO REACH   YOUR PHYSICIAN - GO DIRECTLY TO THE EMERGENCY ROOM.  I have read or have had read to me these discharge instructions for my   procedure and have received a written copy.  I understand these   instructions and will follow-up with my physician if I have any questions.     __________________________________       _____________________________________  Nurse Signature                                          Patient/Designated   Responsible Party Signature  MD Camille Carrero MD  6/5/2020 9:21:09 AM  [employer]

## 2020-06-05 NOTE — DISCHARGE SUMMARY
Endoscopy Discharge Summary      Admit Date: 6/5/2020    Discharge Date and Time:  6/5/2020 9:22 AM    Attending Physician: Camille Ridley MD     Discharge Physician: Camille Ridley MD     Principal Admitting Diagnoses: Encounter for screening colonoscopy         Discharge Diagnosis: The encounter diagnosis was Encounter for screening colonoscopy.     Discharged Condition: Good    Indication for Admission: Encounter for screening colonoscopy     Hospital Course: Patient was admitted for an inpatient procedure and tolerated the procedure well with no complications.    Significant Diagnostic Studies: Colonoscopy    Pathology (if any):  Specimen (12h ago, onward)    None          Estimated Blood Loss: 0 ml.    Discussed with: patient.    Disposition: Home.    Follow Up/Patient Instructions:   Current Discharge Medication List      CONTINUE these medications which have NOT CHANGED    Details   fexofenadine HCl (ALLEGRA ORAL) Take by mouth.      hydroCHLOROthiazide (HYDRODIURIL) 25 MG tablet hydrochlorothiazide 25 mg tablet   TAKE 1 TABLET BY MOUTH EVERY DAY IN THE MORNING      levothyroxine (SYNTHROID) 150 MCG tablet levothyroxine 150 mcg tablet   Take 1 tablet every day by oral route in the morning for 30 days.      liothyronine (CYTOMEL) 5 MCG Tab liothyronine 5 mcg tablet   Take 1 tablet twice a day by oral route for 30 days.      phentermine (ADIPEX-P) 37.5 mg tablet phentermine 37.5 mg tablet   Take 1 tablet every day by oral route.      albuterol (PROAIR HFA) 90 mcg/actuation inhaler ProAir HFA 90 mcg/actuation aerosol inhaler   Inhale 2 puffs every 4 hours by inhalation route.      montelukast (SINGULAIR) 10 mg tablet Take 10 mg by mouth every evening.      prasterone, dhea, (INTRAROSA) 6.5 mg Inst Intrarosa 6.5 mg vaginal insert   Insert 1 vaginal insert every day by vaginal route at bedtime for 30 days.             No discharge procedures on file.        Camille Ridley MD  Gastroenterology and  Hepatology

## 2020-06-05 NOTE — ANESTHESIA POSTPROCEDURE EVALUATION
Anesthesia Post Evaluation    Patient: Bing Mclaughlin    Procedure(s) Performed: Procedure(s) (LRB):  COLONOSCOPY (N/A)    Final Anesthesia Type: general    Patient location during evaluation: PACU  Patient participation: Yes- Able to Participate  Level of consciousness: awake and alert and oriented  Post-procedure vital signs: reviewed and stable  Pain management: adequate  Airway patency: patent    PONV status at discharge: No PONV  Anesthetic complications: no      Cardiovascular status: blood pressure returned to baseline, stable and hemodynamically stable  Respiratory status: unassisted  Hydration status: euvolemic  Follow-up not needed.          Vitals Value Taken Time   /81 6/5/2020  9:50 AM   Temp 36.5 °C (97.7 °F) 6/5/2020  9:22 AM   Pulse 65 6/5/2020  9:50 AM   Resp 16 6/5/2020  9:50 AM   SpO2 100 % 6/5/2020  9:50 AM         Event Time     Out of Recovery 09:55:00          Pain/Clayton Score: Clayton Score: 8 (6/5/2020  9:22 AM)

## 2020-06-05 NOTE — TRANSFER OF CARE
"Anesthesia Transfer of Care Note    Patient: Bing Mclaughlin    Procedure(s) Performed: Procedure(s) (LRB):  COLONOSCOPY (N/A)    Patient location: PACU    Anesthesia Type: general    Transport from OR: Transported from OR on room air with adequate spontaneous ventilation    Post pain: adequate analgesia    Post assessment: no apparent anesthetic complications and tolerated procedure well    Post vital signs: stable    Level of consciousness: lethargic    Nausea/Vomiting: no nausea/vomiting    Complications: none    Transfer of care protocol was followed      Last vitals:   Visit Vitals  /74 (BP Location: Right arm, Patient Position: Sitting)   Pulse 86   Temp 36.6 °C (97.9 °F) (Temporal)   Resp 16   Ht 5' 5" (1.651 m)   Wt 84.4 kg (186 lb 1.1 oz)   LMP 02/25/2017   SpO2 95%   Breastfeeding? No   BMI 30.96 kg/m²     "

## 2020-06-05 NOTE — H&P
Short Stay Endoscopy History and Physical    PCP - Kirsten Ramirez MD    Procedure - Colonoscopy  ASA - 2  Mallampati - per anesthesia  History of Anesthesia problems - no  Family history Anesthesia problems -  no     HPI:  This is a 49 y.o. female here for evaluation of :   Active Hospital Problems    Diagnosis  POA    *Encounter for screening colonoscopy [Z12.11]  Not Applicable      Resolved Hospital Problems   No resolved problems to display.         Health Maintenance       Date Due Completion Date    Shingles Vaccine (1 of 2) 2019 ---    Colonoscopy 2019 ---    Mammogram 2020    Override on 2013: Done    Influenza Vaccine (Season Ended) 2020    Lipid Panel 2023    TETANUS VACCINE 2027 2/3/2017          Screening - yes  History of polyps - no  Diarrhea - no  Anemia - no  Blood in stools - no  Abdominal pain - no  Family History of Colon Cancer- no  Other - no    ROS:  CONSTITUTIONAL: Denies weight change,  fatigue, fevers, chills, night sweats.  CARDIOVASCULAR: Denies chest pain, shortness of breath, orthopnea and edema.  RESPIRATORY: Denies cough, hemoptysis, dyspnea, and wheezing.  GI: See HPI.    Medical History:   Past Medical History:   Diagnosis Date    Hypothyroid     Migraines        Surgical History:   Past Surgical History:   Procedure Laterality Date    HYSTERECTOMY      OOPHORECTOMY      left ovary    TUBAL LIGATION      WRIST SURGERY      right, cyst removed.       Family History:   Family History   Problem Relation Age of Onset    Uterine cancer Mother     Cancer Mother     Ovarian cancer Mother     Cancer Sister     Deep vein thrombosis Neg Hx      labor Neg Hx     Breast cancer Neg Hx        Social History:   Social History     Tobacco Use    Smoking status: Never Smoker    Smokeless tobacco: Never Used   Substance Use Topics    Alcohol use: Yes     Alcohol/week: 0.0 standard drinks     Frequency: 2-4  times a month     Drinks per session: 1 or 2     Binge frequency: Never     Comment: occ    Drug use: No       Allergies:   Review of patient's allergies indicates:  No Known Allergies    Medications:   No current facility-administered medications on file prior to encounter.      Current Outpatient Medications on File Prior to Encounter   Medication Sig Dispense Refill    albuterol (PROAIR HFA) 90 mcg/actuation inhaler ProAir HFA 90 mcg/actuation aerosol inhaler   Inhale 2 puffs every 4 hours by inhalation route.      hydroCHLOROthiazide (HYDRODIURIL) 25 MG tablet hydrochlorothiazide 25 mg tablet   TAKE 1 TABLET BY MOUTH EVERY DAY IN THE MORNING      phentermine (ADIPEX-P) 37.5 mg tablet phentermine 37.5 mg tablet   Take 1 tablet every day by oral route.      fexofenadine HCl (ALLEGRA ORAL) Take by mouth.      levothyroxine (SYNTHROID) 150 MCG tablet levothyroxine 150 mcg tablet   Take 1 tablet every day by oral route in the morning for 30 days.      liothyronine (CYTOMEL) 5 MCG Tab liothyronine 5 mcg tablet   Take 1 tablet twice a day by oral route for 30 days.      montelukast (SINGULAIR) 10 mg tablet Take 10 mg by mouth every evening.      prasterone, dhea, (INTRAROSA) 6.5 mg Inst Intrarosa 6.5 mg vaginal insert   Insert 1 vaginal insert every day by vaginal route at bedtime for 30 days.         Physical Exam:  Vital Signs: There were no vitals filed for this visit.  General Appearance: Well appearing in no acute distress  ENT: OP clear  Chest: CTA B  CV: RRR, no m/r/g  Abd: s/nt/nd/nabs  Ext: no edema    Labs:Reviewed    IMP:  Active Hospital Problems    Diagnosis  POA    *Encounter for screening colonoscopy [Z12.11]  Not Applicable      Resolved Hospital Problems   No resolved problems to display.         Plan:   I have explained the risks and benefits of colonoscopy to the patient including but not limited to bleeding, perforation, infection, and death. The patient wishes to proceed.

## 2020-06-05 NOTE — PLAN OF CARE
Patient d/c home in stable condition via wheelchair with ride. Verbalized understanding of d/c instructions. Patient voiced no complaints. Patient stood at side of bed, walked steps with no new motor deficits. Pt. And spouse both verbalize understanding of discharge instructions and care at home.

## 2020-07-02 NOTE — PROGRESS NOTES
Subjective:      Patient ID: Bing Mclaughlin is a 49 y.o. female.    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  Doing ok, working from home.  Had Cscope, normal.  Continues with left pelvic pain and left low back pain, sometimes severe, not really related to BMs.  1y ago CT abd/pelvic negative.  No f/c/sw/cough.  Bike riding 6mi, 5d a week.  No cp/sob/palp.  BMs and urine normal.  Takes vit D.  Good level with PMD, who has checked.  Allergies doing ok with meds.        Past Medical History:   Diagnosis Date    Allergic rhinitis     Hypothyroid     Migraines        Past Surgical History:   Procedure Laterality Date    COLONOSCOPY N/A 6/5/2020    Procedure: COLONOSCOPY;  Surgeon: Camille Ridley MD;  Location: [facility];  Service: Endoscopy;  Laterality: N/A;    HYSTERECTOMY      OOPHORECTOMY      left ovary    TUBAL LIGATION      WRIST SURGERY      right, cyst removed.       MEDICATIONS:  albuterol (PROAIR HFA) 90 mcg/actuation inhaler                fexofenadine HCl (ALLEGRA ORAL)               hydroCHLOROthiazide (HYDRODIURIL) 25 MG tablet               levothyroxine (SYNTHROID) 150 MCG tablet               liothyronine (CYTOMEL) 5 MCG Tab               montelukast (SINGULAIR) 10 mg tablet 10 mg, Nightly              phentermine (ADIPEX-P) 37.5 mg tablet                 NKDA.        HM:  9/17 fluvax, 7/20 today HAV, 9/17 [id number], 1/17 TDaP, 7/20 today MMG, 7/20 BMD rep 3-5y, 4/16 Gyn Dr. Hackett, 6/20 Cscope rep 10y.     Review of Systems   Constitutional: Negative for appetite change, chills, diaphoresis and fever.   HENT: Negative for congestion, ear pain, rhinorrhea, sinus pressure and sore throat.    Respiratory: Negative for cough, chest tightness and shortness of breath.    Cardiovascular: Negative for chest pain and palpitations.   Gastrointestinal: Negative for blood in stool, constipation, diarrhea, nausea and vomiting.  "  Genitourinary: Negative for dysuria, frequency, hematuria, menstrual problem, urgency and vaginal discharge.   Musculoskeletal: Negative for arthralgias.   Skin: Negative for rash.   Neurological: Negative for dizziness and headaches.   Psychiatric/Behavioral: Negative for sleep disturbance. The patient is not nervous/anxious.          Objective:   /74   Pulse 86   Temp 97.8 °F (36.6 °C)   Ht 5' 5.5" (1.664 m)   Wt 84 kg (185 lb 3 oz)   LMP 02/25/2017   BMI 30.35 kg/m²     Physical Exam  Constitutional:       Appearance: She is well-developed.   HENT:      Right Ear: External ear normal. Tympanic membrane is not injected.      Left Ear: External ear normal. Tympanic membrane is not injected.   Eyes:      Conjunctiva/sclera: Conjunctivae normal.   Neck:      Musculoskeletal: Normal range of motion and neck supple.      Thyroid: No thyromegaly.   Cardiovascular:      Rate and Rhythm: Normal rate and regular rhythm.      Heart sounds: No murmur. No friction rub. No gallop.    Pulmonary:      Effort: Pulmonary effort is normal.      Breath sounds: Normal breath sounds. No wheezing or rales.   Abdominal:      General: Bowel sounds are normal.      Palpations: Abdomen is soft. There is no mass.      Tenderness: There is no abdominal tenderness.   Lymphadenopathy:      Cervical: No cervical adenopathy.   Skin:     General: Skin is warm.      Findings: No rash.   Neurological:      Mental Status: She is alert and oriented to person, place, and time.       MMG normal.  BMD normal.  EKG stable.      Assessment:       1. Encounter for preventive health examination    2. Acquired hypothyroidism    3. LLQ pain          Plan:     Encounter for preventive health examination- Report results when available.  HAV now.    Acquired hypothyroidism    LLQ pain, neg CT. Ref to Gyn.          "

## 2020-07-10 ENCOUNTER — OFFICE VISIT (OUTPATIENT)
Dept: INTERNAL MEDICINE | Facility: CLINIC | Age: 50
End: 2020-07-10

## 2020-07-10 ENCOUNTER — CLINICAL SUPPORT (OUTPATIENT)
Dept: INTERNAL MEDICINE | Facility: CLINIC | Age: 50
End: 2020-07-10
Payer: COMMERCIAL

## 2020-07-10 ENCOUNTER — HOSPITAL ENCOUNTER (OUTPATIENT)
Dept: CARDIOLOGY | Facility: HOSPITAL | Age: 50
Discharge: HOME | End: 2020-07-10

## 2020-07-10 ENCOUNTER — HOSPITAL ENCOUNTER (OUTPATIENT)
Dept: RADIOLOGY | Facility: HOSPITAL | Age: 50
Discharge: HOME | End: 2020-07-10
Attending: INTERNAL MEDICINE

## 2020-07-10 VITALS
DIASTOLIC BLOOD PRESSURE: 74 MMHG | OXYGEN SATURATION: 16 % | SYSTOLIC BLOOD PRESSURE: 117 MMHG | WEIGHT: 185.19 LBS | HEIGHT: 66 IN | BODY MASS INDEX: 29.76 KG/M2 | HEART RATE: 86 BPM

## 2020-07-10 VITALS
TEMPERATURE: 98 F | HEART RATE: 86 BPM | WEIGHT: 185.19 LBS | BODY MASS INDEX: 29.76 KG/M2 | WEIGHT: 185.19 LBS | BODY MASS INDEX: 29.76 KG/M2 | SYSTOLIC BLOOD PRESSURE: 117 MMHG | DIASTOLIC BLOOD PRESSURE: 74 MMHG | HEIGHT: 66 IN | HEIGHT: 66 IN

## 2020-07-10 DIAGNOSIS — Z00.00 ENCOUNTER FOR PREVENTIVE HEALTH EXAMINATION: Primary | ICD-10-CM

## 2020-07-10 DIAGNOSIS — E03.9 ACQUIRED HYPOTHYROIDISM: ICD-10-CM

## 2020-07-10 DIAGNOSIS — Z00.00 ROUTINE GENERAL MEDICAL EXAMINATION AT A HEALTH CARE FACILITY: ICD-10-CM

## 2020-07-10 DIAGNOSIS — Z00.00 ROUTINE GENERAL MEDICAL EXAMINATION AT A HEALTH CARE FACILITY: Primary | ICD-10-CM

## 2020-07-10 DIAGNOSIS — R10.32 LLQ PAIN: ICD-10-CM

## 2020-07-10 LAB
ALBUMIN SERPL BCP-MCNC: 3.7 G/DL (ref 3.5–5.2)
ALP SERPL-CCNC: 78 U/L (ref 55–135)
ALT SERPL W/O P-5'-P-CCNC: 11 U/L (ref 10–44)
ANION GAP SERPL CALC-SCNC: 9 MMOL/L (ref 8–16)
AST SERPL-CCNC: 13 U/L (ref 10–40)
BILIRUB SERPL-MCNC: 0.6 MG/DL (ref 0.1–1)
BUN SERPL-MCNC: 15 MG/DL (ref 6–20)
CALCIUM SERPL-MCNC: 9.8 MG/DL (ref 8.7–10.5)
CHLORIDE SERPL-SCNC: 104 MMOL/L (ref 95–110)
CHOLEST SERPL-MCNC: 162 MG/DL (ref 120–199)
CHOLEST/HDLC SERPL: 2.9 {RATIO} (ref 2–5)
CO2 SERPL-SCNC: 29 MMOL/L (ref 23–29)
CREAT SERPL-MCNC: 0.8 MG/DL (ref 0.5–1.4)
ERYTHROCYTE [DISTWIDTH] IN BLOOD BY AUTOMATED COUNT: 12.2 % (ref 11.5–14.5)
EST. GFR  (AFRICAN AMERICAN): >60 ML/MIN/1.73 M^2
EST. GFR  (NON AFRICAN AMERICAN): >60 ML/MIN/1.73 M^2
ESTIMATED AVG GLUCOSE: 100 MG/DL (ref 68–131)
GLUCOSE SERPL-MCNC: 98 MG/DL (ref 70–110)
HBA1C MFR BLD HPLC: 5.1 % (ref 4–5.6)
HCT VFR BLD AUTO: 40.1 % (ref 37–48.5)
HDLC SERPL-MCNC: 56 MG/DL (ref 40–75)
HDLC SERPL: 34.6 % (ref 20–50)
HGB BLD-MCNC: 13.1 G/DL (ref 12–16)
LDLC SERPL CALC-MCNC: 91.4 MG/DL (ref 63–159)
MCH RBC QN AUTO: 30.7 PG (ref 27–31)
MCHC RBC AUTO-ENTMCNC: 32.7 G/DL (ref 32–36)
MCV RBC AUTO: 94 FL (ref 82–98)
NONHDLC SERPL-MCNC: 106 MG/DL
PLATELET # BLD AUTO: 289 K/UL (ref 150–350)
PMV BLD AUTO: 10.9 FL (ref 9.2–12.9)
POTASSIUM SERPL-SCNC: 4.5 MMOL/L (ref 3.5–5.1)
PROT SERPL-MCNC: 7 G/DL (ref 6–8.4)
RBC # BLD AUTO: 4.27 M/UL (ref 4–5.4)
SODIUM SERPL-SCNC: 142 MMOL/L (ref 136–145)
TRIGL SERPL-MCNC: 73 MG/DL (ref 30–150)
TSH SERPL DL<=0.005 MIU/L-ACNC: 1.39 UIU/ML (ref 0.4–4)
WBC # BLD AUTO: 6.06 K/UL (ref 3.9–12.7)

## 2020-07-10 PROCEDURE — 90471 HEPATITIS A VACCINE ADULT IM: ICD-10-PCS | Mod: S$GLB,,, | Performed by: INTERNAL MEDICINE

## 2020-07-10 PROCEDURE — 93010 EKG 12-LEAD: ICD-10-PCS | Mod: ,,, | Performed by: INTERNAL MEDICINE

## 2020-07-10 PROCEDURE — 80053 COMPREHEN METABOLIC PANEL: CPT

## 2020-07-10 PROCEDURE — 77063 MAMMO DIGITAL SCREENING BILAT WITH TOMOSYNTHESIS_CAD: ICD-10-PCS | Mod: 26,,, | Performed by: RADIOLOGY

## 2020-07-10 PROCEDURE — 99999 PR PBB SHADOW E&M-EST. PATIENT-LVL IV: ICD-10-PCS | Mod: PBBFAC,,, | Performed by: INTERNAL MEDICINE

## 2020-07-10 PROCEDURE — 77067 MAMMO DIGITAL SCREENING BILAT WITH TOMOSYNTHESIS_CAD: ICD-10-PCS | Mod: 26,,, | Performed by: RADIOLOGY

## 2020-07-10 PROCEDURE — 93005 ELECTROCARDIOGRAM TRACING: CPT

## 2020-07-10 PROCEDURE — 99386 PR PREVENTIVE VISIT,NEW,40-64: ICD-10-PCS | Mod: 25,S$GLB,, | Performed by: INTERNAL MEDICINE

## 2020-07-10 PROCEDURE — 80061 LIPID PANEL: CPT

## 2020-07-10 PROCEDURE — 77063 BREAST TOMOSYNTHESIS BI: CPT | Mod: 26,,, | Performed by: RADIOLOGY

## 2020-07-10 PROCEDURE — 90471 IMMUNIZATION ADMIN: CPT | Mod: S$GLB,,, | Performed by: INTERNAL MEDICINE

## 2020-07-10 PROCEDURE — 77067 SCR MAMMO BI INCL CAD: CPT | Mod: TC

## 2020-07-10 PROCEDURE — 93010 ELECTROCARDIOGRAM REPORT: CPT | Mod: ,,, | Performed by: INTERNAL MEDICINE

## 2020-07-10 PROCEDURE — 99386 PREV VISIT NEW AGE 40-64: CPT | Mod: 25,S$GLB,, | Performed by: INTERNAL MEDICINE

## 2020-07-10 PROCEDURE — 90632 HEPA VACCINE ADULT IM: CPT | Mod: S$GLB,,, | Performed by: INTERNAL MEDICINE

## 2020-07-10 PROCEDURE — 84443 ASSAY THYROID STIM HORMONE: CPT

## 2020-07-10 PROCEDURE — 85027 COMPLETE CBC AUTOMATED: CPT

## 2020-07-10 PROCEDURE — 99999 PR PBB SHADOW E&M-EST. PATIENT-LVL IV: CPT | Mod: PBBFAC,,, | Performed by: INTERNAL MEDICINE

## 2020-07-10 PROCEDURE — 77067 SCR MAMMO BI INCL CAD: CPT | Mod: 26,,, | Performed by: RADIOLOGY

## 2020-07-10 PROCEDURE — 83036 HEMOGLOBIN GLYCOSYLATED A1C: CPT

## 2020-07-10 PROCEDURE — 90632 HEPATITIS A VACCINE ADULT IM: ICD-10-PCS | Mod: S$GLB,,, | Performed by: INTERNAL MEDICINE

## 2020-07-10 NOTE — LETTER
July 12, 2020    Bing Mclaughlin  [address]  [place] [address]             [facility]  [address] [place] [address]  Phone: [phone]  Fax: [phone] Dear Ms. Mclaughlin,    It was a pleasure to see you and perform your Executive Health physical on   Friday, July 10, 2020.  At the time of your visit you are 49 years old.  You have been saying secluded and working from home.  You had a colonoscopy which was normal.  You continue with some ongoing left pelvic pain that seems to radiate into the left low back area.  One year ago you had a CT of the abdomen and pelvis which was negative/normal.  He denies any fevers, sweats, or coughing.  Your riding a bike about 6 miles, 5 days per week.  You deny any exertional chest pain, shortness of breath, or palpitations.  Your bowel and bladder function has been normal.  You have been taking oral vitamin D.    Your past medical history is significant for allergic rhinitis, hypothyroid, migraines, hysterectomy left oophorectomy, bilateral tubal ligation, and right wrist surgery.  Your medications include albuterol, Allegra, hydrochlorothiazide, Synthroid, cytomel, Singulair.  You have no known medication allergies.    Your health maintenance includes 09/2017 flu VAX, 07/2020 hepatitis-A vaccination, 09/2017 Prevnar vaccination, 01/2017 tetanus diphtheria pertussis vaccination, 07/2020 mammogram, 07/2020 bone density, 04/2016 gynecologic exam with Dr. Hackett, 06/2020 colonoscopy repeat due in 10 years.     On physical exam height is 5 ft 5.5 in , your weight is 185 lb,  body mass index is 30.35.Your blood pressure 117/74,  pulse 86,  Your general appearance is well-developed,  with no distress. Your head and neck exam is normal.  Your heart has a regular rate and rhythm with no abnormal sounds.  Your lungs are clear throughout, with normal respiratory effort.  Your abdomen has normal bowel sounds with no masses or  enlarged organs noted. Your extremities have strong distal pulses, with no swelling.     Your mammogram is normal.  Your bone density is normal.  Your EKG is stable.  Your labs show a normal blood count, platelets normal.  Your kidney and liver function is normal.  Your electrolytes are normal.  Your fasting glucose 98, normal.  Your total cholesterol is 162, HDL 56, LDL 91, triglycerides 73.  This a very good lipid panel.  Your hemoglobin A1c 5.1%, normal.  TSH 1.393, normal.    In summary you appear to be in very good general health.  Please continue your exercise to continue your good vascular condition.  We gave you the hepatitis a vaccination which has you current on all vaccinations and preventive care needed at this time.  We discussed seeing the gynecologist for further evaluation of your left pelvic pain.    It was a pleasure to see you and perform your executive physical.  If I can be of further assistance, or if you have any other questions or concerns please do not hesitate to let me know.    Yours very truly,      Kirsten Paniagua MD

## 2020-08-31 ENCOUNTER — OFFICE VISIT (OUTPATIENT)
Dept: OBSTETRICS AND GYNECOLOGY | Facility: CLINIC | Age: 50
End: 2020-08-31
Payer: COMMERCIAL

## 2020-08-31 VITALS
BODY MASS INDEX: 31.96 KG/M2 | DIASTOLIC BLOOD PRESSURE: 92 MMHG | WEIGHT: 191.81 LBS | SYSTOLIC BLOOD PRESSURE: 132 MMHG | HEIGHT: 65 IN

## 2020-08-31 DIAGNOSIS — Z00.00 ENCOUNTER FOR PREVENTIVE HEALTH EXAMINATION: ICD-10-CM

## 2020-08-31 DIAGNOSIS — R10.32 LLQ PAIN: ICD-10-CM

## 2020-08-31 PROCEDURE — 99999 PR PBB SHADOW E&M-EST. PATIENT-LVL III: ICD-10-PCS | Mod: PBBFAC,,, | Performed by: OBSTETRICS & GYNECOLOGY

## 2020-08-31 PROCEDURE — 99999 PR PBB SHADOW E&M-EST. PATIENT-LVL III: CPT | Mod: PBBFAC,,, | Performed by: OBSTETRICS & GYNECOLOGY

## 2020-08-31 PROCEDURE — 99202 OFFICE O/P NEW SF 15 MIN: CPT | Mod: S$GLB,,, | Performed by: OBSTETRICS & GYNECOLOGY

## 2020-08-31 PROCEDURE — 99202 PR OFFICE/OUTPT VISIT, NEW, LEVL II, 15-29 MIN: ICD-10-PCS | Mod: S$GLB,,, | Performed by: OBSTETRICS & GYNECOLOGY

## 2020-08-31 NOTE — LETTER
September 2, 2020      Kirsten Paniagua MD  [address]  [facility] [address]           [facility]  [id number] [place] [address]  [place] [address]  Phone: [phone]  Fax: [phone]          Patient: Bing Mclaughlin   MR Number: [id number]   YOB: 1970   Date of Visit: 8/31/2020       Dear Dr. Kirsten Paniagua:    Thank you for referring Bing Mclaughlin to me for evaluation. Attached you will find relevant portions of my assessment and plan of care.    If you have questions, please do not hesitate to call me. I look forward to following Bing Mclaughlin along with you.    Sincerely,    Samia Hackett MD    Enclosure  CC:  No Recipients    If you would like to receive this communication electronically, please contact [email or web address] or [phone] to request more information on [facility] Interactive Link access.    For providers and/or their staff who would like to refer a patient to [facility], please contact us through our one-stop-shop provider referral line, [facility], at [phone].    If you feel you have received this communication in error or would no longer like to receive these types of communications, please e-mail [email or web address]

## 2020-08-31 NOTE — PROGRESS NOTES
CHIEF COMPLAINT:   Chief Complaint   Patient presents with    Pelvic Pain       HISTORY OF PRESENT ILLNESS    Bing Mclaughlin 50 y.o.  complains of: pain like she had prior to her hysterectomy and LSO. The back pain and pain w her menses monthly had resolved for 3y. Past few mo the same pain is recurring on the L pelvic area.  Endometriosis was suspected then. There was a pathologic diasnosis of adenomyosis.  She has not been on any ERT or pain meds since.              3/29/2017   PRE-PROCEDURE COUNSELING  Patient counseled on the risks, benefits, and alternatives to procedure. Please see preoperative consents.   SURGEON: Samia Hackett M.D.   ASSISTANT: Gris Flores, and Lynn WHITEs   PREOPERATIVE DIAGNOSES: Dysmenorrhea, pelvic pain, L complex ov cyst, adhesions, suspected endometriosis, simple R ov cyst    POSTOPERATIVE DIAGNOSES: Same, simple R ov cyst    PROCEDURE:   Robotic-assisted laparoscopic hysterectomy, left salpingo-oophorectomy, right salpingectomy, adhesiolysis 15min (right ovary was preserved)   EBL: 20mL    FINDINGS: boggy uterus, benign appearing pelvic organs, L ovary with cyst adhesed along with tube to the L pelvic gutter and the left posterior uterine surface. 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           There are no complaints and the procedure and hospitalization occured without complications.   Pt came in again because had a small amount dark spotting beginning of this wk. stoppe dsince. Did not have sex yet.     The operative findings and pathology were reviewed with the patient.      FINAL PATHOLOGIC DIAGNOSIS  Uterus, cervix, left ovary and bilateral fallopian tubes:  Bilateral fallopian tubes: No significant histologic abnormality.  Left ovary: Benign hemorrhagic corpus luteum cyst and normal physiologic structures.  Cervix: Benign  nabothian cysts.  Endometrium: Secretory pattern.  Myometrium: Adenomyosis.  Serosa: No significant histologic abnormality.    HISTORY  Patient Active Problem List   Diagnosis    Migraines    Acquired hypothyroidism    Endometriosis of pelvis    Adenomyosis    Dysmenorrhea    Pelvic pain in female    LLQ pain    Status post laparoscopic hysterectomy       Past Medical History:   Diagnosis Date    Allergic rhinitis     Hypothyroid     Migraines        Past Surgical History:   Procedure Laterality Date    COLONOSCOPY N/A 2020    Procedure: COLONOSCOPY;  Surgeon: Camille Ridley MD;  Location: [facility];  Service: Endoscopy;  Laterality: N/A;    HYSTERECTOMY      OOPHORECTOMY      left ovary    TUBAL LIGATION      WRIST SURGERY      right, cyst removed.       Family History   Problem Relation Age of Onset    Uterine cancer Mother     Cancer Mother     Ovarian cancer Mother     Cancer Sister     Deep vein thrombosis Neg Hx      labor Neg Hx     Breast cancer Neg Hx        Social History     Socioeconomic History    Marital status:      Spouse name: Not on file    Number of children: Not on file    Years of education: Not on file    Highest education level: Not on file   Occupational History    Not on file   Social Needs    Financial resource strain: Not hard at all    Food insecurity     Worry: Never true     Inability: Never true    Transportation needs     Medical: No     Non-medical: No   Tobacco Use    Smoking status: Never Smoker    Smokeless tobacco: Never Used   Substance and Sexual Activity    Alcohol use: Yes     Alcohol/week: 0.0 standard drinks     Frequency: 2-4 times a month     Drinks per session: 1 or 2     Binge frequency: Never     Comment: occ    Drug use: No    Sexual activity: Yes     Partners: Male     Birth control/protection: Surgical     Comment: tubal   Lifestyle    Physical activity     Days per week: 1 day     Minutes per session: 40  "min    Stress: Not at all   Relationships    Social connections     Talks on phone: More than three times a week     Gets together: More than three times a week     Attends [employer] service: Not on file     Active member of club or organization: No     Attends meetings of clubs or organizations: Never     Relationship status:    Other Topics Concern    Not on file   Social History Narrative    Not on file       Current Outpatient Medications   Medication Sig Dispense Refill    fexofenadine HCl (ALLEGRA ORAL) Take by mouth.      hydroCHLOROthiazide (HYDRODIURIL) 25 MG tablet hydrochlorothiazide 25 mg tablet   TAKE 1 TABLET BY MOUTH EVERY DAY IN THE MORNING      levothyroxine (SYNTHROID) 150 MCG tablet levothyroxine 150 mcg tablet   Take 1 tablet every day by oral route in the morning for 30 days.      liothyronine (CYTOMEL) 5 MCG Tab liothyronine 5 mcg tablet   Take 1 tablet twice a day by oral route for 30 days.      montelukast (SINGULAIR) 10 mg tablet Take 10 mg by mouth every evening.      phentermine (ADIPEX-P) 37.5 mg tablet phentermine 37.5 mg tablet   Take 1 tablet every day by oral route.      albuterol (PROAIR HFA) 90 mcg/actuation inhaler ProAir HFA 90 mcg/actuation aerosol inhaler   Inhale 2 puffs every 4 hours by inhalation route.       No current facility-administered medications for this visit.        Review of patient's allergies indicates:  No Known Allergies        PHYSICAL EXAM     Vitals:    08/31/20 1531   BP: (!) 132/92   Weight: 87 kg (191 lb 12.8 oz)   Height: 5' 5" (1.651 m)   PainSc: 0-No pain        PAIN SCALE: 0/10 None    ROS:  GENERAL: No fever, chills, fatigability or weight loss.  ABDOMEN: Appetite fine. No weight loss. Denies diarrhea,  hematemesis or blood in stool.  URINARY: No flank pain, dysuria or hematuria.  REPRODUCTIVE: No abnormal vaginal bleeding.   BREASTS: Breasts symmetric, nontender and no lumps detected.     PE:   APPEARANCE: Well nourished, well " developed, in no acute distress.  ABDOMEN: Soft. No tenderness or masses.  No hernias.      PELVIC:   VULVA: No lesions. Normal female genitalia.  URETHRAL MEATUS: Normal size and location, no lesions, no prolapse.  URETHRA: No masses, tenderness, prolapse or scarring.  VAGINA: Moist and well rugated, no discharge, no significant cystocele or rectocele.  CERVIX:  UTERUS:  Surgically absent   ADNEXA: No masses, but left adnexal fullness and mild tenderness no CDS nodularity.  ANUS PERINEUM: No lesions, no relaxation, external hemorrhoids noted.      DIAGNOSIS:     1. Encounter for preventive health examination    2. LLQ pain        PLAN:   Orders Placed This Encounter   Procedures    US Pelvis Limited Non OB     Standing Status:   Future     Standing Expiration Date:   8/31/2021     Order Specific Question:   Is the patient pregnant?     Answer:   No        MEDICATIONS PRESCRIBED:   none            COUNSELING:  Patient was counseled today on A.C.S. Pap guidelines and recommendations for yearly pelvic exams, mammograms and monthly self breast exams; to see her PCP for other health maintenance.     FOLLOW-UP: With me after u/s results.

## 2020-09-04 ENCOUNTER — TELEPHONE (OUTPATIENT)
Dept: RADIOLOGY | Facility: HOSPITAL | Age: 50
End: 2020-09-04

## 2020-09-08 ENCOUNTER — HOSPITAL ENCOUNTER (OUTPATIENT)
Dept: RADIOLOGY | Facility: HOSPITAL | Age: 50
Discharge: HOME | End: 2020-09-08
Attending: OBSTETRICS & GYNECOLOGY
Payer: COMMERCIAL

## 2020-09-08 DIAGNOSIS — R10.32 LLQ PAIN: ICD-10-CM

## 2020-09-08 PROCEDURE — 76830 TRANSVAGINAL US NON-OB: CPT | Mod: 26,,, | Performed by: RADIOLOGY

## 2020-09-08 PROCEDURE — 76856 US PELVIS COMP WITH TRANSVAG NON-OB (XPD): ICD-10-PCS | Mod: 26,,, | Performed by: RADIOLOGY

## 2020-09-08 PROCEDURE — 76830 TRANSVAGINAL US NON-OB: CPT | Mod: TC

## 2020-09-08 PROCEDURE — 76856 US EXAM PELVIC COMPLETE: CPT | Mod: 26,,, | Performed by: RADIOLOGY

## 2020-09-08 PROCEDURE — 76830 US PELVIS COMP WITH TRANSVAG NON-OB (XPD): ICD-10-PCS | Mod: 26,,, | Performed by: RADIOLOGY

## 2020-09-17 ENCOUNTER — OFFICE VISIT (OUTPATIENT)
Dept: OBSTETRICS AND GYNECOLOGY | Facility: CLINIC | Age: 50
End: 2020-09-17
Payer: COMMERCIAL

## 2020-09-17 VITALS
DIASTOLIC BLOOD PRESSURE: 78 MMHG | BODY MASS INDEX: 31.33 KG/M2 | WEIGHT: 188.06 LBS | HEIGHT: 65 IN | SYSTOLIC BLOOD PRESSURE: 126 MMHG

## 2020-09-17 DIAGNOSIS — R10.2 PELVIC PAIN IN FEMALE: Primary | ICD-10-CM

## 2020-09-17 PROCEDURE — 99999 PR PBB SHADOW E&M-EST. PATIENT-LVL III: ICD-10-PCS | Mod: PBBFAC,,, | Performed by: OBSTETRICS & GYNECOLOGY

## 2020-09-17 PROCEDURE — 99212 PR OFFICE/OUTPT VISIT, EST, LEVL II, 10-19 MIN: ICD-10-PCS | Mod: S$GLB,,, | Performed by: OBSTETRICS & GYNECOLOGY

## 2020-09-17 PROCEDURE — 99212 OFFICE O/P EST SF 10 MIN: CPT | Mod: S$GLB,,, | Performed by: OBSTETRICS & GYNECOLOGY

## 2020-09-17 PROCEDURE — 99999 PR PBB SHADOW E&M-EST. PATIENT-LVL III: CPT | Mod: PBBFAC,,, | Performed by: OBSTETRICS & GYNECOLOGY

## 2020-09-17 NOTE — PROGRESS NOTES
CHIEF COMPLAINT:   Chief Complaint   Patient presents with    Pelvic Pain       HISTORY OF PRESENT ILLNESS    Bing Mclaughlin 50 y.o.  for f/u of her L sided pain (she has history of only R ov remaining). Wondering if this is gyn in origin. Hysterectomy was done for CPP and suspected endoemtriosis. Adenomyosis was found on path.   Patient has no recent unexplained weight loss, bloating, appetite loss, or bowel movement pattern change.       TECHNIQUE:  Transabdominal sonography of the pelvis was performed, followed by transvaginal sonography to better evaluate the uterus and ovaries.     COMPARISON:  None.     FINDINGS:  Uterus:     Surgically removed.     Right ovary:     Size: 3.4 x 1.7 x 1.6 cm     Appearance: Visualized only transabdominally.  Grossly unremarkable.     Vascular flow: Normal.     Left ovary:     Surgically removed.     Free Fluid:     None.     Impression:     Status post hysterectomy and left oophorectomy.  No pelvic abnormality.        Electronically signed by: KARLA Abad MD  Date:                                            2020          3/29/2017   PRE-PROCEDURE COUNSELING  Patient counseled on the risks, benefits, and alternatives to procedure. Please see preoperative consents.   SURGEON: Samia Hackett M.D.   ASSISTANT: Gris Flores, and [facility]   PREOPERATIVE DIAGNOSES: Dysmenorrhea, pelvic pain, L complex ov cyst, adhesions, suspected endometriosis, simple R ov cyst    POSTOPERATIVE DIAGNOSES: Same, simple R ov cyst    PROCEDURE:   Robotic-assisted laparoscopic hysterectomy, left salpingo-oophorectomy, right salpingectomy, adhesiolysis 15min (right ovary was preserved)   EBL: 20mL    FINDINGS: boggy uterus, benign appearing pelvic organs, L ovary with cyst adhesed along with tube to the L pelvic gutter and the left posterior uterine surface. 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           There are no complaints and the procedure and hospitalization occured without complications.   Pt came in again because had a small amount dark spotting beginning of this wk. stoppe dsince. Did not have sex yet.     The operative findings and pathology were reviewed with the patient.      FINAL PATHOLOGIC DIAGNOSIS  Uterus, cervix, left ovary and bilateral fallopian tubes:  Bilateral fallopian tubes: No significant histologic abnormality.  Left ovary: Benign hemorrhagic corpus luteum cyst and normal physiologic structures.  Cervix: Benign nabothian cysts.  Endometrium: Secretory pattern.  Myometrium: Adenomyosis.  Serosa: No significant histologic abnormality.         HISTORY  Patient Active Problem List   Diagnosis    Migraines    Acquired hypothyroidism    Endometriosis of pelvis    Adenomyosis    Dysmenorrhea    Pelvic pain in female    LLQ pain    Status post laparoscopic hysterectomy       Past Medical History:   Diagnosis Date    Allergic rhinitis     Hypothyroid     Migraines        Past Surgical History:   Procedure Laterality Date    CERVICAL CERCLAGE      COLD KNIFE CONIZATION OF CERVIX      COLONOSCOPY N/A 2020    Procedure: COLONOSCOPY;  Surgeon: Camille Ridley MD;  Location: [facility];  Service: Endoscopy;  Laterality: N/A;    HYSTERECTOMY      OOPHORECTOMY      left ovary    TUBAL LIGATION      WRIST SURGERY      right, cyst removed.       Family History   Problem Relation Age of Onset    Uterine cancer Mother     Cancer Mother     Ovarian cancer Mother     Cancer Sister     Deep vein thrombosis Neg Hx      labor Neg Hx     Breast cancer Neg Hx        Social History     Socioeconomic History    Marital status:      Spouse name: Not on file    Number of children: Not on file    Years of education: Not on file    Highest education level: Not on file   Occupational History     Not on file   Social Needs    Financial resource strain: Not hard at all    Food insecurity     Worry: Never true     Inability: Never true    Transportation needs     Medical: No     Non-medical: No   Tobacco Use    Smoking status: Never Smoker    Smokeless tobacco: Never Used   Substance and Sexual Activity    Alcohol use: Yes     Alcohol/week: 0.0 standard drinks     Frequency: 2-4 times a month     Drinks per session: 1 or 2     Binge frequency: Never     Comment: occ    Drug use: No    Sexual activity: Yes     Partners: Male     Birth control/protection: Surgical     Comment: tubal   Lifestyle    Physical activity     Days per week: 1 day     Minutes per session: 40 min    Stress: Not at all   Relationships    Social connections     Talks on phone: More than three times a week     Gets together: More than three times a week     Attends Religion service: Not on file     Active member of club or organization: No     Attends meetings of clubs or organizations: Never     Relationship status:    Other Topics Concern    Not on file   Social History Narrative    Not on file       Current Outpatient Medications   Medication Sig Dispense Refill    albuterol (PROAIR HFA) 90 mcg/actuation inhaler ProAir HFA 90 mcg/actuation aerosol inhaler   Inhale 2 puffs every 4 hours by inhalation route.      fexofenadine HCl (ALLEGRA ORAL) Take 1 tablet by mouth 2 (two) times a day.       hydroCHLOROthiazide (HYDRODIURIL) 25 MG tablet hydrochlorothiazide 25 mg tablet   TAKE 1 TABLET BY MOUTH EVERY DAY IN THE MORNING      levothyroxine (SYNTHROID) 150 MCG tablet levothyroxine 150 mcg tablet   Take 1 tablet every day by oral route in the morning for 30 days.      liothyronine (CYTOMEL) 5 MCG Tab liothyronine 5 mcg tablet   Take 1 tablet twice a day by oral route for 30 days.      montelukast (SINGULAIR) 10 mg tablet Take 10 mg by mouth every evening.      naproxen sodium (ALEVE ORAL) Take 1 tablet by mouth 2  "(two) times a day.      phentermine (ADIPEX-P) 37.5 mg tablet phentermine 37.5 mg tablet   Take 1 tablet every day by oral route.       No current facility-administered medications for this visit.        Review of patient's allergies indicates:  No Known Allergies        PHYSICAL EXAM     Vitals:    09/17/20 1531   BP: 126/78   Weight: 85.3 kg (188 lb 0.8 oz)   Height: 5' 5" (1.651 m)   PainSc:   3   PainLoc: Groin        PAIN SCALE: 0/10 None    ROS:  GENERAL: No fever, chills, fatigability or weight loss.  ABDOMEN: Appetite fine. No weight loss. Denies diarrhea, abdominal pain, hematemesis or blood in stool.  URINARY: No flank pain, dysuria or hematuria.  REPRODUCTIVE: No abnormal vaginal bleeding.    BREASTS: Breasts symmetric, nontender and no lumps detected.     PE:   APPEARANCE: Well nourished, well developed, in no acute distress.       DIAGNOSIS:     1. Pelvic pain in female        PLAN:   Orders Placed This Encounter   Procedures    CT Abdomen Pelvis With Contrast     Standing Status:   Future     Standing Expiration Date:   9/17/2021     Order Specific Question:   Is the patient allergic to iodine or contrast?     Answer:   No     Order Specific Question:   Is the patient on ANY Metformin drug such as Glucophage/Glucovance?           Should be off drug 48 hours after contrast. Check renal function before restart.     Answer:   No     Order Specific Question:   History of Kidney Disease - including: decreased kidney function, dialysis, kidney transplay, single kidney, kidney cancer, kidney surgery?     Answer:   None     Order Specific Question:   Does the patient have high blood pressure requiring medical treatment?     Answer:   No     Order Specific Question:   Diabetes?     Answer:   No     Order Specific Question:   May the Radiologist modify the order per protocol to meet the clinical needs of the patient?     Answer:   Yes     Order Specific Question:   Oral/Rectal Contrast instructions:     " Answer:   Routine Oral Contrast     Order Specific Question:   Special CT ABD Protocol Request?     Answer:   Routine        MEDICATIONS PRESCRIBED:   PNV             COUNSELING:  Patient was counseled today on A.C.S. Pap guidelines and recommendations for yearly pelvic exams, mammograms and monthly self breast exams; to see her PCP for other health maintenance.     FOLLOW-UP: With me after CT results.  Consider   possible LSC for unexplained pain.

## 2020-09-25 ENCOUNTER — TELEPHONE (OUTPATIENT)
Dept: RADIOLOGY | Facility: HOSPITAL | Age: 50
End: 2020-09-25

## 2020-09-28 ENCOUNTER — HOSPITAL ENCOUNTER (OUTPATIENT)
Dept: RADIOLOGY | Facility: HOSPITAL | Age: 50
Discharge: HOME | End: 2020-09-28
Attending: OBSTETRICS & GYNECOLOGY
Payer: COMMERCIAL

## 2020-09-28 DIAGNOSIS — R10.2 PELVIC PAIN IN FEMALE: ICD-10-CM

## 2020-09-28 PROCEDURE — 25500020 PHARM REV CODE 255: Performed by: OBSTETRICS & GYNECOLOGY

## 2020-09-28 PROCEDURE — 74177 CT ABDOMEN PELVIS WITH CONTRAST: ICD-10-PCS | Mod: 26,,, | Performed by: RADIOLOGY

## 2020-09-28 PROCEDURE — 74177 CT ABD & PELVIS W/CONTRAST: CPT | Mod: TC

## 2020-09-28 PROCEDURE — 74177 CT ABD & PELVIS W/CONTRAST: CPT | Mod: 26,,, | Performed by: RADIOLOGY

## 2020-09-28 RX ADMIN — IOHEXOL 1000 ML: 12 SOLUTION ORAL at 02:09

## 2020-09-28 RX ADMIN — IOHEXOL 100 ML: 350 INJECTION, SOLUTION INTRAVENOUS at 03:09

## 2020-10-16 ENCOUNTER — TELEPHONE (OUTPATIENT)
Dept: NEUROSURGERY | Facility: CLINIC | Age: 50
End: 2020-10-16

## 2020-11-02 ENCOUNTER — PATIENT OUTREACH (OUTPATIENT)
Dept: ADMINISTRATIVE | Facility: OTHER | Age: 50
End: 2020-11-02

## 2020-11-03 ENCOUNTER — OFFICE VISIT (OUTPATIENT)
Dept: NEUROSURGERY | Facility: CLINIC | Age: 50
End: 2020-11-03
Payer: COMMERCIAL

## 2020-11-03 ENCOUNTER — HOSPITAL ENCOUNTER (OUTPATIENT)
Dept: RADIOLOGY | Facility: HOSPITAL | Age: 50
Discharge: HOME | End: 2020-11-03
Attending: PHYSICIAN ASSISTANT
Payer: COMMERCIAL

## 2020-11-03 VITALS
BODY MASS INDEX: 31.48 KG/M2 | WEIGHT: 188.94 LBS | SYSTOLIC BLOOD PRESSURE: 128 MMHG | HEIGHT: 65 IN | DIASTOLIC BLOOD PRESSURE: 92 MMHG | RESPIRATION RATE: 18 BRPM | HEART RATE: 89 BPM

## 2020-11-03 DIAGNOSIS — M54.16 LUMBAR RADICULOPATHY: Primary | ICD-10-CM

## 2020-11-03 DIAGNOSIS — M54.9 DORSALGIA, UNSPECIFIED: ICD-10-CM

## 2020-11-03 PROCEDURE — 99204 PR OFFICE/OUTPT VISIT, NEW, LEVL IV, 45-59 MIN: ICD-10-PCS | Mod: S$GLB,,, | Performed by: PHYSICIAN ASSISTANT

## 2020-11-03 PROCEDURE — 99204 OFFICE O/P NEW MOD 45 MIN: CPT | Mod: S$GLB,,, | Performed by: PHYSICIAN ASSISTANT

## 2020-11-03 PROCEDURE — 72110 X-RAY EXAM L-2 SPINE 4/>VWS: CPT | Mod: 26,,, | Performed by: RADIOLOGY

## 2020-11-03 PROCEDURE — 99999 PR PBB SHADOW E&M-EST. PATIENT-LVL III: CPT | Mod: PBBFAC,,, | Performed by: PHYSICIAN ASSISTANT

## 2020-11-03 PROCEDURE — 72110 XR LUMBAR SPINE 5 VIEW WITH FLEX AND EXT: ICD-10-PCS | Mod: 26,,, | Performed by: RADIOLOGY

## 2020-11-03 PROCEDURE — 72110 X-RAY EXAM L-2 SPINE 4/>VWS: CPT | Mod: TC

## 2020-11-03 PROCEDURE — 99999 PR PBB SHADOW E&M-EST. PATIENT-LVL III: ICD-10-PCS | Mod: PBBFAC,,, | Performed by: PHYSICIAN ASSISTANT

## 2020-11-03 NOTE — PROGRESS NOTES
"Subjective:      Patient ID: Bing Mclaughlin is a 50 y.o. female.    Chief Complaint: Lumbar Spine Pain (L-Spine) (LBP ) and Extremity Pain (Bilateral leg )    HPI  The patient is here today for evaluation of back and leg pain.  Referred by her gynecologist- Dr. Hackett.    In 2017 she had L ovary, uterus, cervix removed.   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  After surgery her back pain improved however it is starting to get worse again.  Patient is concerned that it was scar tissue however CT chest/abd/pelvis did not show anything.  It was suggested to see NSG.  Her pain starts on left lower side of her back and it radiates "through" to the front at LLQ.  She does have pain on both sides of her back and hips.  The pain will radiate down her legs- she points mid thigh on both legs. The pain will shoot down her legs to her feet.  Tingling in dorsum of L foot- dorsum and second toe only.  No tingling of R foot.    Patient has completed physical therapy in the past. She has also performed HEP for months along with chiro treatment and dry needling.    Denies bladder/bowel changes.  Currently takes Aleve in AM and night time.    No previous back surgery or injections.  Rates her pain 6/10.     Recent DEXA showed no evidence of osteopenia/osteoporosis.    Past Medical History:   Diagnosis Date    Allergic rhinitis     Hypothyroid     Migraines      Past Surgical History:   Procedure Laterality Date    CERVICAL CERCLAGE      COLD KNIFE CONIZATION OF CERVIX      COLONOSCOPY N/A 6/5/2020    Procedure: COLONOSCOPY;  Surgeon: Camille Ridley MD;  Location: [facility];  Service: Endoscopy;  Laterality: N/A;    HYSTERECTOMY      OOPHORECTOMY      left ovary    TUBAL LIGATION      WRIST SURGERY      right, cyst removed.     Family History   Problem Relation Age of Onset    Uterine cancer Mother     Cancer Mother     Ovarian cancer Mother     Cancer Sister     Deep vein " thrombosis Neg Hx      labor Neg Hx     Breast cancer Neg Hx      Social History     Socioeconomic History    Marital status:      Spouse name: Not on file    Number of children: Not on file    Years of education: Not on file    Highest education level: Not on file   Occupational History    Not on file   Social Needs    Financial resource strain: Not hard at all    Food insecurity     Worry: Never true     Inability: Never true    Transportation needs     Medical: No     Non-medical: No   Tobacco Use    Smoking status: Never Smoker    Smokeless tobacco: Never Used   Substance and Sexual Activity    Alcohol use: Yes     Alcohol/week: 0.0 standard drinks     Frequency: 2-4 times a month     Drinks per session: 1 or 2     Binge frequency: Never     Comment: occ    Drug use: No    Sexual activity: Yes     Partners: Male     Birth control/protection: Surgical     Comment: tubal   Lifestyle    Physical activity     Days per week: 1 day     Minutes per session: 40 min    Stress: Not at all   Relationships    Social connections     Talks on phone: More than three times a week     Gets together: More than three times a week     Attends [employer] service: Not on file     Active member of club or organization: No     Attends meetings of clubs or organizations: Never     Relationship status:    Other Topics Concern    Not on file   Social History Narrative    Live w/ spouse      Medication List with Changes/Refills   Current Medications    ALBUTEROL (PROAIR HFA) 90 MCG/ACTUATION INHALER    ProAir HFA 90 mcg/actuation aerosol inhaler   Inhale 2 puffs every 4 hours by inhalation route.    FEXOFENADINE HCL (ALLEGRA ORAL)    Take 1 tablet by mouth 2 (two) times a day.     HYDROCHLOROTHIAZIDE (HYDRODIURIL) 25 MG TABLET    hydrochlorothiazide 25 mg tablet   TAKE 1 TABLET BY MOUTH EVERY DAY IN THE MORNING    LEVOTHYROXINE (SYNTHROID) 150 MCG TABLET    levothyroxine 150 mcg tablet   Take 1 tablet  every day by oral route in the morning for 30 days.    LIOTHYRONINE (CYTOMEL) 5 MCG TAB    liothyronine 5 mcg tablet   Take 1 tablet twice a day by oral route for 30 days.    MONTELUKAST (SINGULAIR) 10 MG TABLET    Take 10 mg by mouth every evening.    NAPROXEN SODIUM (ALEVE ORAL)    Take 1 tablet by mouth 2 (two) times a day.    PHENTERMINE (ADIPEX-P) 37.5 MG TABLET    phentermine 37.5 mg tablet   Take 1 tablet every day by oral route.     Review of patient's allergies indicates:  No Known Allergies  Review of Systems   Constitution: Negative for chills and decreased appetite.   HENT: Negative for congestion.    Eyes: Negative for blurred vision.   Cardiovascular: Negative for chest pain and claudication.   Respiratory: Negative for cough and hemoptysis.    Endocrine: Negative for cold intolerance.   Skin: Negative for color change and rash.   Musculoskeletal: Positive for back pain, joint pain, muscle cramps and myalgias. Negative for falls.   Gastrointestinal: Negative for abdominal pain.   Genitourinary: Negative for bladder incontinence.   Neurological: Positive for numbness. Negative for tremors.   Psychiatric/Behavioral: Negative for altered mental status.   Allergic/Immunologic: Negative for environmental allergies.         Objective:     Body mass index is 31.44 kg/m².  Vitals:    11/03/20 0952   BP: (!) 128/92   Pulse: 89   Resp: 18          Nursing note and vitals reviewed  Gen:Oriented to person, place, and time.             Appears stated age   Skin: no rashes or lesions identified   Head:Normocephalic and atraumatic.  Nose: Nose normal.    Eyes: EOM are normal. Pupils are equal, round, and reactive to light.   Neck: Neck supple. No masses or lesions palpated  Cardiovascular: Intact distal pulses.    Abdominal: Soft.   Neurological: Alert and oriented to person, place, and time.  No cranial nerve deficit.  Coordination normal. Normal muscle tone  Psychiatric: Normal mood and affect. Behavior is  normal.      Back:  None Present- left lower lumbar , LSIJ Paraspinal muscle spasms   None present Pain with flexion and extention   WNL limited Range of motion    Neg  Straight leg raise     Motor   Right Right Left Left  Level Group   5  5  L2 Hip flexor (Psoas)   5  5  L3 Leg extension (Quads)   5  5 4+ L4 Dorsiflexion & foot inversion (Tibialis Anterior)   5  5 4+ L5 Great toe extension ( EHL)   5  5  S1 Foot eversion (Gastroc, PL & PB)     Sensation  NL Decreased (R/L/BL) Level Sensation    X  L2 Anterio-medial thigh   X  L3 Medial thigh around knee   X  L4 Medial foot   X  L5 Dorsum foot   X  S1 Lateral foot     Reflex  2+  Patellar tendon (L4)   2+  Achilles tendon (S1)       Imaging/Results:    David Abad MD  [phone] 9/28/2020 Routine      Narrative & Impression     EXAMINATION:  CT ABDOMEN PELVIS WITH CONTRAST     CLINICAL HISTORY:  abd pelvic pain on left side; Pelvic and perineal pain     TECHNIQUE:  Low dose axial images, sagittal and coronal reformations were obtained from the lung bases to the pubic symphysis following the IV administration of 100 mL of Omnipaque 350 and the oral administration of 30 mL of Omnipaque 350.     COMPARISON:  07/16/2019.     FINDINGS:  Visualized Chest: Lung bases are clear     Abdomen:     Liver: Small 8 mm cyst in the lateral segment left hepatic lobe, unchanged.     Gallbladder and biliary: Within normal limits.     Spleen: Within normal limits.     Pancreas: Within normal limits.     Adrenals: Within normal limits.     Kidneys: Within normal limits.     Bowel: Within normal limits.  No evidence of obstruction.     Peritoneum: No ascites or pneumoperitoneum.     Abdominal adenopathy: None.     Vasculature: Within normal limits.     Pelvis:     Urinary bladder: Unremarkable.     Reproductive organs: Status post hysterectomy.  Right ovary intact with small 1.5 cm cyst.  Left ovary not visualized.     Pelvic adenopathy: None.     Bones: Degenerative changes in  the spine.  No acute fracture.  No intraosseous lesion.     Miscellaneous: None.     Impression:     No acute abnormality in the abdomen or pelvis.  Incidental findings as noted above.               Relevant imaging results reviewed and interpreted by me, discussed with the patient and / or family today.  Assessment:     1. Lumbar radiculopathy    2. Dorsalgia, unspecified      Plan:     Lumbar radiculopathy    Dorsalgia, unspecified  -     MRI Lumbar Spine Without Contrast; Future; Expected date: 11/03/2020  -     X-Ray Lumbar Complete With Flex And Ext; Future; Expected date: 11/03/2020        The patient will get x-rays and MRI of lumbar spine for further evaluation, to look for nerve compression.  She can continue to take Aleve as needed, currently not interested in enrique relaxer, and use TENS and foam roller.  After these studies are completed, she will return to review and discuss tx options.      Jayson Thornton PA-C  [facility]

## 2020-11-05 ENCOUNTER — HOSPITAL ENCOUNTER (OUTPATIENT)
Dept: RADIOLOGY | Facility: HOSPITAL | Age: 50
Discharge: HOME | End: 2020-11-05
Attending: PHYSICIAN ASSISTANT
Payer: COMMERCIAL

## 2020-11-05 DIAGNOSIS — M54.9 DORSALGIA, UNSPECIFIED: ICD-10-CM

## 2020-11-05 PROCEDURE — 72148 MRI LUMBAR SPINE W/O DYE: CPT | Mod: 26,,, | Performed by: RADIOLOGY

## 2020-11-05 PROCEDURE — 72148 MRI LUMBAR SPINE W/O DYE: CPT | Mod: TC,PO

## 2020-11-05 PROCEDURE — 72148 MRI LUMBAR SPINE WITHOUT CONTRAST: ICD-10-PCS | Mod: 26,,, | Performed by: RADIOLOGY

## 2020-11-11 ENCOUNTER — OFFICE VISIT (OUTPATIENT)
Dept: NEUROSURGERY | Facility: CLINIC | Age: 50
End: 2020-11-11
Payer: COMMERCIAL

## 2020-11-11 VITALS
WEIGHT: 190.69 LBS | DIASTOLIC BLOOD PRESSURE: 85 MMHG | SYSTOLIC BLOOD PRESSURE: 132 MMHG | HEART RATE: 94 BPM | RESPIRATION RATE: 17 BRPM | BODY MASS INDEX: 31.77 KG/M2 | HEIGHT: 65 IN

## 2020-11-11 DIAGNOSIS — M54.16 LUMBAR RADICULOPATHY: Primary | ICD-10-CM

## 2020-11-11 DIAGNOSIS — M54.9 DORSALGIA, UNSPECIFIED: ICD-10-CM

## 2020-11-11 DIAGNOSIS — M51.36 DDD (DEGENERATIVE DISC DISEASE), LUMBAR: ICD-10-CM

## 2020-11-11 PROCEDURE — 99999 PR PBB SHADOW E&M-EST. PATIENT-LVL III: ICD-10-PCS | Mod: PBBFAC,,, | Performed by: PHYSICIAN ASSISTANT

## 2020-11-11 PROCEDURE — 99999 PR PBB SHADOW E&M-EST. PATIENT-LVL III: CPT | Mod: PBBFAC,,, | Performed by: PHYSICIAN ASSISTANT

## 2020-11-11 PROCEDURE — 99213 OFFICE O/P EST LOW 20 MIN: CPT | Mod: S$GLB,,, | Performed by: PHYSICIAN ASSISTANT

## 2020-11-11 PROCEDURE — 99213 PR OFFICE/OUTPT VISIT, EST, LEVL III, 20-29 MIN: ICD-10-PCS | Mod: S$GLB,,, | Performed by: PHYSICIAN ASSISTANT

## 2020-11-11 RX ORDER — DIETHYLPROPION HYDROCHLORIDE 75 MG/1
TABLET, EXTENDED RELEASE ORAL
Status: ON HOLD | COMMUNITY
End: 2020-11-23

## 2020-11-11 NOTE — PROGRESS NOTES
"Subjective:      Patient ID: Bing Mclaughlin is a 50 y.o. female.    Chief Complaint: Lumbar Spine Pain (L-Spine) (LBP radiating down lt leg)    HPI  The patient is here today for imaging follow-up.  Had recent MRI of lumbar spine for my review today.    Prev notes:     In 2017 she had L ovary, uterus, cervix removed.   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  After surgery her back pain improved however it is starting to get worse again.  Patient is concerned that it was scar tissue however CT chest/abd/pelvis did not show anything.  It was suggested to see NSG.  Her pain starts on left lower side of her back and it radiates "through" to the front at LLQ.  She does have pain on both sides of her back and hips.  The pain will radiate down her legs- she points mid thigh on both legs. The pain will shoot down her legs to her feet.  Tingling in dorsum of L foot- dorsum and second toe only.  No tingling of R foot.     Patient has completed physical therapy in the past. She has also performed HEP for months along with chiro treatment and dry needling.   Denies bladder/bowel changes.  Currently takes Aleve in AM and night time.   No previous back surgery or injections.  Rates her pain 6/10.    Recent DEXA showed no evidence of osteopenia/osteoporosis.     Past Medical History:   Diagnosis Date    Allergic rhinitis     Hypothyroid     Migraines      Past Surgical History:   Procedure Laterality Date    CERVICAL CERCLAGE      COLD KNIFE CONIZATION OF CERVIX      COLONOSCOPY N/A 6/5/2020    Procedure: COLONOSCOPY;  Surgeon: Camille Ridley MD;  Location: [facility] END[facility];  Service: Endoscopy;  Laterality: N/A;    HYSTERECTOMY      OOPHORECTOMY      left ovary    SELECTIVE INJECTION OF ANESTHETIC AGENT AROUND LUMBAR SPINAL NERVE ROOT BY TRANSFORAMINAL APPROACH Bilateral 11/23/2020    Procedure: Bilateral L2/3 TF JEANNETTE;  Surgeon: Bassam Hoffman MD;  Location: [facility]IN [facility]GT;  Service: Pain " Management;  Laterality: Bilateral;    TUBAL LIGATION      WRIST SURGERY      right, cyst removed.     Family History   Problem Relation Age of Onset    Uterine cancer Mother     Cancer Mother     Ovarian cancer Mother     Cancer Sister     Deep vein thrombosis Neg Hx      labor Neg Hx     Breast cancer Neg Hx      Social History     Socioeconomic History    Marital status:      Spouse name: Not on file    Number of children: Not on file    Years of education: Not on file    Highest education level: Not on file   Occupational History    Not on file   Social Needs    Financial resource strain: Not hard at all    Food insecurity     Worry: Never true     Inability: Never true    Transportation needs     Medical: No     Non-medical: No   Tobacco Use    Smoking status: Never Smoker    Smokeless tobacco: Never Used   Substance and Sexual Activity    Alcohol use: Yes     Alcohol/week: 0.0 standard drinks     Frequency: 2-4 times a month     Drinks per session: 1 or 2     Binge frequency: Never     Comment: occ    Drug use: No    Sexual activity: Yes     Partners: Male     Birth control/protection: Surgical     Comment: tubal   Lifestyle    Physical activity     Days per week: 1 day     Minutes per session: 40 min    Stress: Not at all   Relationships    Social connections     Talks on phone: More than three times a week     Gets together: More than three times a week     Attends Jain service: Not on file     Active member of club or organization: No     Attends meetings of clubs or organizations: Never     Relationship status:    Other Topics Concern    Not on file   Social History Narrative    Live w/ spouse      Medication List with Changes/Refills   Current Medications    ALBUTEROL (PROAIR HFA) 90 MCG/ACTUATION INHALER    ProAir HFA 90 mcg/actuation aerosol inhaler   Inhale 2 puffs every 4 hours by inhalation route.    FEXOFENADINE HCL (ALLEGRA ORAL)    Take 1 tablet  by mouth 2 (two) times a day.     HYDROCHLOROTHIAZIDE (HYDRODIURIL) 25 MG TABLET    hydrochlorothiazide 25 mg tablet   TAKE 1 TABLET BY MOUTH EVERY DAY IN THE MORNING    LEVOTHYROXINE (SYNTHROID) 150 MCG TABLET    levothyroxine 150 mcg tablet   Take 1 tablet every day by oral route in the morning for 30 days.    LIOTHYRONINE (CYTOMEL) 5 MCG TAB    liothyronine 5 mcg tablet   Take 1 tablet twice a day by oral route for 30 days.    MONTELUKAST (SINGULAIR) 10 MG TABLET    Take 10 mg by mouth every evening.    NAPROXEN SODIUM (ALEVE ORAL)    Take 1 tablet by mouth 2 (two) times a day.   Discontinued Medications    DIETHYLPROPION (TENUATE) 75 MG SR TABLET    diethylpropion ER 75 mg tablet,extended release   Take 1 tablet every day by oral route after meals for 30 days.    PHENTERMINE (ADIPEX-P) 37.5 MG TABLET    phentermine 37.5 mg tablet   Take 1 tablet every day by oral route.     Review of patient's allergies indicates:  No Known Allergies  ROS    Constitution: Negative for chills and decreased appetite.   HENT: Negative for congestion.    Eyes: Negative for blurred vision.   Cardiovascular: Negative for chest pain and claudication.   Respiratory: Negative for cough and hemoptysis.    Endocrine: Negative for cold intolerance.   Skin: Negative for color change and rash.   Musculoskeletal: Positive for back pain, joint pain, muscle cramps and myalgias. Negative for falls.   Gastrointestinal: Negative for abdominal pain.   Genitourinary: Negative for bladder incontinence.   Neurological: Positive for numbness. Negative for tremors.   Psychiatric/Behavioral: Negative for altered mental status.   Allergic/Immunologic: Negative for environmental allergies.      Objective:     Body mass index is 31.73 kg/m².  Vitals:    11/11/20 1521   BP: 132/85   Pulse: 94   Resp: 17          Nursing note and vitals reviewed  Gen:Oriented to person, place, and time.             Appears stated age   Skin: no rashes or lesions identified    Head:Normocephalic and atraumatic.  Nose: Nose normal.    Eyes: EOM are normal. Pupils are equal, round, and reactive to light.   Neck: Neck supple. No masses or lesions palpated  Cardiovascular: Intact distal pulses.    Abdominal: Soft.   Neurological: Alert and oriented to person, place, and time.  No cranial nerve deficit.  Coordination normal. Normal muscle tone  Psychiatric: Normal mood and affect. Behavior is normal.       Back:  None Present- left lower lumbar , LSIJ Paraspinal muscle spasms   None present Pain with flexion and extention   WNL limited Range of motion    Neg   Straight leg raise      Motor   Right Right Left Left  Level Group   5   5   L2 Hip flexor (Psoas)   5   5   L3 Leg extension (Quads)   5   5 4+ L4 Dorsiflexion & foot inversion (Tibialis Anterior)   5   5 4+ L5 Great toe extension ( EHL)   5   5   S1 Foot eversion (Gastroc, PL & PB)      Sensation  NL Decreased (R/L/BL) Level Sensation    X  L2 Anterio-medial thigh   X  L3 Medial thigh around knee   X  L4 Medial foot   X  L5 Dorsum foot   X  S1 Lateral foot     Reflex  2+  Patellar tendon (L4)   2+  Achilles tendon (S1)       Results for orders placed during the hospital encounter of 11/05/20   MRI Lumbar Spine Without Contrast    Narrative EXAMINATION:  MRI LUMBAR SPINE WITHOUT CONTRAST    CLINICAL HISTORY:  Back pain or radiculopathy, > 6 wks; Dorsalgia, unspecified    TECHNIQUE:  Multiplanar, multisequence MR images were acquired from the thoracolumbar junction to the sacrum without the administration of contrast.    COMPARISON:  Radiographs dated 11/03/2020    FINDINGS:  Alignment: Normal.    Vertebrae: Normal marrow signal. No fracture.    Discs: Moderate to severe disc height loss at L2-3.  Mild disc height loss at L1-2, L3-4, and L4-5.    Cord: Normal.  Conus terminates at L1    Degenerative findings:    T12-L1:  No spinal canal or neuroforaminal stenosis.    L1-L2: Mild generalized disc bulge. No spinal canal or neuroforaminal  stenosis.    L2-L3: Left paracentral disc protrusion superimposed upon more generalized disc bulge.  Mild bilateral facet arthropathy.  There is effacement of the left lateral recess with abutment of the the descending left L3 nerve root by disc material.  Otherwise no significant spinal canal stenosis.  Mild left neural foraminal narrowing.    L3-L4: Mild generalized disc bulge.  Mild bilateral facet arthropathy. No spinal canal or neuroforaminal stenosis.    L4-L5: Mild generalized disc bulge.  Mild bilateral facet arthropathy.  Mild right neural foraminal narrowing.  No spinal canal stenosis.    L5-S1: Mild generalized disc bulge.  Moderate bilateral facet arthropathy. No spinal canal or neuroforaminal stenosis.    Paraspinal muscles & soft tissues: Unremarkable.      Impression Multilevel degenerative disc disease and facet arthropathy as detailed above.  Findings are most severe at the level of L2-3.      Electronically signed by: Evaristo Marquez MD  Date:    11/05/2020  Time:    12:52      Results for orders placed during the hospital encounter of 11/03/20   X-Ray Lumbar Complete With Flex And Ext    Narrative EXAMINATION:  XR LUMBAR SPINE 5 VIEW WITH FLEX AND EXT    CLINICAL HISTORY:  Back pain or radiculopathy, > 6 wks;  Dorsalgia, unspecified    TECHNIQUE:  Five views of the lumbar spine plus flexion extension views were performed.    COMPARISON:  None.    FINDINGS:  Mild dextroscoliosis.  Vertebral body heights maintained without spondylolisthesis.  L2-3 disc height loss present with a anterior/posterior osteophyte changes.  Lower lumbar spine facet arthropathy present without pars defect.  Soft tissues unremarkable.    No significant change in alignment on flexion or extension views.      Impression Degenerative findings      Electronically signed by: Jose Flores MD  Date:    11/03/2020  Time:    11:06           Relevant imaging results reviewed and interpreted by me, discussed with the patient and  / or family today.  Assessment:     1. Lumbar radiculopathy    2. Dorsalgia, unspecified    3. DDD (degenerative disc disease), lumbar      Plan:     Lumbar radiculopathy    Dorsalgia, unspecified    DDD (degenerative disc disease), lumbar        Will send to pain management for TF JEANNETTE at L2/3- bilateral.  Patient in instructed to follow-up 2 weeks s/p injection for reassessment.      Jayson Thornton PA-C  [facility]

## 2020-11-12 ENCOUNTER — PATIENT MESSAGE (OUTPATIENT)
Dept: PAIN MEDICINE | Facility: CLINIC | Age: 50
End: 2020-11-12

## 2020-11-12 ENCOUNTER — TELEPHONE (OUTPATIENT)
Dept: PAIN MEDICINE | Facility: CLINIC | Age: 50
End: 2020-11-12

## 2020-11-12 DIAGNOSIS — M54.16 BILATERAL LUMBAR RADICULOPATHY: Primary | ICD-10-CM

## 2020-11-12 NOTE — TELEPHONE ENCOUNTER
----- Message from Renetta Araujo PA-C sent at 11/12/2020  8:13 AM CST -----  Regarding: RE: Injection  Orders put in for Dr. Hoffman  ----- Message -----  From: La Chen MA  Sent: 11/11/2020   4:14 PM CST  To: Renetta Araujo PA-C  Subject: FW: Injection                                    Can you please put orders in for procedure     Mendy Vargas  ----- Message -----  From: Jayson Thornton PA-C  Sent: 11/11/2020   3:46 PM CST  To: Lyn[facility]is Staff  Subject: Injection                                        Hello!    Is there any way that Dr. Haney or one of his partners is able to do her injection this month?    TF JEANNETTE at L2/3- bilateral    She'll need to follow-up 2 weeks afterwards w/ us.  She is not taking any blood thinners.    Thanks in advance!  Jayson

## 2020-11-16 NOTE — PRE-PROCEDURE INSTRUCTIONS
Spoke with patient regarding procedure scheduled on 11/23    Arrival time 1030    Has patient been sick with fever or on antibiotics within the last 7 days? No    Has patient received a vaccination within the last 7 days? no    Has the patient stopped all medications as directed? Naproxen on 11.15. Hold vitamins, supplements and other NSAIDS. Denies blood thinners.    Does patient have a pacemaker and or defibrillator? no    Does the patient have a ride to and from procedure and someone reliable to remain with patient?  [place]    Is the patient diabetic? no    Does the patient have sleep apnea? Or use O2 at home? No and no     Is the patient receiving sedation? yes    Is the patient instructed to remain NPO beginning at midnight the night before their procedure? yes    Procedure location confirmed with patient? Yes    Covid- Denies signs/symptoms. Instructed to notify PAT/MD if any changes.

## 2020-11-23 ENCOUNTER — HOSPITAL ENCOUNTER (OUTPATIENT)
Facility: HOSPITAL | Age: 50
Discharge: HOME | End: 2020-11-23
Attending: PAIN MEDICINE | Admitting: PAIN MEDICINE
Payer: COMMERCIAL

## 2020-11-23 VITALS
SYSTOLIC BLOOD PRESSURE: 132 MMHG | WEIGHT: 187.38 LBS | HEART RATE: 67 BPM | OXYGEN SATURATION: 98 % | RESPIRATION RATE: 16 BRPM | DIASTOLIC BLOOD PRESSURE: 62 MMHG | TEMPERATURE: 98 F | BODY MASS INDEX: 31.22 KG/M2 | HEIGHT: 65 IN

## 2020-11-23 DIAGNOSIS — M54.16 LUMBAR RADICULOPATHY: Primary | ICD-10-CM

## 2020-11-23 PROCEDURE — 25000003 PHARM REV CODE 250: Performed by: PAIN MEDICINE

## 2020-11-23 PROCEDURE — 64483 NJX AA&/STRD TFRM EPI L/S 1: CPT | Mod: 50 | Performed by: PAIN MEDICINE

## 2020-11-23 PROCEDURE — 64483 PR EPIDURAL INJ, ANES/STEROID, TRANSFORAMINAL, LUMB/SACR, SNGL LEVL: ICD-10-PCS | Mod: 50,,, | Performed by: PAIN MEDICINE

## 2020-11-23 PROCEDURE — 64483 NJX AA&/STRD TFRM EPI L/S 1: CPT | Mod: 50,,, | Performed by: PAIN MEDICINE

## 2020-11-23 PROCEDURE — 63600175 PHARM REV CODE 636 W HCPCS: Performed by: PAIN MEDICINE

## 2020-11-23 PROCEDURE — 25500020 PHARM REV CODE 255: Performed by: PAIN MEDICINE

## 2020-11-23 RX ORDER — MIDAZOLAM HYDROCHLORIDE 1 MG/ML
INJECTION, SOLUTION INTRAMUSCULAR; INTRAVENOUS
Status: DISCONTINUED | OUTPATIENT
Start: 2020-11-23 | End: 2020-11-23 | Stop reason: HOSPADM

## 2020-11-23 RX ORDER — DEXAMETHASONE SODIUM PHOSPHATE 10 MG/ML
INJECTION INTRAMUSCULAR; INTRAVENOUS
Status: DISCONTINUED | OUTPATIENT
Start: 2020-11-23 | End: 2020-11-23 | Stop reason: HOSPADM

## 2020-11-23 RX ORDER — FENTANYL CITRATE 50 UG/ML
INJECTION, SOLUTION INTRAMUSCULAR; INTRAVENOUS
Status: DISCONTINUED | OUTPATIENT
Start: 2020-11-23 | End: 2020-11-23 | Stop reason: HOSPADM

## 2020-11-23 RX ORDER — LIDOCAINE HYDROCHLORIDE 10 MG/ML
INJECTION, SOLUTION EPIDURAL; INFILTRATION; INTRACAUDAL; PERINEURAL
Status: DISCONTINUED | OUTPATIENT
Start: 2020-11-23 | End: 2020-11-23 | Stop reason: HOSPADM

## 2020-11-23 NOTE — H&P (VIEW-ONLY)
Chief Pain Complaint:  Low back and leg pain    Referring Provider  Dr. Bassam Hoffman    This note was created using voice recognition, there may be errors that were missed during proofreading.    History of Present Illness:   This patient is a 50 y.o. female who presents today complaining of the above noted pain/s. The patient describes the pain as follows.  Ms. Mclaughlin is a new patient that was a direct referral from neurosurgery for injection.  She has a lumbar MRI which shows disc protrusion at L2/3 which is abutting the left L3 nerve root.  She has been having pain in the left leg to the knee and some discomfort in the right hip.  She has not participated in physical therapy at this point and denies having had surgery.  She has tried medications which have not been overly helpful.    Previous Therapy:  Medications: tylenol, fioricet, toradol, mobic, medrol dose pack, naproxen  Injections: None  Surgeries: None  Physical Therapy: None    Past Surgical History:   Procedure Laterality Date    CERVICAL CERCLAGE      COLD KNIFE CONIZATION OF CERVIX      COLONOSCOPY N/A 2020    Procedure: COLONOSCOPY;  Surgeon: Camille Ridley MD;  Location[facility];  Service: Endoscopy;  Laterality: N/A;    HYSTERECTOMY      OOPHORECTOMY      left ovary    TUBAL LIGATION      WRIST SURGERY      right, cyst removed.     Family History   Problem Relation Age of Onset    Uterine cancer Mother     Cancer Mother     Ovarian cancer Mother     Cancer Sister     Deep vein thrombosis Neg Hx      labor Neg Hx     Breast cancer Neg Hx      Social History     Socioeconomic History    Marital status:      Spouse name: Not on file    Number of children: Not on file    Years of education: Not on file    Highest education level: Not on file   Occupational History    Not on file   Social Needs    Financial resource strain: Not hard at all    Food insecurity     Worry: Never true     Inability: Never  true    Transportation needs     Medical: No     Non-medical: No   Tobacco Use    Smoking status: Never Smoker    Smokeless tobacco: Never Used   Substance and Sexual Activity    Alcohol use: Yes     Alcohol/week: 0.0 standard drinks     Frequency: 2-4 times a month     Drinks per session: 1 or 2     Binge frequency: Never     Comment: occ    Drug use: No    Sexual activity: Yes     Partners: Male     Birth control/protection: Surgical     Comment: tubal   Lifestyle    Physical activity     Days per week: 1 day     Minutes per session: 40 min    Stress: Not at all   Relationships    Social connections     Talks on phone: More than three times a week     Gets together: More than three times a week     Attends Jew service: Not on file     Active member of club or organization: No     Attends meetings of clubs or organizations: Never     Relationship status:    Other Topics Concern    Not on file   Social History Narrative    Live w/ spouse       Imaging / Labs / Studies (reviewed on 11/23/2020):  Results for orders placed during the hospital encounter of 11/05/20   MRI Lumbar Spine Without Contrast    Narrative EXAMINATION:  MRI LUMBAR SPINE WITHOUT CONTRAST    CLINICAL HISTORY:  Back pain or radiculopathy, > 6 wks; Dorsalgia, unspecified    TECHNIQUE:  Multiplanar, multisequence MR images were acquired from the thoracolumbar junction to the sacrum without the administration of contrast.    COMPARISON:  Radiographs dated 11/03/2020    FINDINGS:  Alignment: Normal.    Vertebrae: Normal marrow signal. No fracture.    Discs: Moderate to severe disc height loss at L2-3.  Mild disc height loss at L1-2, L3-4, and L4-5.    Cord: Normal.  Conus terminates at L1    Degenerative findings:    T12-L1:  No spinal canal or neuroforaminal stenosis.    L1-L2: Mild generalized disc bulge. No spinal canal or neuroforaminal stenosis.    L2-L3: Left paracentral disc protrusion superimposed upon more generalized  disc bulge.  Mild bilateral facet arthropathy.  There is effacement of the left lateral recess with abutment of the the descending left L3 nerve root by disc material.  Otherwise no significant spinal canal stenosis.  Mild left neural foraminal narrowing.    L3-L4: Mild generalized disc bulge.  Mild bilateral facet arthropathy. No spinal canal or neuroforaminal stenosis.    L4-L5: Mild generalized disc bulge.  Mild bilateral facet arthropathy.  Mild right neural foraminal narrowing.  No spinal canal stenosis.    L5-S1: Mild generalized disc bulge.  Moderate bilateral facet arthropathy. No spinal canal or neuroforaminal stenosis.    Paraspinal muscles & soft tissues: Unremarkable.      Impression Multilevel degenerative disc disease and facet arthropathy as detailed above.  Findings are most severe at the level of L2-3     Results for orders placed during the hospital encounter of 11/03/20   X-Ray Lumbar Complete With Flex And Ext    Narrative EXAMINATION:  XR LUMBAR SPINE 5 VIEW WITH FLEX AND EXT    CLINICAL HISTORY:  Back pain or radiculopathy, > 6 wks;  Dorsalgia, unspecified    TECHNIQUE:  Five views of the lumbar spine plus flexion extension views were performed.    COMPARISON:  None.    FINDINGS:  Mild dextroscoliosis.  Vertebral body heights maintained without spondylolisthesis.  L2-3 disc height loss present with a anterior/posterior osteophyte changes.  Lower lumbar spine facet arthropathy present without pars defect.  Soft tissues unremarkable.    No significant change in alignment on flexion or extension views.      Impression Degenerative findings     Review of Systems:  Review of Systems   Constitutional: Negative for fever.   Eyes: Negative for blurred vision.   Respiratory: Negative for cough and wheezing.    Cardiovascular: Negative for chest pain and orthopnea.   Gastrointestinal: Negative for constipation, diarrhea, nausea and vomiting.   Genitourinary: Negative for dysuria.   Musculoskeletal:  "Positive for back pain.   Skin: Negative for itching and rash.   Neurological: Positive for weakness.   Endo/Heme/Allergies: Does not bruise/bleed easily.       Physical Exam:  /61 (BP Location: Right arm, Patient Position: Sitting)   Pulse 66   Temp 97.9 °F (36.6 °C) (Temporal)   Resp 17   Ht 5' 5" (1.651 m)   Wt 85 kg (187 lb 6.3 oz)   LMP 2017   SpO2 100%   Breastfeeding No   BMI 31.18 kg/m²  (reviewed on 2020)\  General    Constitutional: She is oriented to person, place, and time. She appears well-developed and well-nourished.   HENT:   Head: Normocephalic and atraumatic.   Eyes: EOM are normal.   Neck: Neck supple.   Cardiovascular:   No peripheral edema   Pulmonary/Chest: Effort normal.   Abdominal: She exhibits no distension.   Neurological: She is alert and oriented to person, place, and time. No cranial nerve deficit.   Psychiatric: She has a normal mood and affect.         Back (L-Spine & T-Spine) / Neck (C-Spine) Exam     Comments:  Straight leg raise positive on the left, mildly positive on the right; decreased sensation to light touch in left leg; normal sensation in the right lower extremity      Muscle Strength   Right Lower Extremity   Hip Flexion: 5/5   Quadriceps:  5/5   Hamstrin/5   EHL:  5/5  Left Lower Extremity   Hip Flexion: 4/5   Quadriceps:  4/5   Hamstrin/5   EHL:  4/5      Assessment  Lumbar Radiculopathy  Lumbar disc protrusion    1. 50 y.o. year old patient with PMH of   Past Medical History:   Diagnosis Date    Allergic rhinitis     Hypothyroid     Migraines       presenting with pain located bilateral legs  2. Pain Generators / Etiology: Lumbar Radiculopathy, lumbar disc protrusion  3. Failed Meds (E- Effective, NE- Not Effective): toradol and medrol dose pack minimally helpful  4. Physical Therapy - None  5. Psychological comorbidities - None  6. Anticoagulants / Antiplatelets: None     PLAN:  1. Medications: continue all medications as " prescribed   2. PT - will consider physical therapy in the future  3. Psychological - none  4. Labs - obtain  none  5. Imaging - obtain  None; reviewed lumbar MRI  6. Interventions - schedule bilateral L2/3 TFESI  7. Referrals - none  8. Records - none  9. Follow up visit - follow up with neurosurgery after injection  10. Patient Questions - answered all of the patient's questions regarding diagnosis, therapy, and treatment  11.  This condition does not require this patient to take time off of work      TIFFANIE Hoffman MD  Interventional Pain  [facility] [place]

## 2020-11-23 NOTE — OP NOTE
"Procedure: Lumbar Transforaminal Epidural Steroid Injection under Fluoroscopic Guidance (supraneural approach)    Level: L2/3     Side: Bilateral    PROCEDURE DATE: 11/23/2020    Pre-operative Diagnosis: Lumbar Radiculopathy  Post-operative Diagnosis: Lumbar Radiculopathy    Provider: TIFFANIE Hoffman MD  Assistant(s): None    Anesthesia: Local, IV Sedation    >> 1 mg of VERSED    >> 50 mcg of FENTANYL     Indication: Low back pain with radiculopathy consistent with distribution of targeted nerve. Symptoms unresponsive to conservative treatments. Fluoroscopy was used to optimize visualization of needle placement and to maximize safety.     Procedure Description / Technique:  The patient was seen and identified in the preoperative area. Risks, benefits, complications, and alternatives were discussed with the patient. The patient agreed to proceed with the procedure and signed the consent. The site and side of the procedure was identified and marked. An IV was started. The patient was taken to the procedural suite.    The patient was positioned in prone orientation on procedure table and a pillow was placed under the abdomen to reduce lumbar lordosis. A time out was performed prior to any intervention. The procedure, site, side, and allergies were stated and agreed to by all present. The lumbosacral area was widely prepped with ChloraPrep. The procedural site was draped in usual sterile fashion. Vital signs were closely monitored throughout this procedure. Conscious sedation was used for this procedure to decrease patient anxiety.    The target area was visualized under fluoroscopy. The cephalocaudal angle of the fluoroscope was adjusted as to align the vertebral end plates. The fluoroscopic arm was rotated ipsilaterally to an angle of approximately 30 degrees until the "gorge dog" outline came into view and the tip of the inferior superior articular process pointed towards the midline, 6:00 position of the above " "pedicle. A 25 gauge 3.5 inch spinal needle was directed towards the "chin" of the "gorge dog" (adjacent to the pars interarticularis and inferior to the pedicle). The needle was advanced until OS was met at the inferior border of the pedicle / pars interface. The needle was adjusted so that it would pass inferior to the osseous border. The fluoroscope was then placed in the lateral position and the needle was slowly advanced until it rested in the posterior 1/3rd of the vertebral foramen. AP fluoroscopy was checked and the needle tip rested at the 6:00 position under the pedicle. No paresthesia was elicited during needle placement. With the needle tip in its final position, gentle aspiration was negative for blood and CSF. Omnipaque 240 (1 to 2 mL) was injected under live fluoroscopy. Microbore tubing was used for injection. There was no pain or paresthesia on injection. The contrast clearly delineated the targeted nerve root on AP fluoroscopy. No vascular uptake was seen. A solution containing 2 mL of 1% PF Lidocaine and 2 mL of Dexamethasone (10 mg/mL) was mixed and 2 mL was injected slowly at each level targeted. There was minimal resistance on injection. No pain or paresthesia was elicited on injection. The stylet was replaced and the needle was withdrawn intact. This procedure was performed for each of the above indicated levels.     Description of Findings: Not applicable    Prosthetic devices, grafts, tissues, or devices implanted: None    Specimen Removed: No    Estimated Blood Loss: minimal    COMPLICATIONS: None    DISPOSITION / PLANS: The patient was transferred to the recovery area in a stable condition for observation. The patient was reexamined prior to discharge. There was no evidence of acute neurologic injury following the procedure.  Patient was discharged from the recovery room after meeting discharge criteria. Home discharge instructions were given to the patient by the staff.  "

## 2020-11-23 NOTE — PLAN OF CARE
"Integrative Health Follow Up Visit  9/21/2017  Araceli Sellers  [id number]    Reason for Visit:  Constipation problems, bloating, difficulty with finding foods that agree w/ her.     Interval History:  Joanna has had some significat problems with constipation the last few months, prompting a visit with Dr. Musa, colonoscopy w/ [facility] which was \"normal\", starting back on Miralax, trying Lubiprostone (now stopped both), and then trying to go with herbal/non-pharmaceutical options. She has landed on Triphala (Australian herbal fruits: harada, amla, and behada) which she feels is helping and the last 2 days she has had easier, better BMs than ever w/ Miralax. No abd pain, quite a bit of bloating, more reflux than in past. She has had confusion over what is GF, if she needs celiac testing, and how to craft her diet around wise food choices, time constraints and budget.     Diet: Veronique has been eating a cereal of some sort for breakfast - multigrain cheerios w/ rice milk or SCO's w/ cinnamon, fruit and rice milk. Lunch - rice or SP, tofu, hard boiled eggs, cabbage/kale (cooked). Dinner -veg burger, eggs, fish. Minimal beans/lentils w/ bowel problems the last few months. Has generally tried to stay GF and dairy-free, but found some of her supplements (magnesium, melatonin) had wheat in them. She also learned that soy sauce often has wheat. Thus, not totally GF for the last few months until Aug 3rd - until now, as far as she knows. She has stopped drinking coffee the last 2 days and consuming more herbal teas. Due to the reflux, she started taking ranitidine again and is still using it most AM and PMs.    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 Did not discuss in detail.   Sleep: has been bad - couldn't stay asleep or fall asleep a few weeks ago. Better of late. Switched her melatonin supplement to one w/o wheat, better quality. Doing better. Using 5-HTP for sleep as well.   Elimination: as above. Stools " Pt verbalized understanding of discharge instructions. Bandaids x 2 to lower back c/d/i. Patient voiced no complaints, with no further questions at this time. Patient stood at side of bed, walked steps with no new motor deficits. No HA or nausea noted. VSS on RA. Recovery care complete.    sometimes do have undigested food in them still.   Stress/Emotional: admits to stress being relatively high, mainly around schedule, her GI issues, work/school.     ROS: a focused ROS was performed and negative other than above and:  - Menses ?5 week cycle, just got period yesterday. Minimal cramping.   - acne better  - no other skin rashes    Changes to PMH/SH/FH:  Above.   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     Medications:     Current Outpatient Prescriptions   Medication     Lubiprostone 8 MCG CAPS capsule     MELATONIN     mometasone (NASONEX) 50 MCG/ACT nasal spray     No current facility-administered medications for this visit.    not taking celexa, lubiprostone, or miralax.    Started ranitidine again - in AM and PM due to reflux, still taking it.    Supplements: Triphala, 5-HTP, melatonin, fish oil, B complex, VitD, Magnesium (powder - calm - around 800mg), bromelain supplement also.   Did probiotic for a spell several months ago. Stopped after running out.     Physical Exam:  Resp. rate 16, weight 68 kg (150 lb), not currently breastfeeding.   LMP - yesterday started. Maybe 5 weeks since   Wt Readings from Last 3 Encounters:   09/21/17 68 kg (150 lb)   06/13/17 64.9 kg (143 lb)   05/31/17 67.2 kg (148 lb 1.6 oz)     Body mass index is 20.63 kg/(m^2).  Well appearing female, no distress.   Affect variable, no signs of anxiety or depression, nor reported sx.   Tongue thin at tip, no significant coating. Post OP w/o lesions or significant irritation/erythema.   Abdomen flat, no masses or pain on deep palpation.   Skin clear w/o significant acne, no rashes. Very tanned/brown skin tone.     Labs/Data since last visit:  H.pylori negative from stool specimen in Sept 2017  Colonoscopy - reviewed results from [facility]. Impression: Constipation, unspecified type; hematochezia.       Assessment & Plan:  Joanna Stratton) is a 23 year old yr old female seen in follow  "up today for various GI issues and questions about dietary approaches.   1. Chronic constipation, reflux and bloating - r/t intestinal dysbiosis, magnesium deficiency, hx trauma (not discussed today), dietary factors, stress. May have SIBO, but too many variables present at this point to say and I believe she had a breath-test offered but declined. Regardless, pt's current regimen seems to be improving her sx: GF and DF diet (x 6 weeks now), bromelain (digestive enzyme support), Triphala (for fiber and digestive motility/cleansing), switching back to Magnesium in form of \"Calm\". Suggested reducing simple CHOs (cereals) and making breakfast more prot/fat, saving the complex CHOs for later in the day to help w/ steady glucose for evening/sleep. Specific other recs below. Discussed in detail w/ pt. Also recommend reducing zantac if possible, trying the other options below.     2. Disordered sleep - improving w/ 5-HTP, melatonin. Likely r/t stress, bowels, ?wheat in melatonin. Less anxiety recently, improved. Will discuss additional mind/body approaches in future if still a problem. Magnesium/Calm formula will be helpful for regular sleep also.     3. Oligomenorrhea - possible. Pt not sure how frequently her menses occur, but guessed around 5 weeks. Discuss further at future visit.     4. Anxiety, hx trauma - not discussed (the trauma part). Pt doing well off celexa. Continuing Mag, B-complex, Vit D, fish oil, all helpful for anxiety.  Counseling resources available at [facility] if desired.       Recommendations:  Large glass of water w/ 1 Sanam of lemon juice and ACV with the mother - first thing in the morning, helps stimulate digestion.     For breakfast - fat and protein. Eggs OR angie-hemp-flax porridge w/ nuts, nut butter, nut milk.   Rice - limiting to 3 sv/week  carbs - making sure they are complex/whole grain, GF (millet, quinoa, wild rice) save for later in day - lunch/dinner.    Carolyn tea - good for nausea, warming, " pro-motility.   Donnell greens, mustard greens, kale, spinach, chard... All wonderful - saute or in soup.  Consider adding in more plant proteins, clean animal proteins.     Dr. Marilu Martinez - website, has recipes and such.       Follow Up:  In 6 weeks or so; MyChart in the interim prn.     Time spent in visit: 55 minutes face to face, > 50% spent in education, counseling and coordination of care around the above issues. Recipe provided as well as resources on further reading for above.      Myriam Hendrickson MD ([place]), FAAP, FACP  Integrative Medicine Fellow Class of 2017  Internal Medicine/Pediatrics Staff Physician   of Internal Medicine and Pediatrics  [facility]  Pager: [phone]  Email: [email or web address]

## 2020-11-23 NOTE — DISCHARGE INSTRUCTIONS
1. Side effects may include: headache, restlessness at night, weakness to upper or lower extremities (arms or legs), flushing of the face and/ or neck. None are life threatening and will subside within 2-3 days.   2. If you have SEVERE headache with nausea and extreme pain, please call office ([phone]). Unless you usually have this type of migraine headache.   3. If you develop fever (greater than 101 F) or have any redness, swelling, or drainage at the injection site(s), please call off ([phone]). This may be a sign of infection.   4. If a rash or whelps (like hives) occur, please call the office ([phone]).  5. If you are a heart patient, please watch for symptoms such as swelling in your hands and feet and shortness of breath. If any of these symptoms occur, please notify your primary care/ heart doctor and go to the nearest emergency room.  6. If you have high blood pressure, you may experience an increase in your blood pressure over the next two weeks. Please continue to monitor your blood pressure and contact your primary care provider if your blood pressure does not return to baseline.    7. If you are a diabetic or you monitor your blood sugar, you may have an increase in your blood sugar over the next two weeks. If your blood sugar does not return to your baseline, please contact your primary care provider.  8. NO HEAT TO THE INJECTION SITE for the next 24 hours including: bath or shower, heating pad, moist heat, and / or hot tubs.   9. May use ice pack to injection site for any pain or discomfort. Apply ice pack for 20 minutes then remove for 20 minutes before re- applying to site. (recipe for homemade frozen gel pack below)  10. DO NOT DRIVE OR OPERATE HEAVY MACHINERY UNTIL TOMORROW MORNING.   11. OK to resume all medications unless otherwise directed by your doctor.  12. Injection(s) may take up to two weeks until full effects are noted.  13. You may return to normal activity/ work the  following day.  14. Please do not receive a flu or pneumonia vaccine until one week after your procedure.  .  Homemade Frozen Gel Ice Pack:  1 bottle of rubbing alcohol  3 bottles of water (use rubbing alcohol bottle to measure)  2 large zip lock bags    Instructions:  1. Pour alcohol and water into zip lock bag. Make sure bag is large enough to allow for expansion.  2. Try to get as much air out of the freezer bag before sealing it shut.   3. Place the bag and its contents inside a second freezer bag to contain any leakage.   4. Leave the bag in the freezer for at least one hour.  5. When it's ready, place a towel between the gel pack and bare skin to avoid burning the skin.

## 2020-11-23 NOTE — H&P
Chief Pain Complaint:  Low back and leg pain    Referring Provider  Dr. Bassam Hoffman    This note was created using voice recognition, there may be errors that were missed during proofreading.    History of Present Illness:   This patient is a 50 y.o. female who presents today complaining of the above noted pain/s. The patient describes the pain as follows.  Ms. Mclaughlin is a new patient that was a direct referral from neurosurgery for injection.  She has a lumbar MRI which shows disc protrusion at L2/3 which is abutting the left L3 nerve root.  She has been having pain in the left leg to the knee and some discomfort in the right hip.  She has not participated in physical therapy at this point and denies having had surgery.  She has tried medications which have not been overly helpful.    Previous Therapy:  Medications: tylenol, fioricet, toradol, mobic, medrol dose pack, naproxen  Injections: None  Surgeries: None  Physical Therapy: None    Past Surgical History:   Procedure Laterality Date    CERVICAL CERCLAGE      COLD KNIFE CONIZATION OF CERVIX      COLONOSCOPY N/A 2020    Procedure: COLONOSCOPY;  Surgeon: Camille Ridley MD;  [facility];  Service: Endoscopy;  Laterality: N/A;    HYSTERECTOMY      OOPHORECTOMY      left ovary    TUBAL LIGATION      WRIST SURGERY      right, cyst removed.     Family History   Problem Relation Age of Onset    Uterine cancer Mother     Cancer Mother     Ovarian cancer Mother     Cancer Sister     Deep vein thrombosis Neg Hx      labor Neg Hx     Breast cancer Neg Hx      Social History     Socioeconomic History    Marital status:      Spouse name: Not on file    Number of children: Not on file    Years of education: Not on file    Highest education level: Not on file   Occupational History    Not on file   Social Needs    Financial resource strain: Not hard at all    Food insecurity     Worry: Never true     Inability: Never  true    Transportation needs     Medical: No     Non-medical: No   Tobacco Use    Smoking status: Never Smoker    Smokeless tobacco: Never Used   Substance and Sexual Activity    Alcohol use: Yes     Alcohol/week: 0.0 standard drinks     Frequency: 2-4 times a month     Drinks per session: 1 or 2     Binge frequency: Never     Comment: occ    Drug use: No    Sexual activity: Yes     Partners: Male     Birth control/protection: Surgical     Comment: tubal   Lifestyle    Physical activity     Days per week: 1 day     Minutes per session: 40 min    Stress: Not at all   Relationships    Social connections     Talks on phone: More than three times a week     Gets together: More than three times a week     Attends Jehovah's witness service: Not on file     Active member of club or organization: No     Attends meetings of clubs or organizations: Never     Relationship status:    Other Topics Concern    Not on file   Social History Narrative    Live w/ spouse       Imaging / Labs / Studies (reviewed on 11/23/2020):  Results for orders placed during the hospital encounter of 11/05/20   MRI Lumbar Spine Without Contrast    Narrative EXAMINATION:  MRI LUMBAR SPINE WITHOUT CONTRAST    CLINICAL HISTORY:  Back pain or radiculopathy, > 6 wks; Dorsalgia, unspecified    TECHNIQUE:  Multiplanar, multisequence MR images were acquired from the thoracolumbar junction to the sacrum without the administration of contrast.    COMPARISON:  Radiographs dated 11/03/2020    FINDINGS:  Alignment: Normal.    Vertebrae: Normal marrow signal. No fracture.    Discs: Moderate to severe disc height loss at L2-3.  Mild disc height loss at L1-2, L3-4, and L4-5.    Cord: Normal.  Conus terminates at L1    Degenerative findings:    T12-L1:  No spinal canal or neuroforaminal stenosis.    L1-L2: Mild generalized disc bulge. No spinal canal or neuroforaminal stenosis.    L2-L3: Left paracentral disc protrusion superimposed upon more generalized  disc bulge.  Mild bilateral facet arthropathy.  There is effacement of the left lateral recess with abutment of the the descending left L3 nerve root by disc material.  Otherwise no significant spinal canal stenosis.  Mild left neural foraminal narrowing.    L3-L4: Mild generalized disc bulge.  Mild bilateral facet arthropathy. No spinal canal or neuroforaminal stenosis.    L4-L5: Mild generalized disc bulge.  Mild bilateral facet arthropathy.  Mild right neural foraminal narrowing.  No spinal canal stenosis.    L5-S1: Mild generalized disc bulge.  Moderate bilateral facet arthropathy. No spinal canal or neuroforaminal stenosis.    Paraspinal muscles & soft tissues: Unremarkable.      Impression Multilevel degenerative disc disease and facet arthropathy as detailed above.  Findings are most severe at the level of L2-3     Results for orders placed during the hospital encounter of 11/03/20   X-Ray Lumbar Complete With Flex And Ext    Narrative EXAMINATION:  XR LUMBAR SPINE 5 VIEW WITH FLEX AND EXT    CLINICAL HISTORY:  Back pain or radiculopathy, > 6 wks;  Dorsalgia, unspecified    TECHNIQUE:  Five views of the lumbar spine plus flexion extension views were performed.    COMPARISON:  None.    FINDINGS:  Mild dextroscoliosis.  Vertebral body heights maintained without spondylolisthesis.  L2-3 disc height loss present with a anterior/posterior osteophyte changes.  Lower lumbar spine facet arthropathy present without pars defect.  Soft tissues unremarkable.    No significant change in alignment on flexion or extension views.      Impression Degenerative findings     Review of Systems:  Review of Systems   Constitutional: Negative for fever.   Eyes: Negative for blurred vision.   Respiratory: Negative for cough and wheezing.    Cardiovascular: Negative for chest pain and orthopnea.   Gastrointestinal: Negative for constipation, diarrhea, nausea and vomiting.   Genitourinary: Negative for dysuria.   Musculoskeletal:  "Positive for back pain.   Skin: Negative for itching and rash.   Neurological: Positive for weakness.   Endo/Heme/Allergies: Does not bruise/bleed easily.       Physical Exam:  /61 (BP Location: Right arm, Patient Position: Sitting)   Pulse 66   Temp 97.9 °F (36.6 °C) (Temporal)   Resp 17   Ht 5' 5" (1.651 m)   Wt 85 kg (187 lb 6.3 oz)   LMP 2017   SpO2 100%   Breastfeeding No   BMI 31.18 kg/m²  (reviewed on 2020)\  General    Constitutional: She is oriented to person, place, and time. She appears well-developed and well-nourished.   HENT:   Head: Normocephalic and atraumatic.   Eyes: EOM are normal.   Neck: Neck supple.   Cardiovascular:   No peripheral edema   Pulmonary/Chest: Effort normal.   Abdominal: She exhibits no distension.   Neurological: She is alert and oriented to person, place, and time. No cranial nerve deficit.   Psychiatric: She has a normal mood and affect.         Back (L-Spine & T-Spine) / Neck (C-Spine) Exam     Comments:  Straight leg raise positive on the left, mildly positive on the right; decreased sensation to light touch in left leg; normal sensation in the right lower extremity      Muscle Strength   Right Lower Extremity   Hip Flexion: 5/5   Quadriceps:  5/5   Hamstrin/5   EHL:  5/5  Left Lower Extremity   Hip Flexion: 4/5   Quadriceps:  4/5   Hamstrin/5   EHL:  4/5      Assessment  Lumbar Radiculopathy  Lumbar disc protrusion    1. 50 y.o. year old patient with PMH of   Past Medical History:   Diagnosis Date    Allergic rhinitis     Hypothyroid     Migraines       presenting with pain located bilateral legs  2. Pain Generators / Etiology: Lumbar Radiculopathy, lumbar disc protrusion  3. Failed Meds (E- Effective, NE- Not Effective): toradol and medrol dose pack minimally helpful  4. Physical Therapy - None  5. Psychological comorbidities - None  6. Anticoagulants / Antiplatelets: None     PLAN:  1. Medications: continue all medications as " prescribed   2. PT - will consider physical therapy in the future  3. Psychological - none  4. Labs - obtain  none  5. Imaging - obtain  None; reviewed lumbar MRI  6. Interventions - schedule bilateral L2/3 TFESI  7. Referrals - none  8. Records - none  9. Follow up visit - follow up with neurosurgery after injection  10. Patient Questions - answered all of the patient's questions regarding diagnosis, therapy, and treatment  11.  This condition does not require this patient to take time off of work      TIFFANIE Hoffman MD  Interventional Pain  [facility] [place]

## 2020-11-24 NOTE — DISCHARGE SUMMARY
[facility]  Short Stay  Discharge Summary    Admit Date: 11/23/2020    Discharge Date and Time: 11/23/2020  1:37 PM      Discharge Attending Physician: TIFFANIE Hoffman MD     Hospital Course (synopsis of major diagnoses, care, treatment, and services provided during the course of the hospital stay): Patient was admitted to Pre-op where informed consent was signed.  The patient was then taken to the procedure suite where the procedure was performed.  The patient was then return to the Pre-Op area and discharge was performed.     Final Diagnoses:    Principal Problem: <principal problem not specified>   Secondary Diagnoses:   Active Hospital Problems    Diagnosis  POA    Lumbar radiculopathy [M54.16]  Yes      Resolved Hospital Problems   No resolved problems to display.       Discharged Condition: good    Disposition: Home or Self Care    Follow up/Patient Instructions:    Medications:  Reconciled Home Medications:      Medication List      ASK your doctor about these medications    ALEVE ORAL  Take 1 tablet by mouth 2 (two) times a day.     ALLEGRA ORAL  Take 1 tablet by mouth 2 (two) times a day.     hydroCHLOROthiazide 25 MG tablet  Commonly known as: HYDRODIURIL  hydrochlorothiazide 25 mg tablet   TAKE 1 TABLET BY MOUTH EVERY DAY IN THE MORNING     levothyroxine 150 MCG tablet  Commonly known as: SYNTHROID  levothyroxine 150 mcg tablet   Take 1 tablet every day by oral route in the morning for 30 days.     liothyronine 5 MCG Tab  Commonly known as: CYTOMEL  liothyronine 5 mcg tablet   Take 1 tablet twice a day by oral route for 30 days.     montelukast 10 mg tablet  Commonly known as: SINGULAIR  Take 10 mg by mouth every evening.     PROAIR HFA 90 mcg/actuation inhaler  Generic drug: albuterol  ProAir HFA 90 mcg/actuation aerosol inhaler   Inhale 2 puffs every 4 hours by inhalation route.          No discharge procedures on file.

## 2020-12-08 ENCOUNTER — OFFICE VISIT (OUTPATIENT)
Dept: NEUROSURGERY | Facility: CLINIC | Age: 50
End: 2020-12-08
Payer: COMMERCIAL

## 2020-12-08 VITALS
DIASTOLIC BLOOD PRESSURE: 86 MMHG | BODY MASS INDEX: 31.77 KG/M2 | RESPIRATION RATE: 16 BRPM | HEART RATE: 106 BPM | SYSTOLIC BLOOD PRESSURE: 148 MMHG | WEIGHT: 190.69 LBS | HEIGHT: 65 IN

## 2020-12-08 DIAGNOSIS — M51.36 DDD (DEGENERATIVE DISC DISEASE), LUMBAR: ICD-10-CM

## 2020-12-08 DIAGNOSIS — M54.16 LUMBAR RADICULOPATHY: Primary | ICD-10-CM

## 2020-12-08 PROCEDURE — 99999 PR PBB SHADOW E&M-EST. PATIENT-LVL III: ICD-10-PCS | Mod: PBBFAC,,, | Performed by: PHYSICIAN ASSISTANT

## 2020-12-08 PROCEDURE — 99999 PR PBB SHADOW E&M-EST. PATIENT-LVL III: CPT | Mod: PBBFAC,,, | Performed by: PHYSICIAN ASSISTANT

## 2020-12-08 PROCEDURE — 99213 PR OFFICE/OUTPT VISIT, EST, LEVL III, 20-29 MIN: ICD-10-PCS | Mod: S$GLB,,, | Performed by: PHYSICIAN ASSISTANT

## 2020-12-08 PROCEDURE — 99213 OFFICE O/P EST LOW 20 MIN: CPT | Mod: S$GLB,,, | Performed by: PHYSICIAN ASSISTANT

## 2020-12-08 NOTE — PROGRESS NOTES
Subjective:      Patient ID: Bing Mclaughlin is a 50 y.o. female.    Chief Complaint: Follow-up (f/u after injections)    HPI  The patient is here today for follow-up regarding back pain.  She is s/p Bilateral L2/3 TF JEANNETTE with Dr. Hoffman from 2020.    Patient reports about 50% relief. Prior to the injection she has pain on the R side which is now resolved.   She still has pain on the L side of her lower back although it is improved.   She no longer has numbness and tingling.   Her pain does not radiate past her thigh anymore.   Reports her back pain is more localized rather than widespread.  Overall her pain is more tolerable.    Rates her pain 4/10.       Past Medical History:   Diagnosis Date    Allergic rhinitis     Hypothyroid     Migraines      Past Surgical History:   Procedure Laterality Date    CERVICAL CERCLAGE      COLD KNIFE CONIZATION OF CERVIX      COLONOSCOPY N/A 2020    Procedure: COLONOSCOPY;  Surgeon: Camille Ridley MD;  Location: [facility] ENDO;  Service: Endoscopy;  Laterality: N/A;    HYSTERECTOMY      OOPHORECTOMY      left ovary    SELECTIVE INJECTION OF ANESTHETIC AGENT AROUND LUMBAR SPINAL NERVE ROOT BY TRANSFORAMINAL APPROACH Bilateral 2020    Procedure: Bilateral L2/3 TF JEANNETTE;  Surgeon: Bassam Hoffman MD;  [facility];  Service: Pain Management;  Laterality: Bilateral;    TUBAL LIGATION      WRIST SURGERY      right, cyst removed.     Family History   Problem Relation Age of Onset    Uterine cancer Mother     Cancer Mother     Ovarian cancer Mother     Cancer Sister     Deep vein thrombosis Neg Hx      labor Neg Hx     Breast cancer Neg Hx      Social History     Socioeconomic History    Marital status:      Spouse name: Not on file    Number of children: Not on file    Years of education: Not on file    Highest education level: Not on file   Occupational History    Not on file   Social Needs    Financial resource strain:  Not hard at all    Food insecurity     Worry: Never true     Inability: Never true    Transportation needs     Medical: No     Non-medical: No   Tobacco Use    Smoking status: Never Smoker    Smokeless tobacco: Never Used   Substance and Sexual Activity    Alcohol use: Yes     Alcohol/week: 0.0 standard drinks     Frequency: 2-4 times a month     Drinks per session: 1 or 2     Binge frequency: Never     Comment: occ    Drug use: No    Sexual activity: Yes     Partners: Male     Birth control/protection: Surgical     Comment: tubal   Lifestyle    Physical activity     Days per week: 1 day     Minutes per session: 40 min    Stress: Not at all   Relationships    Social connections     Talks on phone: More than three times a week     Gets together: More than three times a week     Attends Anabaptism service: Not on file     Active member of club or organization: No     Attends meetings of clubs or organizations: Never     Relationship status:    Other Topics Concern    Not on file   Social History Narrative    Live w/ spouse      Medication List with Changes/Refills   Current Medications    ALBUTEROL (PROAIR HFA) 90 MCG/ACTUATION INHALER    ProAir HFA 90 mcg/actuation aerosol inhaler   Inhale 2 puffs every 4 hours by inhalation route.    FEXOFENADINE HCL (ALLEGRA ORAL)    Take 1 tablet by mouth 2 (two) times a day.     HYDROCHLOROTHIAZIDE (HYDRODIURIL) 25 MG TABLET    hydrochlorothiazide 25 mg tablet   TAKE 1 TABLET BY MOUTH EVERY DAY IN THE MORNING    LEVOTHYROXINE (SYNTHROID) 150 MCG TABLET    levothyroxine 150 mcg tablet   Take 1 tablet every day by oral route in the morning for 30 days.    LIOTHYRONINE (CYTOMEL) 5 MCG TAB    liothyronine 5 mcg tablet   Take 1 tablet twice a day by oral route for 30 days.    MONTELUKAST (SINGULAIR) 10 MG TABLET    Take 10 mg by mouth every evening.    NAPROXEN SODIUM (ALEVE ORAL)    Take 1 tablet by mouth 2 (two) times a day.     Review of patient's allergies  indicates:  No Known Allergies  Review of Systems   Constitution: Negative for chills, decreased appetite, fever, night sweats, weight gain and weight loss.   HENT: Negative for congestion.    Eyes: Negative for blurred vision.   Cardiovascular: Negative for chest pain, claudication, near-syncope and syncope.   Respiratory: Negative for cough, hemoptysis and shortness of breath.    Endocrine: Negative for cold intolerance.   Skin: Negative for color change and rash.   Musculoskeletal: Positive for back pain.   Gastrointestinal: Negative for abdominal pain and bowel incontinence.   Genitourinary: Negative for bladder incontinence.   Neurological: Negative for tremors.   Psychiatric/Behavioral: Negative for altered mental status.   Allergic/Immunologic: Negative for environmental allergies.         Objective:     Body mass index is 31.73 kg/m².  Vitals:    12/08/20 1446   BP: (!) 148/86   Pulse: 106   Resp: 16          Nursing note and vitals reviewed  Gen:Oriented to person, place, and time.             Appears stated age   Skin: no rashes or lesions identified   Head:Normocephalic and atraumatic.  Nose: Nose normal.    Eyes: EOM are normal. Pupils are equal, round, and reactive to light.   Neck: Neck supple. No masses or lesions palpated  Cardiovascular: Intact distal pulses.    Abdominal: Soft.   Neurological: Alert and oriented to person, place, and time.  No cranial nerve deficit.  Coordination normal. Normal muscle tone  Psychiatric: Normal mood and affect. Behavior is normal.      Back:  TTP L side upper lumbar  None Present, left upper lumbar Paraspinal muscle spasms   None present Pain with flexion and extention   WNL limited Range of motion    Neg  Straight leg raise     Motor   Right Right Left Left  Level Group   5  5 4+ L2 Hip flexor (Psoas)   5  5  L3 Leg extension (Quads)   5  5  L4 Dorsiflexion & foot inversion (Tibialis Anterior)   5  5  L5 Great toe extension ( EHL)   5  5  S1 Foot eversion (Gastroc,  PL & PB)     Sensation  NL Decreased (R/L/BL) Level Sensation    X  L2 Anterio-medial thigh   X  L3 Medial thigh around knee   X  L4 Medial foot   X  L5 Dorsum foot   X  S1 Lateral foot     Reflex  2+  Patellar tendon (L4)   2+  Achilles tendon (S1)       Results for orders placed during the hospital encounter of 11/05/20   MRI Lumbar Spine Without Contrast    Narrative EXAMINATION:  MRI LUMBAR SPINE WITHOUT CONTRAST    CLINICAL HISTORY:  Back pain or radiculopathy, > 6 wks; Dorsalgia, unspecified    TECHNIQUE:  Multiplanar, multisequence MR images were acquired from the thoracolumbar junction to the sacrum without the administration of contrast.    COMPARISON:  Radiographs dated 11/03/2020    FINDINGS:  Alignment: Normal.    Vertebrae: Normal marrow signal. No fracture.    Discs: Moderate to severe disc height loss at L2-3.  Mild disc height loss at L1-2, L3-4, and L4-5.    Cord: Normal.  Conus terminates at L1    Degenerative findings:    T12-L1:  No spinal canal or neuroforaminal stenosis.    L1-L2: Mild generalized disc bulge. No spinal canal or neuroforaminal stenosis.    L2-L3: Left paracentral disc protrusion superimposed upon more generalized disc bulge.  Mild bilateral facet arthropathy.  There is effacement of the left lateral recess with abutment of the the descending left L3 nerve root by disc material.  Otherwise no significant spinal canal stenosis.  Mild left neural foraminal narrowing.    L3-L4: Mild generalized disc bulge.  Mild bilateral facet arthropathy. No spinal canal or neuroforaminal stenosis.    L4-L5: Mild generalized disc bulge.  Mild bilateral facet arthropathy.  Mild right neural foraminal narrowing.  No spinal canal stenosis.    L5-S1: Mild generalized disc bulge.  Moderate bilateral facet arthropathy. No spinal canal or neuroforaminal stenosis.    Paraspinal muscles & soft tissues: Unremarkable.      Impression Multilevel degenerative disc disease and facet arthropathy as detailed  above.  Findings are most severe at the level of L2-3.      Electronically signed by: Evaristo Marquez MD  Date:    11/05/2020  Time:    12:52      Results for orders placed during the hospital encounter of 11/03/20   X-Ray Lumbar Complete With Flex And Ext    Narrative EXAMINATION:  XR LUMBAR SPINE 5 VIEW WITH FLEX AND EXT    CLINICAL HISTORY:  Back pain or radiculopathy, > 6 wks;  Dorsalgia, unspecified    TECHNIQUE:  Five views of the lumbar spine plus flexion extension views were performed.    COMPARISON:  None.    FINDINGS:  Mild dextroscoliosis.  Vertebral body heights maintained without spondylolisthesis.  L2-3 disc height loss present with a anterior/posterior osteophyte changes.  Lower lumbar spine facet arthropathy present without pars defect.  Soft tissues unremarkable.    No significant change in alignment on flexion or extension views.      Impression Degenerative findings      Electronically signed by: Jose Flores MD  Date:    11/03/2020  Time:    11:06     Relevant imaging results reviewed and interpreted by me, discussed with the patient and / or family today.  Assessment:     1. Lumbar radiculopathy    2. DDD (degenerative disc disease), lumbar      Plan:     Lumbar radiculopathy  -     Procedure Order to Pain Management; Future; Expected date: 12/08/2020    DDD (degenerative disc disease), lumbar  -     Procedure Order to Pain Management; Future; Expected date: 12/08/2020        Will refer patient back to Dr. Hoffman for Left side TF JEANNETTE at L2/3.  If patient continues to have pain after that injection she is instructed to follow-up with [facility] clinic.    Jayson Thornton PA-C  [facility]

## 2020-12-09 ENCOUNTER — PATIENT MESSAGE (OUTPATIENT)
Dept: NEUROSURGERY | Facility: CLINIC | Age: 50
End: 2020-12-09

## 2020-12-09 DIAGNOSIS — M54.16 BILATERAL LUMBAR RADICULOPATHY: Primary | ICD-10-CM

## 2020-12-11 NOTE — PRE-PROCEDURE INSTRUCTIONS
Spoke with patient regarding procedure scheduled on 12/16    Arrival time 0630    Has patient been sick with fever or on antibiotics within the last 7 days? No    Does the patient have any open wounds, sores or rashes? No    Has patient received a vaccination within the last 7 days? No    Has the patient stopped all medications as directed? Naproxen on 12.8. Hold any other vitamins, supplements and other NSAIDS. Denies blood thinners    Does patient have a pacemaker and or defibrillator? no    Does the patient have a ride to and from procedure and someone reliable to remain with patient?  [place]    Is the patient diabetic? no    Does the patient have sleep apnea? Or use O2 at home? No and no     Is the patient receiving sedation? yes    Is the patient instructed to remain NPO beginning at midnight the night before their procedure? yes    Procedure location confirmed with patient? Yes    Covid- Denies signs/symptoms. Instructed to notify PAT/MD if any changes.

## 2020-12-16 ENCOUNTER — HOSPITAL ENCOUNTER (OUTPATIENT)
Facility: HOSPITAL | Age: 50
Discharge: HOME | End: 2020-12-16
Attending: PAIN MEDICINE | Admitting: PAIN MEDICINE
Payer: COMMERCIAL

## 2020-12-16 VITALS
BODY MASS INDEX: 29.94 KG/M2 | HEIGHT: 66 IN | RESPIRATION RATE: 15 BRPM | DIASTOLIC BLOOD PRESSURE: 77 MMHG | WEIGHT: 186.31 LBS | OXYGEN SATURATION: 100 % | SYSTOLIC BLOOD PRESSURE: 126 MMHG | TEMPERATURE: 98 F | HEART RATE: 74 BPM

## 2020-12-16 DIAGNOSIS — M54.16 LUMBAR RADICULOPATHY: Primary | ICD-10-CM

## 2020-12-16 PROCEDURE — 64483 PR EPIDURAL INJ, ANES/STEROID, TRANSFORAMINAL, LUMB/SACR, SNGL LEVL: ICD-10-PCS | Mod: LT,,, | Performed by: PAIN MEDICINE

## 2020-12-16 PROCEDURE — 64483 NJX AA&/STRD TFRM EPI L/S 1: CPT | Performed by: PAIN MEDICINE

## 2020-12-16 PROCEDURE — 64483 NJX AA&/STRD TFRM EPI L/S 1: CPT | Mod: LT,,, | Performed by: PAIN MEDICINE

## 2020-12-16 PROCEDURE — 25500020 PHARM REV CODE 255: Performed by: PAIN MEDICINE

## 2020-12-16 PROCEDURE — 63600175 PHARM REV CODE 636 W HCPCS: Performed by: PAIN MEDICINE

## 2020-12-16 PROCEDURE — 25000003 PHARM REV CODE 250: Performed by: PAIN MEDICINE

## 2020-12-16 RX ORDER — FENTANYL CITRATE 50 UG/ML
INJECTION, SOLUTION INTRAMUSCULAR; INTRAVENOUS
Status: DISCONTINUED | OUTPATIENT
Start: 2020-12-16 | End: 2020-12-16 | Stop reason: HOSPADM

## 2020-12-16 RX ORDER — MIDAZOLAM HYDROCHLORIDE 1 MG/ML
INJECTION, SOLUTION INTRAMUSCULAR; INTRAVENOUS
Status: DISCONTINUED | OUTPATIENT
Start: 2020-12-16 | End: 2020-12-16 | Stop reason: HOSPADM

## 2020-12-16 RX ORDER — LIDOCAINE HYDROCHLORIDE 10 MG/ML
INJECTION, SOLUTION EPIDURAL; INFILTRATION; INTRACAUDAL; PERINEURAL
Status: DISCONTINUED | OUTPATIENT
Start: 2020-12-16 | End: 2020-12-16 | Stop reason: HOSPADM

## 2020-12-16 RX ORDER — DEXAMETHASONE SODIUM PHOSPHATE 10 MG/ML
INJECTION INTRAMUSCULAR; INTRAVENOUS
Status: DISCONTINUED | OUTPATIENT
Start: 2020-12-16 | End: 2020-12-16 | Stop reason: HOSPADM

## 2020-12-16 NOTE — OP NOTE
"Procedure: Lumbar Transforaminal Epidural Steroid Injection under Fluoroscopic Guidance (supraneural approach)    Level: L2/3     Side: Left    PROCEDURE DATE: 12/16/2020    Pre-operative Diagnosis: Lumbar Radiculopathy  Post-operative Diagnosis: Lumbar Radiculopathy    Provider: TIFFANIE Hoffman MD  Assistant(s): None    Anesthesia: Local, IV Sedation    >> 1 mg of VERSED    >> 50 mcg of FENTANYL     Indication: Low back pain with radiculopathy consistent with distribution of targeted nerve. Symptoms unresponsive to conservative treatments. Fluoroscopy was used to optimize visualization of needle placement and to maximize safety.     Procedure Description / Technique:  The patient was seen and identified in the preoperative area. Risks, benefits, complications, and alternatives were discussed with the patient. The patient agreed to proceed with the procedure and signed the consent. The site and side of the procedure was identified and marked. An IV was started. The patient was taken to the procedural suite.    The patient was positioned in prone orientation on procedure table and a pillow was placed under the abdomen to reduce lumbar lordosis. A time out was performed prior to any intervention. The procedure, site, side, and allergies were stated and agreed to by all present. The lumbosacral area was widely prepped with ChloraPrep. The procedural site was draped in usual sterile fashion. Vital signs were closely monitored throughout this procedure. Conscious sedation was used for this procedure to decrease patient anxiety.    The target area was visualized under fluoroscopy. The cephalocaudal angle of the fluoroscope was adjusted as to align the vertebral end plates. The fluoroscopic arm was rotated ipsilaterally to an angle of approximately 30 degrees until the "gorge dog" outline came into view and the tip of the inferior superior articular process pointed towards the midline, 6:00 position of the above pedicle. " "A 25 gauge 3.5 inch spinal needle was directed towards the "chin" of the "gorge dog" (adjacent to the pars interarticularis and inferior to the pedicle). The needle was advanced until OS was met at the inferior border of the pedicle / pars interface. The needle was adjusted so that it would pass inferior to the osseous border. The fluoroscope was then placed in the lateral position and the needle was slowly advanced until it rested in the posterior 1/3rd of the vertebral foramen. AP fluoroscopy was checked and the needle tip rested at the 6:00 position under the pedicle. No paresthesia was elicited during needle placement. With the needle tip in its final position, gentle aspiration was negative for blood and CSF. Omnipaque 240 (1 to 2 mL) was injected under live fluoroscopy. Microbore tubing was used for injection. There was no pain or paresthesia on injection. The contrast clearly delineated the targeted nerve root on AP fluoroscopy. No vascular uptake was seen. A solution containing 1 mL of 1% PF Lidocaine and 1 mL of Dexamethasone (10 mg/mL) was mixed and 2 mL was injected slowly at each level targeted. There was minimal resistance on injection. No pain or paresthesia was elicited on injection. The stylet was replaced and the needle was withdrawn intact. This procedure was performed for each of the above indicated levels.     Description of Findings: Not applicable    Prosthetic devices, grafts, tissues, or devices implanted: None    Specimen Removed: No    Estimated Blood Loss: minimal    COMPLICATIONS: None    DISPOSITION / PLANS: The patient was transferred to the recovery area in a stable condition for observation. The patient was reexamined prior to discharge. There was no evidence of acute neurologic injury following the procedure.  Patient was discharged from the recovery room after meeting discharge criteria. Home discharge instructions were given to the patient by the staff.    "

## 2020-12-16 NOTE — DISCHARGE SUMMARY
[facility]  Short Stay  Discharge Summary    Admit Date: 12/16/2020    Discharge Date and Time: 12/16/2020  8:15 AM      Discharge Attending Physician: TIFFANIE Hoffman MD     Hospital Course (synopsis of major diagnoses, care, treatment, and services provided during the course of the hospital stay): Patient was admitted to Pre-op where informed consent was signed.  The patient was then taken to the procedure suite where the procedure was performed.  The patient was then return to the Pre-Op area and discharge was performed.     Final Diagnoses:    Principal Problem: <principal problem not specified>   Secondary Diagnoses:   Active Hospital Problems    Diagnosis  POA    Lumbar radiculopathy [M54.16]  Yes      Resolved Hospital Problems   No resolved problems to display.       Discharged Condition: good    Disposition: Home or Self Care    Follow up/Patient Instructions:    Medications:  Reconciled Home Medications:      Medication List      CONTINUE taking these medications    ALEVE ORAL  Take 1 tablet by mouth 2 (two) times a day.     ALLEGRA ORAL  Take 1 tablet by mouth 2 (two) times a day.     hydroCHLOROthiazide 25 MG tablet  Commonly known as: HYDRODIURIL  hydrochlorothiazide 25 mg tablet   TAKE 1 TABLET BY MOUTH EVERY DAY IN THE MORNING     levothyroxine 150 MCG tablet  Commonly known as: SYNTHROID  levothyroxine 150 mcg tablet   Take 1 tablet every day by oral route in the morning for 30 days.     liothyronine 5 MCG Tab  Commonly known as: CYTOMEL  liothyronine 5 mcg tablet   Take 1 tablet twice a day by oral route for 30 days.     montelukast 10 mg tablet  Commonly known as: SINGULAIR  Take 10 mg by mouth every evening.     PROAIR HFA 90 mcg/actuation inhaler  Generic drug: albuterol  ProAir HFA 90 mcg/actuation aerosol inhaler   Inhale 2 puffs every 4 hours by inhalation route.          Discharge Procedure Orders   Diet general     Call MD for:  severe uncontrolled pain     Call MD for:   difficulty breathing, headache or visual disturbances     Call MD for:  redness, tenderness, or signs of infection (pain, swelling, redness, odor or green/yellow discharge around incision site)     Activity as tolerated

## 2020-12-16 NOTE — DISCHARGE INSTRUCTIONS

## 2020-12-16 NOTE — INTERVAL H&P NOTE
The patient has been examined and the H&P has been reviewed:    I concur with the findings and changes have been noted since the H&P was written: will proceed with left L2/3 TFESI    Anesthesia/Surgery risks, benefits and alternative options discussed and understood by patient/family.          There are no hospital problems to display for this patient.

## 2020-12-31 ENCOUNTER — OFFICE VISIT (OUTPATIENT)
Dept: URGENT CARE | Facility: CLINIC | Age: 50
End: 2020-12-31
Payer: COMMERCIAL

## 2020-12-31 VITALS
SYSTOLIC BLOOD PRESSURE: 121 MMHG | TEMPERATURE: 98 F | OXYGEN SATURATION: 96 % | RESPIRATION RATE: 20 BRPM | BODY MASS INDEX: 29.73 KG/M2 | HEART RATE: 89 BPM | HEIGHT: 66 IN | WEIGHT: 185 LBS | DIASTOLIC BLOOD PRESSURE: 73 MMHG

## 2020-12-31 DIAGNOSIS — J02.9 SORE THROAT: ICD-10-CM

## 2020-12-31 DIAGNOSIS — R05.9 COUGH: ICD-10-CM

## 2020-12-31 DIAGNOSIS — U07.1 COVID-19 VIRUS INFECTION: ICD-10-CM

## 2020-12-31 DIAGNOSIS — R43.2 LOSS OF TASTE: Primary | ICD-10-CM

## 2020-12-31 LAB
CTP QC/QA: YES
SARS-COV-2 RDRP RESP QL NAA+PROBE: POSITIVE

## 2020-12-31 PROCEDURE — 99212 PR OFFICE/OUTPT VISIT, EST, LEVL II, 10-19 MIN: ICD-10-PCS | Mod: S$GLB,,, | Performed by: NURSE PRACTITIONER

## 2020-12-31 PROCEDURE — 99212 OFFICE O/P EST SF 10 MIN: CPT | Mod: S$GLB,,, | Performed by: NURSE PRACTITIONER

## 2020-12-31 PROCEDURE — U0002 COVID-19 LAB TEST NON-CDC: HCPCS | Mod: QW,S$GLB,, | Performed by: NURSE PRACTITIONER

## 2020-12-31 PROCEDURE — U0002: ICD-10-PCS | Mod: QW,S$GLB,, | Performed by: NURSE PRACTITIONER

## 2020-12-31 RX ORDER — AMOXICILLIN AND CLAVULANATE POTASSIUM 875; 125 MG/1; MG/1
TABLET, FILM COATED ORAL
COMMUNITY
Start: 2020-12-30 | End: 2021-04-26

## 2020-12-31 RX ORDER — BENZONATATE 100 MG/1
100 CAPSULE ORAL 3 TIMES DAILY PRN
Qty: 30 CAPSULE | Refills: 1 | Status: SHIPPED | OUTPATIENT
Start: 2020-12-31 | End: 2021-04-26

## 2020-12-31 RX ORDER — HYDROCODONE POLISTIREX AND CHLORPHENIRAMINE POLISTIREX 10; 8 MG/5ML; MG/5ML
5 SUSPENSION, EXTENDED RELEASE ORAL
COMMUNITY
End: 2021-04-26

## 2020-12-31 NOTE — PATIENT INSTRUCTIONS
You have tested positive for COVID-19 today. Per the CDC, you are now in a 10 day isolation.  This isolation starts from the day you first developed symptoms, not the day of your positive test. For example, if your symptoms began on a Monday, and you waited until Friday of the same week to get tested, and it was positive, your 10 day isolation begins from that Monday, not the Friday you tested positive.  However, if you are asymptomatic (a person who does not have any symptoms), and received a COVID-19 test that was positive, your 10 day isolation begins on the day you tested positive. This is regardless if you were exposed to a known positive days earlier. So for example, if you test positive as an asymptomatic on day 7 from exposure, you have now extended your 14 day quarantine to a 17 day quarantine.  Also, per the CDC guidelines, once your 10 days have passed, and you have not had fever greater than 100.4F in the last 24 hours without taking any fever reducers such as Tylenol (Acetaminophen) or Motrin (Ibuprofen), you may return to your normal activities including social distancing, wearing masks, and frequent handwashing - YOU DO NOT NEED ANOTHER TEST, OR TO TEST NEGATIVE, IN ORDER TO END YOUR QUARANTINE!    Your test was POSITIVE for COVID-19 (coronavirus).       Please isolate yourself at home.  You may leave home and/or return to work once the following conditions are met:    If you were not hospitalized and are not severely immunocompromised*:   More than 10 days since symptoms first appeared AND   More than 24 hours fever free without medications AND   Symptoms have improved     Additional instructions:   Separate yourself from other people and animals in your home.   Call ahead before visiting your doctor.   Wear a facemask when around others.   Cover your coughs and sneezes.   Wash your hands often with soap and water; hand  can be used, too.   Avoid sharing personal household  items.   Wipe down surfaces used daily.   Monitor your symptoms. Seek prompt medical attention if your illness is worsening (e.g., difficulty breathing).    Before seeking care, call your healthcare provider.   If you have a medical emergency and need to call 911, notify the dispatch personnel that you have, or are being evaluated for COVID-19. If possible, put on a facemask before emergency medical services arrive.      Contact Tracing    As one of the next steps, you will receive a call or text from the [place] Department of [facility] ([facility]) COVID-19 Tracing Team. See the contact information below so you know not to ignore the health departments call. It is important that you contact them back immediately so they can help.      Contact Tracer Number:  [phone]  Caller ID for most carriers: [place] Dept [facility]     What is contact tracing?  · Contact tracing is a process that helps identify everyone who has been in close contact with an infected person. Contact tracers let those people know they may have been exposed and guide them on next steps. Confidentiality is important for everyone; no one will be told who may have exposed them to the virus.  · Your involvement is important. The more we know about where and how this virus is spreading, the better chance we have at stopping it from spreading further.  What does exposure mean?  · Exposure means you have been within 6 feet for more than 15 minutes with a person who has or had COVID-19.  What kind of questions do the contact tracers ask?  · A contact tracer will confirm your basic contact information including name, address, phone number, and next of kin, as well as asking about any symptoms you may have had. Theyll also ask you how you think you may have gotten sick, such as places where you may have been exposed to the virus, and people you were with. Those names will never be shared with anyone outside of that call, and will only be used to help trace and  stop the spread of the virus.   I have privacy concerns. How will the state use my information?  · Your privacy about your health is important. All calls are completed using call centers that use the appropriate health privacy protection measures (HIPAA compliance), meaning that your patient information is safe. No one will ever ask you any questions related to immigration status. Your health comes first.   Do I have to participate?  · You do not have to participate, but we strongly encourage you to. Contact tracing can help us catch and control new outbreaks as theyre developing to keep your friends and family safe.   What if I dont hear from anyone?  · If you dont receive a call within 24 hours, you can call the number above right away to inquire about your status. That line is open from 8:00 am - 8:00 p.m., 7 days a week.  Contact tracing saves lives! Together, we have the power to beat this virus and keep our loved ones and neighbors safe.    For more information see CDC link below.      https://www.cdc.gov/coronavirus/2019-ncov/hcp/guidance-prevent-spread.html#precautions        Sources:  [facility], [place] [facility]    Sincerely,     Monae Ortiz NP                                              Viral Illness  If your condition worsens or fails to improve we recommend that you receive another evaluation at the ER immediately or contact your PCP to discuss your concerns or return here. You must understand that you've received an urgent care treatment only and that you may be released before all your medical problems are known or treated. You the patient will arrange for follouwp care as instructed.   We discussed that your illness is viral in nature so you will treat this symptomatically.    Claritin or Zyrtec for allergy symptoms  Tylenol or Motrin for fever, pain or sore throat  Rest and fluids are important  Cough, Chronic, Uncertain Cause (Adult)    Everyone has had a cough as  part of the common cold, flu, or bronchitis. This kind of cough occurs along with an achy feeling, low-grade fever, nasal and sinus congestion, and a scratchy or sore throat. This usually gets better in 2 to 3 weeks. A cough that lasts longer than 3 weeks may be due to other causes.  If your cough does not improve over the next 2 weeks, further testing may be needed. Follow up with your healthcare provider as advised. Cough suppressants may be recommended. Based on your exam today, the exact cause of your cough is not certain. Below are some common causes for persistent cough.  Smokers cough  Smokers cough doesnt go away. If you continue to smoke, it only gets worse. The cough is from irritation in the air passages. Talk to your healthcare provider about quitting. Medicines or nicotine-replacement products, like gum or the patch, may make quitting easier.  Postnasal drip  A cough that is worse at night may be due to postnasal drip. Excess mucus in the nose drains from the back of your nose to your throat. This triggers the cough reflex. Postnasal drip may be due to a sinus infection or allergy. Common allergens include dust, tobacco smoke (both inhaled and secondhand smoke), environmental pollutants, pollen, mold, pets, cleaning agents, room deodorizers, and chemical fumes. Over-the-counter antihistamines or decongestants may be helpful for allergies. A sinus infection may requires antibiotic treatment. See your healthcare provider if symptoms continue.  Medicines  Certain prescribed medicines can cause a chronic cough in some people:  · ACE inhibitors for high blood pressure. These include benazepril, captopril, enalapril, fosinopril, lisinopril, quinapril, ramipril, and others.  · Beta-blockers for high blood pressure and other conditions. These include propranolol, atenolol, metoprolol, nadolol, and others.  Let your healthcare provider know if you are taking any of these.  Asthma  Cough may be the only sign of  mild asthma. You may have tests to find out if asthma is causing your cough. You may also take asthma medicine on a trial basis.  Acid reflux (heartburn, GERD)  The esophagus is the tube that carries food from the mouth to the stomach. A valve at its lower end prevents stomach acids from flowing upward. If this valve does not work properly, acid from the stomach enters the esophagus. This may cause a burning pain in the upper abdomen or lower chest, belching, or cough. Symptoms are often worse when lying flat. Avoid eating or drinking before bedtime. Try using extra pillows to raise your upper body, or place 4-inch blocks under the head of your bed. You may try an over-the-counter antacid or an acid-blocking medicine such as famotidine, cimetidine, ranitidine, esomeprazole, lansoprazole, or omeprazole. Stronger medicines for this condition can be prescribed by your healthcare provider.  Follow-up care  Follow up with your healthcare provider, or as advised, if your cough does not improve. Further testing may be needed.  Note: If an X-ray was taken, a specialist will review it. You will be notified of any new findings that may affect your care.  When to seek medical advice  Call your healthcare provider right away if any of these occur:  · Mild wheezing or difficulty breathing  · Fever of 100.4ºF (38ºC) or higher, or as directed by your healthcare provider  · Unexpected weight loss  · Coughing up large amounts of colored sputum  · Night sweats (sheets and pajamas get soaking wet)  Call 911, or get immediate medical care  Contact emergency services right away if any of these occur:  · Coughing up blood  · Moderate to severe trouble breathing or wheezing  Date Last Reviewed: 9/13/2015  © 7148-1873 [employer]. [address] [place] [address], [place] [address]. All rights reserved. This information is not intended as a substitute for professional medical care. Always follow your healthcare professional's  instructions.        Viral Upper Respiratory Illness (Adult)  You have a viral upper respiratory illness (URI), which is another term for the common cold. This illness is contagious during the first few days. It is spread through the air by coughing and sneezing. It may also be spread by direct contact (touching the sick person and then touching your own eyes, nose, or mouth). Frequent handwashing will decrease risk of spread. Most viral illnesses go away within 7 to 10 days with rest and simple home remedies. Sometimes the illness may last for several weeks. Antibiotics will not kill a virus, and they are generally not prescribed for this condition.    Home care  · If symptoms are severe, rest at home for the first 2 to 3 days. When you resume activity, don't let yourself get too tired.  · Avoid being exposed to cigarette smoke (yours or others).  · You may use acetaminophen or ibuprofen to control pain and fever, unless another medicine was prescribed. (Note: If you have chronic liver or kidney disease, have ever had a stomach ulcer or gastrointestinal bleeding, or are taking blood-thinning medicines, talk with your healthcare provider before using these medicines.) Aspirin should never be given to anyone under 18 years of age who is ill with a viral infection or fever. It may cause severe liver or brain damage.  · Your appetite may be poor, so a light diet is fine. Avoid dehydration by drinking 6 to 8 glasses of fluids per day (water, soft drinks, juices, tea, or soup). Extra fluids will help loosen secretions in the nose and lungs.  · Over-the-counter cold medicines will not shorten the length of time youre sick, but they may be helpful for the following symptoms: cough, sore throat, and nasal and sinus congestion. (Note: Do not use decongestants if you have high blood pressure.)  Follow-up care  Follow up with your healthcare provider, or as advised.  When to seek medical advice  Call your healthcare provider  right away if any of these occur:  · Cough with lots of colored sputum (mucus)  · Severe headache; face, neck, or ear pain  · Difficulty swallowing due to throat pain  · Fever of 100.4°F (38°C)  Call 911, or get immediate medical care  Call emergency services right away if any of these occur:  · Chest pain, shortness of breath, wheezing, or difficulty breathing  · Coughing up blood  · Inability to swallow due to throat pain  Date Last Reviewed: 9/13/2015 © 2000-2017 [employer] [address] [place] [address], [place] [address]. All rights reserved. This information is not intended as a substitute for professional medical care. Always follow your healthcare professional's instructions.        Self-Care for Sore Throats    Sore throats happen for many reasons, such as colds, allergies, and infections caused by viruses or bacteria. In any case, your throat becomes red and sore. Your goal for self-care is to reduce your discomfort while giving your throat a chance to heal.  Moisten and soothe your throat  Tips include the following:  · Try a sip of water first thing after waking up.  · Keep your throat moist by drinking 6 or more glasses of clear liquids every day.  · Run a cool-air humidifier in your room overnight.  · Avoid cigarette smoke.   · Suck on throat lozenges, cough drops, hard candy, ice chips, or frozen fruit-juice bars. Use the sugar-free versions if your diet or medical condition requires them.  Gargle to ease irritation  Gargling every hour or 2 can ease irritation. Try gargling with 1 of these solutions:  · 1/4 teaspoon of salt in 1/2 cup of warm water  · An over-the-counter anesthetic gargle  Use medicine for more relief  Over-the-counter medicine can reduce sore throat symptoms. Ask your pharmacist if you have questions about which medicine to use:  · Ease pain with anesthetic sprays. Aspirin or an aspirin substitute also helps. Remember, never give aspirin to anyone 18 or younger, or if you  are already taking blood thinners.   · For sore throats caused by allergies, try antihistamines to block the allergic reaction.  · Remember: unless a sore throat is caused by a bacterial infection, antibiotics wont help you.  Prevent future sore throats  Prevention tips include the following:  · Stop smoking or reduce contact with secondhand smoke. Smoke irritates the tender throat lining.  · Limit contact with pets and with allergy-causing substances, such as pollen and mold.  · When youre around someone with a sore throat or cold, wash your hands often to keep viruses or bacteria from spreading.  · Dont strain your vocal cords.  Call your healthcare provider  Contact your healthcare provider if you have:  · A temperature over 101°F (38.3°C)  · White spots on the throat  · Great difficulty swallowing  · Trouble breathing  · A skin rash  · Recent exposure to someone else with strep bacteria  · Severe hoarseness and swollen glands in the neck or jaw   Date Last Reviewed: 8/1/2016  © 0641-7853 [employer]. [address] [place] [address], [place] [address]. All rights reserved. This information is not intended as a substitute for professional medical care. Always follow your healthcare professional's instructions.

## 2020-12-31 NOTE — LETTER
[address], [address] ? [place], [id number] ? Phone [phone] ? Fax             Return to Work/School    Patient: Bing Mclaughlin  YOB: 1970   Date: 12/31/2020      To Whom It May Concern:     Bing Mclaughlin was in contact with/seen in my office on 12/31/2020. COVID-19 is present in our communities across the state. Not all patients are eligible or appropriate to be tested. In this situation, your employee meets the following criteria:     Bing Mclaughlin has met the criteria for COVID-19 testing and has a POSITIVE result. She can return to work once they are asymptomatic for 24 hours without the use of fever reducing medications AND at least ten days from the start of symptoms (or from the first positive result if they have no symptoms).      If you have any questions or concerns, or if I can be of further assistance, please do not hesitate to contact me.     Sincerely,    Monae Ortiz NP

## 2020-12-31 NOTE — PROGRESS NOTES
"Subjective:       Patient ID: Bing Mclaughlin is a 50 y.o. female.    Vitals:  height is 5' 5.5" (1.664 m) and weight is 83.9 kg (185 lb). Her temperature is 97.6 °F (36.4 °C). Her blood pressure is 121/73 and her pulse is 89. Her respiration is 20 and oxygen saturation is 96%.     Chief Complaint: Sinus Problem    Patient states symptoms started with diarrhea.     Sinus Problem  This is a new problem. The current episode started 1 to 4 weeks ago. The problem has been gradually worsening since onset. The maximum temperature recorded prior to her arrival was 100.4 - 100.9 F. Her pain is at a severity of 0/10. The pain is mild. Associated symptoms include coughing, headaches, sinus pressure and a sore throat. Pertinent negatives include no chills, congestion, diaphoresis, ear pain or shortness of breath. Past treatments include acetaminophen. The treatment provided mild relief.       Constitution: Positive for fatigue and fever. Negative for chills and sweating.        No taste    HENT: Positive for sinus pain, sinus pressure and sore throat. Negative for ear pain, congestion and voice change.         Hurt when to swallow   Neck: Negative for painful lymph nodes.   Eyes: Negative for eye redness.   Respiratory: Positive for cough and sputum production. Negative for chest tightness, bloody sputum, COPD, shortness of breath, stridor, wheezing and asthma.    Gastrointestinal: Positive for diarrhea. Negative for nausea and vomiting.   Musculoskeletal: Negative for muscle ache.   Skin: Negative for rash.   Allergic/Immunologic: Negative for seasonal allergies and asthma.   Neurological: Positive for headaches.   Hematologic/Lymphatic: Negative for swollen lymph nodes.       Objective:      Physical Exam        Audio Only Telehealth Visit     The patient location is: [facility]   The chief complaint leading to consultation is: Sore throat and Cough  Visit type: Virtual visit with audio only " (telephone)  Total time spent with patient: 15 mins     The reason for the audio only service rather than synchronous audio and video virtual visit was related to technical difficulties or patient preference/necessity.     Each patient to whom I provide medical services by telemedicine is:  (1) informed of the relationship between the physician and patient and the respective role of any other health care provider with respect to management of the patient; and (2) notified that they may decline to receive medical services by telemedicine and may withdraw from such care at any time. Patient verbally consented to receive this service via voice-only telephone call.       HPI:  50-year-old female presents to the clinic with concerns for COVID-19 virus.  Denies any known exposure to anyone with similar symptoms are positive for COVID -19.  Patient reports symptoms of headache, diarrhea, cough, sputum production, sinus pain, sinus pressure, sore throat, fever, fatigue and loss of taste.  Denies loss of smell at this time.  Symptoms started yesterday while lost taste this morning.    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      Assessment and plan:  See Below      This service was not originating from a related E/M service provided within the previous 7 days nor will  to an E/M service or procedure within the next 24 hours or my soonest available appointment.  Prevailing standard of care was able to be met in this audio-only visit.       Results for orders placed or performed in visit on 12/31/20   POCT COVID-19 Rapid Screening   Result Value Ref Range    POC Rapid COVID Positive (A) Negative     Acceptable Yes       Assessment:       1. Loss of taste    2. Cough    3. Sore throat    4. COVID-19 virus infection        Plan:         Loss of taste  -     POCT COVID-19 Rapid Screening    Cough  -     POCT COVID-19 Rapid Screening  -     benzonatate (TESSALON  PERLES) 100 MG capsule; Take 1 capsule (100 mg total) by mouth 3 (three) times daily as needed for Cough.  Dispense: 30 capsule; Refill: 1    Sore throat  -     POCT COVID-19 Rapid Screening    COVID-19 virus infection  -     COVID-19 Home Symptom Monitoring  - Duration (days): 14      Patient Instructions   You have tested positive for COVID-19 today. Per the CDC, you are now in a 10 day isolation.  This isolation starts from the day you first developed symptoms, not the day of your positive test. For example, if your symptoms began on a Monday, and you waited until Friday of the same week to get tested, and it was positive, your 10 day isolation begins from that Monday, not the Friday you tested positive.  However, if you are asymptomatic (a person who does not have any symptoms), and received a COVID-19 test that was positive, your 10 day isolation begins on the day you tested positive. This is regardless if you were exposed to a known positive days earlier. So for example, if you test positive as an asymptomatic on day 7 from exposure, you have now extended your 14 day quarantine to a 17 day quarantine.  Also, per the CDC guidelines, once your 10 days have passed, and you have not had fever greater than 100.4F in the last 24 hours without taking any fever reducers such as Tylenol (Acetaminophen) or Motrin (Ibuprofen), you may return to your normal activities including social distancing, wearing masks, and frequent handwashing - YOU DO NOT NEED ANOTHER TEST, OR TO TEST NEGATIVE, IN ORDER TO END YOUR QUARANTINE!    Your test was POSITIVE for COVID-19 (coronavirus).       Please isolate yourself at home.  You may leave home and/or return to work once the following conditions are met:    If you were not hospitalized and are not severely immunocompromised*:   More than 10 days since symptoms first appeared AND   More than 24 hours fever free without medications AND   Symptoms have improved     Additional  instructions:   Separate yourself from other people and animals in your home.   Call ahead before visiting your doctor.   Wear a facemask when around others.   Cover your coughs and sneezes.   Wash your hands often with soap and water; hand  can be used, too.   Avoid sharing personal household items.   Wipe down surfaces used daily.   Monitor your symptoms. Seek prompt medical attention if your illness is worsening (e.g., difficulty breathing).    Before seeking care, call your healthcare provider.   If you have a medical emergency and need to call 911, notify the dispatch personnel that you have, or are being evaluated for COVID-19. If possible, put on a facemask before emergency medical services arrive.      Contact Tracing    As one of the next steps, you will receive a call or text from the [place] Department of Health ([facility]) COVID-19 Tracing Team. See the contact information below so you know not to ignore the health departments call. It is important that you contact them back immediately so they can help.      Contact Tracer Number:  [phone]  Caller ID for most carriers: [facility]     What is contact tracing?  · Contact tracing is a process that helps identify everyone who has been in close contact with an infected person. Contact tracers let those people know they may have been exposed and guide them on next steps. Confidentiality is important for everyone; no one will be told who may have exposed them to the virus.  · Your involvement is important. The more we know about where and how this virus is spreading, the better chance we have at stopping it from spreading further.  What does exposure mean?  · Exposure means you have been within 6 feet for more than 15 minutes with a person who has or had COVID-19.  What kind of questions do the contact tracers ask?  · A contact tracer will confirm your basic contact information including name, address, phone number, and next of kin,  as well as asking about any symptoms you may have had. Theyll also ask you how you think you may have gotten sick, such as places where you may have been exposed to the virus, and people you were with. Those names will never be shared with anyone outside of that call, and will only be used to help trace and stop the spread of the virus.   I have privacy concerns. How will the state use my information?  · Your privacy about your health is important. All calls are completed using call centers that use the appropriate health privacy protection measures (HIPAA compliance), meaning that your patient information is safe. No one will ever ask you any questions related to immigration status. Your health comes first.   Do I have to participate?  · You do not have to participate, but we strongly encourage you to. Contact tracing can help us catch and control new outbreaks as theyre developing to keep your friends and family safe.   What if I dont hear from anyone?  · If you dont receive a call within 24 hours, you can call the number above right away to inquire about your status. That line is open from 8:00 am - 8:00 p.m., 7 days a week.  Contact tracing saves lives! Together, we have the power to beat this virus and keep our loved ones and neighbors safe.    For more information see CDC link below.      https://www.cdc.gov/coronavirus/2019-ncov/hcp/guidance-prevent-spread.html#precautions        Sources:  [facility], [place] [facility] and [facility]    Sincerely,     Monae Ortiz NP                                              Viral Illness  If your condition worsens or fails to improve we recommend that you receive another evaluation at the ER immediately or contact your PCP to discuss your concerns or return here. You must understand that you've received an urgent care treatment only and that you may be released before all your medical problems are known or treated. You the patient will arrange for mini  care as instructed.   We discussed that your illness is viral in nature so you will treat this symptomatically.    Claritin or Zyrtec for allergy symptoms  Tylenol or Motrin for fever, pain or sore throat  Rest and fluids are important

## 2021-01-27 ENCOUNTER — TELEPHONE (OUTPATIENT)
Dept: NEUROSURGERY | Facility: CLINIC | Age: 51
End: 2021-01-27

## 2021-02-02 ENCOUNTER — OFFICE VISIT (OUTPATIENT)
Dept: NEUROSURGERY | Facility: CLINIC | Age: 51
End: 2021-02-02
Payer: COMMERCIAL

## 2021-02-02 ENCOUNTER — PATIENT MESSAGE (OUTPATIENT)
Dept: NEUROSURGERY | Facility: CLINIC | Age: 51
End: 2021-02-02

## 2021-02-02 DIAGNOSIS — M51.36 DDD (DEGENERATIVE DISC DISEASE), LUMBAR: ICD-10-CM

## 2021-02-02 DIAGNOSIS — M54.16 LUMBAR RADICULOPATHY: Primary | ICD-10-CM

## 2021-02-02 DIAGNOSIS — M51.36 DDD (DEGENERATIVE DISC DISEASE), LUMBAR: Primary | ICD-10-CM

## 2021-02-02 PROCEDURE — 99441 PR PHYSICIAN TELEPHONE EVALUATION 5-10 MIN: CPT | Mod: 95,,, | Performed by: PHYSICIAN ASSISTANT

## 2021-02-02 PROCEDURE — 99441 PR PHYSICIAN TELEPHONE EVALUATION 5-10 MIN: ICD-10-PCS | Mod: 95,,, | Performed by: PHYSICIAN ASSISTANT

## 2021-04-10 NOTE — DISCHARGE INSTRUCTIONS
To confirm, Your doctor has instructed you that surgery is scheduled for 3/29/17 at  1:00pm.       Please report to [facility], [address], 1st floor, main lobby by 11:30am Pre admit nurse will call day prior to verify final arrival time.      INSTRUCTIONS IMPORTANT!!!   Do not eat, drink, or smoke after 12 midnight. May have water or clear liquid juice until 3 hours prior to surgery. OK to brush teeth, no gum, candy or mints!    ¨ Take only these medicines with a small swallow of water-morning of surgery.  Levothyroxine    Pre operative instructions:  Please review the Pre-Operative Instruction booklet that you were given.        Bathing Instructions--See page 6 in the Pre-operative booklet.      Prevention of surgical site infections:     -Keep incisions clean and dry.   -Do not soak/submerge incisions in water until completely healed.   -Do not apply lotions, powders, creams, or deodorants to site.   -Always make sure hands are cleaned with antibacterial soap/ alcohol-based                 prior to touching the surgical site.  (This includes doctors,                 nurses, staff, and yourself.)    Signs and symptoms:   -Redness and pain around the area where you had surgery   -Drainage of cloudy fluid from your surgical wound   -Fever over 100.4       I have read or had read and explained to me, and understand the above information.  Additional comments or instructions:  Received a copy of Pre-operative instructions booklet, FAQ surgical site infection sheet, and packets of hibiclens (if indicated).      
Purple

## 2021-04-26 ENCOUNTER — OFFICE VISIT (OUTPATIENT)
Dept: FAMILY MEDICINE | Facility: CLINIC | Age: 51
End: 2021-04-26
Payer: COMMERCIAL

## 2021-04-26 ENCOUNTER — HOSPITAL ENCOUNTER (OUTPATIENT)
Dept: RADIOLOGY | Facility: HOSPITAL | Age: 51
Discharge: HOME | End: 2021-04-26
Attending: REGISTERED NURSE
Payer: COMMERCIAL

## 2021-04-26 VITALS
RESPIRATION RATE: 18 BRPM | HEIGHT: 65 IN | HEART RATE: 103 BPM | OXYGEN SATURATION: 97 % | TEMPERATURE: 99 F | WEIGHT: 192 LBS | DIASTOLIC BLOOD PRESSURE: 90 MMHG | SYSTOLIC BLOOD PRESSURE: 140 MMHG | BODY MASS INDEX: 31.99 KG/M2

## 2021-04-26 VITALS — HEIGHT: 65 IN | BODY MASS INDEX: 31.96 KG/M2 | WEIGHT: 191.81 LBS

## 2021-04-26 DIAGNOSIS — N63.0 BREAST LUMP: ICD-10-CM

## 2021-04-26 DIAGNOSIS — N63.0 BREAST LUMP: Primary | ICD-10-CM

## 2021-04-26 PROBLEM — R10.2 PELVIC PAIN IN FEMALE: Status: RESOLVED | Noted: 2017-03-28 | Resolved: 2021-04-26

## 2021-04-26 PROBLEM — R10.32 LLQ PAIN: Status: RESOLVED | Noted: 2017-03-28 | Resolved: 2021-04-26

## 2021-04-26 PROCEDURE — 77065 MAMMO DIGITAL DIAGNOSTIC LEFT WITH TOMO: ICD-10-PCS | Mod: 26,LT,, | Performed by: RADIOLOGY

## 2021-04-26 PROCEDURE — 99999 PR PBB SHADOW E&M-EST. PATIENT-LVL IV: ICD-10-PCS | Mod: PBBFAC,,, | Performed by: REGISTERED NURSE

## 2021-04-26 PROCEDURE — 77061 BREAST TOMOSYNTHESIS UNI: CPT | Mod: 26,LT,, | Performed by: RADIOLOGY

## 2021-04-26 PROCEDURE — 77065 DX MAMMO INCL CAD UNI: CPT | Mod: 26,LT,, | Performed by: RADIOLOGY

## 2021-04-26 PROCEDURE — 77061 BREAST TOMOSYNTHESIS UNI: CPT | Mod: TC,LT

## 2021-04-26 PROCEDURE — 76642 ULTRASOUND BREAST LIMITED: CPT | Mod: TC,LT

## 2021-04-26 PROCEDURE — 99999 PR PBB SHADOW E&M-EST. PATIENT-LVL IV: CPT | Mod: PBBFAC,,, | Performed by: REGISTERED NURSE

## 2021-04-26 PROCEDURE — 76642 ULTRASOUND BREAST LIMITED: CPT | Mod: 26,LT,, | Performed by: RADIOLOGY

## 2021-04-26 PROCEDURE — 76642 US BREAST LEFT LIMITED: ICD-10-PCS | Mod: 26,LT,, | Performed by: RADIOLOGY

## 2021-04-26 PROCEDURE — 99213 OFFICE O/P EST LOW 20 MIN: CPT | Mod: S$GLB,,, | Performed by: REGISTERED NURSE

## 2021-04-26 PROCEDURE — 77061 MAMMO DIGITAL DIAGNOSTIC LEFT WITH TOMO: ICD-10-PCS | Mod: 26,LT,, | Performed by: RADIOLOGY

## 2021-04-26 PROCEDURE — 99213 PR OFFICE/OUTPT VISIT, EST, LEVL III, 20-29 MIN: ICD-10-PCS | Mod: S$GLB,,, | Performed by: REGISTERED NURSE

## 2021-04-26 RX ORDER — GUAIFENESIN 600 MG/1
1200 TABLET, EXTENDED RELEASE ORAL 2 TIMES DAILY
COMMUNITY
End: 2023-03-17

## 2021-04-28 ENCOUNTER — PATIENT MESSAGE (OUTPATIENT)
Dept: RESEARCH | Facility: HOSPITAL | Age: 51
End: 2021-04-28

## 2021-11-19 ENCOUNTER — CLINICAL SUPPORT (OUTPATIENT)
Dept: INTERNAL MEDICINE | Facility: CLINIC | Age: 51
End: 2021-11-19
Payer: COMMERCIAL

## 2021-11-19 ENCOUNTER — OFFICE VISIT (OUTPATIENT)
Dept: INTERNAL MEDICINE | Facility: CLINIC | Age: 51
End: 2021-11-19

## 2021-11-19 VITALS
HEIGHT: 65 IN | SYSTOLIC BLOOD PRESSURE: 134 MMHG | WEIGHT: 191.81 LBS | OXYGEN SATURATION: 99 % | BODY MASS INDEX: 31.96 KG/M2 | RESPIRATION RATE: 16 BRPM | TEMPERATURE: 98 F | DIASTOLIC BLOOD PRESSURE: 86 MMHG | HEART RATE: 98 BPM

## 2021-11-19 DIAGNOSIS — Z00.00 ENCOUNTER FOR PREVENTIVE HEALTH EXAMINATION: Primary | ICD-10-CM

## 2021-11-19 DIAGNOSIS — Z86.16 HISTORY OF 2019 NOVEL CORONAVIRUS DISEASE (COVID-19): ICD-10-CM

## 2021-11-19 DIAGNOSIS — Z00.00 ROUTINE GENERAL MEDICAL EXAMINATION AT A HEALTH CARE FACILITY: Primary | ICD-10-CM

## 2021-11-19 DIAGNOSIS — E03.9 ACQUIRED HYPOTHYROIDISM: ICD-10-CM

## 2021-11-19 DIAGNOSIS — G43.009 MIGRAINE WITHOUT AURA AND WITHOUT STATUS MIGRAINOSUS, NOT INTRACTABLE: ICD-10-CM

## 2021-11-19 LAB
ALBUMIN SERPL BCP-MCNC: 3.9 G/DL (ref 3.5–5.2)
ALP SERPL-CCNC: 96 U/L (ref 55–135)
ALT SERPL W/O P-5'-P-CCNC: 19 U/L (ref 10–44)
ANION GAP SERPL CALC-SCNC: 10 MMOL/L (ref 8–16)
AST SERPL-CCNC: 14 U/L (ref 10–40)
BILIRUB SERPL-MCNC: 0.5 MG/DL (ref 0.1–1)
BUN SERPL-MCNC: 14 MG/DL (ref 6–20)
CALCIUM SERPL-MCNC: 10 MG/DL (ref 8.7–10.5)
CHLORIDE SERPL-SCNC: 102 MMOL/L (ref 95–110)
CHOLEST SERPL-MCNC: 185 MG/DL (ref 120–199)
CHOLEST/HDLC SERPL: 2.6 {RATIO} (ref 2–5)
CO2 SERPL-SCNC: 30 MMOL/L (ref 23–29)
CREAT SERPL-MCNC: 0.8 MG/DL (ref 0.5–1.4)
ERYTHROCYTE [DISTWIDTH] IN BLOOD BY AUTOMATED COUNT: 12.5 % (ref 11.5–14.5)
EST. GFR  (AFRICAN AMERICAN): >60 ML/MIN/1.73 M^2
EST. GFR  (NON AFRICAN AMERICAN): >60 ML/MIN/1.73 M^2
GLUCOSE SERPL-MCNC: 91 MG/DL (ref 70–110)
HCT VFR BLD AUTO: 40.1 % (ref 37–48.5)
HDLC SERPL-MCNC: 72 MG/DL (ref 40–75)
HDLC SERPL: 38.9 % (ref 20–50)
HGB BLD-MCNC: 13.1 G/DL (ref 12–16)
LDLC SERPL CALC-MCNC: 104.8 MG/DL (ref 63–159)
MCH RBC QN AUTO: 30 PG (ref 27–31)
MCHC RBC AUTO-ENTMCNC: 32.7 G/DL (ref 32–36)
MCV RBC AUTO: 92 FL (ref 82–98)
NONHDLC SERPL-MCNC: 113 MG/DL
PLATELET # BLD AUTO: 303 K/UL (ref 150–450)
PMV BLD AUTO: 10.5 FL (ref 9.2–12.9)
POTASSIUM SERPL-SCNC: 5 MMOL/L (ref 3.5–5.1)
PROT SERPL-MCNC: 7.1 G/DL (ref 6–8.4)
RBC # BLD AUTO: 4.37 M/UL (ref 4–5.4)
SODIUM SERPL-SCNC: 142 MMOL/L (ref 136–145)
TRIGL SERPL-MCNC: 41 MG/DL (ref 30–150)
TSH SERPL DL<=0.005 MIU/L-ACNC: 2.19 UIU/ML (ref 0.4–4)
WBC # BLD AUTO: 7.01 K/UL (ref 3.9–12.7)

## 2021-11-19 PROCEDURE — 90686 FLU VACCINE (QUAD) GREATER THAN OR EQUAL TO 3YO PRESERVATIVE FREE IM: ICD-10-PCS | Mod: S$GLB,,, | Performed by: INTERNAL MEDICINE

## 2021-11-19 PROCEDURE — 99396 PR PREVENTIVE VISIT,EST,40-64: ICD-10-PCS | Mod: S$GLB,,, | Performed by: INTERNAL MEDICINE

## 2021-11-19 PROCEDURE — 85027 COMPLETE CBC AUTOMATED: CPT | Performed by: INTERNAL MEDICINE

## 2021-11-19 PROCEDURE — 90471 FLU VACCINE (QUAD) GREATER THAN OR EQUAL TO 3YO PRESERVATIVE FREE IM: ICD-10-PCS | Mod: S$GLB,,, | Performed by: INTERNAL MEDICINE

## 2021-11-19 PROCEDURE — 86803 HEPATITIS C AB TEST: CPT | Performed by: INTERNAL MEDICINE

## 2021-11-19 PROCEDURE — 90471 IMMUNIZATION ADMIN: CPT | Mod: S$GLB,,, | Performed by: INTERNAL MEDICINE

## 2021-11-19 PROCEDURE — 80053 COMPREHEN METABOLIC PANEL: CPT | Performed by: INTERNAL MEDICINE

## 2021-11-19 PROCEDURE — 99999 PR PBB SHADOW E&M-EST. PATIENT-LVL V: CPT | Mod: PBBFAC,,, | Performed by: INTERNAL MEDICINE

## 2021-11-19 PROCEDURE — 86769 SARS-COV-2 COVID-19 ANTIBODY: CPT | Performed by: INTERNAL MEDICINE

## 2021-11-19 PROCEDURE — 84443 ASSAY THYROID STIM HORMONE: CPT | Performed by: INTERNAL MEDICINE

## 2021-11-19 PROCEDURE — 99999 PR PBB SHADOW E&M-EST. PATIENT-LVL V: ICD-10-PCS | Mod: PBBFAC,,, | Performed by: INTERNAL MEDICINE

## 2021-11-19 PROCEDURE — 80061 LIPID PANEL: CPT | Performed by: INTERNAL MEDICINE

## 2021-11-19 PROCEDURE — 99396 PREV VISIT EST AGE 40-64: CPT | Mod: S$GLB,,, | Performed by: INTERNAL MEDICINE

## 2021-11-19 PROCEDURE — 83036 HEMOGLOBIN GLYCOSYLATED A1C: CPT | Performed by: INTERNAL MEDICINE

## 2021-11-19 PROCEDURE — 90686 IIV4 VACC NO PRSV 0.5 ML IM: CPT | Mod: S$GLB,,, | Performed by: INTERNAL MEDICINE

## 2021-11-19 RX ORDER — PHENTERMINE HYDROCHLORIDE 37.5 MG/1
CAPSULE ORAL
COMMUNITY
Start: 2021-08-18 | End: 2023-03-17

## 2021-11-19 RX ORDER — ERGOCALCIFEROL 1.25 MG/1
CAPSULE ORAL
COMMUNITY
Start: 2021-08-18

## 2021-11-20 LAB
ESTIMATED AVG GLUCOSE: 105 MG/DL (ref 68–131)
HBA1C MFR BLD: 5.3 % (ref 4–5.6)
SARS-COV-2 IGG SERPL IA-ACNC: 1301.7 AU/ML
SARS-COV-2 IGG SERPL QL IA: POSITIVE

## 2021-11-22 LAB — HCV AB SERPL QL IA: NEGATIVE

## 2022-03-23 ENCOUNTER — PATIENT MESSAGE (OUTPATIENT)
Dept: RESEARCH | Facility: HOSPITAL | Age: 52
End: 2022-03-23
Payer: COMMERCIAL

## 2022-08-09 ENCOUNTER — PATIENT MESSAGE (OUTPATIENT)
Dept: ADMINISTRATIVE | Facility: HOSPITAL | Age: 52
End: 2022-08-09
Payer: COMMERCIAL

## 2023-02-07 DIAGNOSIS — Z00.00 ROUTINE GENERAL MEDICAL EXAMINATION AT A HEALTH CARE FACILITY: Primary | ICD-10-CM

## 2023-02-08 ENCOUNTER — TELEPHONE (OUTPATIENT)
Dept: CARDIOLOGY | Facility: HOSPITAL | Age: 53
End: 2023-02-08
Payer: COMMERCIAL

## 2023-03-17 ENCOUNTER — HOSPITAL ENCOUNTER (OUTPATIENT)
Dept: RADIOLOGY | Facility: HOSPITAL | Age: 53
Discharge: HOME | End: 2023-03-17
Attending: FAMILY MEDICINE

## 2023-03-17 ENCOUNTER — OFFICE VISIT (OUTPATIENT)
Dept: OBSTETRICS AND GYNECOLOGY | Facility: CLINIC | Age: 53
End: 2023-03-17
Payer: COMMERCIAL

## 2023-03-17 ENCOUNTER — OFFICE VISIT (OUTPATIENT)
Dept: INTERNAL MEDICINE | Facility: CLINIC | Age: 53
End: 2023-03-17

## 2023-03-17 ENCOUNTER — CLINICAL SUPPORT (OUTPATIENT)
Dept: AUDIOLOGY | Facility: CLINIC | Age: 53
End: 2023-03-17

## 2023-03-17 ENCOUNTER — CLINICAL SUPPORT (OUTPATIENT)
Dept: INTERNAL MEDICINE | Facility: CLINIC | Age: 53
End: 2023-03-17

## 2023-03-17 ENCOUNTER — CLINICAL SUPPORT (OUTPATIENT)
Dept: REHABILITATION | Facility: HOSPITAL | Age: 53
End: 2023-03-17

## 2023-03-17 ENCOUNTER — CLINICAL SUPPORT (OUTPATIENT)
Dept: INTERNAL MEDICINE | Facility: CLINIC | Age: 53
End: 2023-03-17
Payer: COMMERCIAL

## 2023-03-17 ENCOUNTER — HOSPITAL ENCOUNTER (OUTPATIENT)
Dept: CARDIOLOGY | Facility: HOSPITAL | Age: 53
Discharge: HOME | End: 2023-03-17
Attending: FAMILY MEDICINE

## 2023-03-17 VITALS
HEIGHT: 65 IN | WEIGHT: 165.13 LBS | DIASTOLIC BLOOD PRESSURE: 70 MMHG | SYSTOLIC BLOOD PRESSURE: 110 MMHG | WEIGHT: 165.13 LBS | HEIGHT: 65 IN | BODY MASS INDEX: 27.51 KG/M2 | BODY MASS INDEX: 27.51 KG/M2

## 2023-03-17 VITALS
OXYGEN SATURATION: 99 % | HEIGHT: 65 IN | TEMPERATURE: 98 F | HEART RATE: 76 BPM | DIASTOLIC BLOOD PRESSURE: 66 MMHG | RESPIRATION RATE: 20 BRPM | BODY MASS INDEX: 26.99 KG/M2 | SYSTOLIC BLOOD PRESSURE: 102 MMHG | WEIGHT: 162 LBS

## 2023-03-17 DIAGNOSIS — Z01.12 HEARING CONSERVATION AND TREATMENT EXAM: Primary | ICD-10-CM

## 2023-03-17 DIAGNOSIS — R73.03 PREDIABETES: ICD-10-CM

## 2023-03-17 DIAGNOSIS — Z00.00 ROUTINE GENERAL MEDICAL EXAMINATION AT A HEALTH CARE FACILITY: Primary | ICD-10-CM

## 2023-03-17 DIAGNOSIS — Z00.00 ROUTINE GENERAL MEDICAL EXAMINATION AT A HEALTH CARE FACILITY: ICD-10-CM

## 2023-03-17 DIAGNOSIS — Z01.419 WELL WOMAN EXAM WITH ROUTINE GYNECOLOGICAL EXAM: Primary | ICD-10-CM

## 2023-03-17 DIAGNOSIS — I10 PRIMARY HYPERTENSION: ICD-10-CM

## 2023-03-17 DIAGNOSIS — E03.9 ACQUIRED HYPOTHYROIDISM: ICD-10-CM

## 2023-03-17 PROBLEM — N94.6 DYSMENORRHEA: Status: RESOLVED | Noted: 2017-03-28 | Resolved: 2023-03-17

## 2023-03-17 PROBLEM — N80.03 ADENOMYOSIS: Status: RESOLVED | Noted: 2017-03-28 | Resolved: 2023-03-17

## 2023-03-17 LAB
ALBUMIN SERPL BCP-MCNC: 3.6 G/DL (ref 3.5–5.2)
ALP SERPL-CCNC: 72 U/L (ref 55–135)
ALT SERPL W/O P-5'-P-CCNC: 13 U/L (ref 10–44)
ANION GAP SERPL CALC-SCNC: 11 MMOL/L (ref 8–16)
AST SERPL-CCNC: 12 U/L (ref 10–40)
BILIRUB SERPL-MCNC: 0.6 MG/DL (ref 0.1–1)
BUN SERPL-MCNC: 16 MG/DL (ref 6–20)
CALCIUM SERPL-MCNC: 9.5 MG/DL (ref 8.7–10.5)
CHLORIDE SERPL-SCNC: 106 MMOL/L (ref 95–110)
CHOLEST SERPL-MCNC: 159 MG/DL (ref 120–199)
CHOLEST/HDLC SERPL: 2.8 {RATIO} (ref 2–5)
CO2 SERPL-SCNC: 24 MMOL/L (ref 23–29)
CREAT SERPL-MCNC: 0.7 MG/DL (ref 0.5–1.4)
ERYTHROCYTE [DISTWIDTH] IN BLOOD BY AUTOMATED COUNT: 12.4 % (ref 11.5–14.5)
EST. GFR  (NO RACE VARIABLE): >60 ML/MIN/1.73 M^2
ESTIMATED AVG GLUCOSE: 100 MG/DL (ref 68–131)
GLUCOSE SERPL-MCNC: 84 MG/DL (ref 70–110)
HBA1C MFR BLD: 5.1 % (ref 4–5.6)
HCT VFR BLD AUTO: 36.6 % (ref 37–48.5)
HDLC SERPL-MCNC: 56 MG/DL (ref 40–75)
HDLC SERPL: 35.2 % (ref 20–50)
HGB BLD-MCNC: 12.1 G/DL (ref 12–16)
LDLC SERPL CALC-MCNC: 91.8 MG/DL (ref 63–159)
MCH RBC QN AUTO: 30.5 PG (ref 27–31)
MCHC RBC AUTO-ENTMCNC: 33.1 G/DL (ref 32–36)
MCV RBC AUTO: 92 FL (ref 82–98)
NONHDLC SERPL-MCNC: 103 MG/DL
PLATELET # BLD AUTO: 293 K/UL (ref 150–450)
PMV BLD AUTO: 11.2 FL (ref 9.2–12.9)
POTASSIUM SERPL-SCNC: 4.1 MMOL/L (ref 3.5–5.1)
PROT SERPL-MCNC: 6.9 G/DL (ref 6–8.4)
RBC # BLD AUTO: 3.97 M/UL (ref 4–5.4)
SODIUM SERPL-SCNC: 141 MMOL/L (ref 136–145)
TRIGL SERPL-MCNC: 56 MG/DL (ref 30–150)
TSH SERPL DL<=0.005 MIU/L-ACNC: 0.56 UIU/ML (ref 0.4–4)
WBC # BLD AUTO: 7.48 K/UL (ref 3.9–12.7)

## 2023-03-17 PROCEDURE — 99999 PR PBB SHADOW E&M-EST. PATIENT-LVL III: CPT | Mod: PBBFAC,,, | Performed by: NURSE PRACTITIONER

## 2023-03-17 PROCEDURE — 77063 MAMMO DIGITAL SCREENING BILAT WITH TOMO: ICD-10-PCS | Mod: 26,,, | Performed by: RADIOLOGY

## 2023-03-17 PROCEDURE — 77067 SCR MAMMO BI INCL CAD: CPT | Mod: TC

## 2023-03-17 PROCEDURE — 77067 MAMMO DIGITAL SCREENING BILAT WITH TOMO: ICD-10-PCS | Mod: 26,,, | Performed by: RADIOLOGY

## 2023-03-17 PROCEDURE — 97750 PHYSICAL PERFORMANCE TEST: CPT | Performed by: PHYSICAL THERAPIST

## 2023-03-17 PROCEDURE — 99999 PR PBB SHADOW E&M-EST. PATIENT-LVL IV: ICD-10-PCS | Mod: PBBFAC,,, | Performed by: FAMILY MEDICINE

## 2023-03-17 PROCEDURE — 83036 HEMOGLOBIN GLYCOSYLATED A1C: CPT | Performed by: FAMILY MEDICINE

## 2023-03-17 PROCEDURE — 99396 PREV VISIT EST AGE 40-64: CPT | Mod: S$GLB,,, | Performed by: NURSE PRACTITIONER

## 2023-03-17 PROCEDURE — 77067 SCR MAMMO BI INCL CAD: CPT | Mod: 26,,, | Performed by: RADIOLOGY

## 2023-03-17 PROCEDURE — 93016 EXERCISE STRESS - EKG (CUPID ONLY): ICD-10-PCS | Mod: ,,, | Performed by: STUDENT IN AN ORGANIZED HEALTH CARE EDUCATION/TRAINING PROGRAM

## 2023-03-17 PROCEDURE — 99999 PR PBB SHADOW E&M-EST. PATIENT-LVL IV: CPT | Mod: PBBFAC,,, | Performed by: FAMILY MEDICINE

## 2023-03-17 PROCEDURE — 92552 PR PURE TONE AUDIOMETRY, AIR: ICD-10-PCS | Mod: S$GLB,,,

## 2023-03-17 PROCEDURE — 99999 PR PBB SHADOW E&M-EST. PATIENT-LVL I: CPT | Mod: PBBFAC,,,

## 2023-03-17 PROCEDURE — 93016 CV STRESS TEST SUPVJ ONLY: CPT | Mod: ,,, | Performed by: STUDENT IN AN ORGANIZED HEALTH CARE EDUCATION/TRAINING PROGRAM

## 2023-03-17 PROCEDURE — 99211 PR NURSE VISIT, 15 MINS, EXEC HLTH ONLY: ICD-10-PCS | Mod: S$GLB,,, | Performed by: INTERNAL MEDICINE

## 2023-03-17 PROCEDURE — 93017 CV STRESS TEST TRACING ONLY: CPT

## 2023-03-17 PROCEDURE — 80061 LIPID PANEL: CPT | Performed by: FAMILY MEDICINE

## 2023-03-17 PROCEDURE — 93018 EXERCISE STRESS - EKG (CUPID ONLY): ICD-10-PCS | Mod: ,,, | Performed by: STUDENT IN AN ORGANIZED HEALTH CARE EDUCATION/TRAINING PROGRAM

## 2023-03-17 PROCEDURE — 80053 COMPREHEN METABOLIC PANEL: CPT | Performed by: FAMILY MEDICINE

## 2023-03-17 PROCEDURE — 92552 PURE TONE AUDIOMETRY AIR: CPT | Mod: S$GLB,,,

## 2023-03-17 PROCEDURE — 99396 PR PREVENTIVE VISIT,EST,40-64: ICD-10-PCS | Mod: S$GLB,,, | Performed by: FAMILY MEDICINE

## 2023-03-17 PROCEDURE — 99999 PR PBB SHADOW E&M-EST. PATIENT-LVL I: ICD-10-PCS | Mod: PBBFAC,,,

## 2023-03-17 PROCEDURE — 84443 ASSAY THYROID STIM HORMONE: CPT | Performed by: FAMILY MEDICINE

## 2023-03-17 PROCEDURE — 99999 PR PBB SHADOW E&M-EST. PATIENT-LVL III: ICD-10-PCS | Mod: PBBFAC,,, | Performed by: NURSE PRACTITIONER

## 2023-03-17 PROCEDURE — 99396 PR PREVENTIVE VISIT,EST,40-64: ICD-10-PCS | Mod: S$GLB,,, | Performed by: NURSE PRACTITIONER

## 2023-03-17 PROCEDURE — 99211 OFF/OP EST MAY X REQ PHY/QHP: CPT | Mod: S$GLB,,, | Performed by: INTERNAL MEDICINE

## 2023-03-17 PROCEDURE — 77063 BREAST TOMOSYNTHESIS BI: CPT | Mod: 26,,, | Performed by: RADIOLOGY

## 2023-03-17 PROCEDURE — 93018 CV STRESS TEST I&R ONLY: CPT | Mod: ,,, | Performed by: STUDENT IN AN ORGANIZED HEALTH CARE EDUCATION/TRAINING PROGRAM

## 2023-03-17 PROCEDURE — 85027 COMPLETE CBC AUTOMATED: CPT | Performed by: FAMILY MEDICINE

## 2023-03-17 PROCEDURE — 99396 PREV VISIT EST AGE 40-64: CPT | Mod: S$GLB,,, | Performed by: FAMILY MEDICINE

## 2023-03-17 RX ORDER — SEMAGLUTIDE 2.68 MG/ML
2 INJECTION, SOLUTION SUBCUTANEOUS
COMMUNITY
Start: 2023-02-28

## 2023-03-17 NOTE — PROGRESS NOTES
Subjective:       Patient ID: Bing Mclaughlin is a 52 y.o. female.    Chief Complaint:  Well Woman    Patient's last menstrual period was 2017.  History of Present Illness  Annual Exam-Postmenopausal  Patient presents for annual exam. The patient has no complaints today. The patient is sexually active. GYN screening history: last mammogram: approximate date 2021 and was normal. Annual mmg today.  Hysterectomy with LSO, right ovary remains, benign indications.  The patient is not taking hormone replacement therapy. Patient denies post-menopausal vaginal bleeding. The patient wears seatbelts: yes. The patient participates in regular exercise: yes. Has the patient ever been transfused or tattooed?: yes. The patient reports that there is not domestic violence in her life.    OB History    Para Term  AB Living   3 3 2 1   2   SAB IAB Ectopic Multiple Live Births           2      # Outcome Date GA Lbr Kei/2nd Weight Sex Delivery Anes PTL Lv   3 Term            2 Term     F Vag-Spont   CAITLIN   1      M Vag-Spont   CAITLIN       Review of Systems  Review of Systems   Constitutional:  Negative for appetite change, fatigue, fever and unexpected weight change.   Eyes:  Negative for visual disturbance.   Cardiovascular:  Negative for chest pain.   Gastrointestinal:  Negative for abdominal pain, bloating, constipation, diarrhea, nausea and vomiting.   Genitourinary:  Negative for bladder incontinence, dysmenorrhea, dyspareunia, dysuria, flank pain, frequency, genital sores, menorrhagia, menstrual problem, pelvic pain, urgency, vaginal bleeding, vaginal discharge, vaginal pain, postcoital bleeding, vaginal dryness and vaginal odor.   Integumentary:  Negative for rash, acne, mole/lesion, breast mass, nipple discharge, breast skin changes and breast tenderness.   Neurological:  Negative for syncope and headaches.   Hematological:  Negative for adenopathy. Does not bruise/bleed easily.   All other systems  reviewed and are negative.  Breast: Positive for breast self exam.Negative for asymmetry, lump, mass, nipple discharge, skin changes and tenderness         Objective:      Physical Exam:   Constitutional: She is oriented to person, place, and time. She appears well-developed and well-nourished.    HENT:   Head: Normocephalic and atraumatic.   Nose: Nose normal.    Eyes: Pupils are equal, round, and reactive to light. Conjunctivae and EOM are normal.     Cardiovascular:  Normal rate and regular rhythm.             Pulmonary/Chest: Effort normal. Right breast exhibits no inverted nipple, no mass, no nipple discharge, no skin change, no tenderness, no bleeding and no swelling. Left breast exhibits no inverted nipple, no mass, no nipple discharge, no skin change, no tenderness, no bleeding and no swelling. Breasts are symmetrical.        Abdominal: Soft. She exhibits no distension. There is no abdominal tenderness. Hernia confirmed negative in the right inguinal area and confirmed negative in the left inguinal area.     Genitourinary:    Inguinal canal, vagina, right adnexa and rectum normal.      Pelvic exam was performed with patient supine.   The external female genitalia was normal.   Genitalia hair distrobution normal .   Labial bartholins normal.There is no rash, tenderness, lesion or injury on the right labia. There is no rash, tenderness, lesion or injury on the left labia. There is an absent left adnexa. Vaginal cuff normal.  No erythema,  no vaginal discharge, rectocele or cystocele in the vagina. Cervix is absent.Uterus is absent.    Genitourinary Comments: Vagina atrophic.             Musculoskeletal: Normal range of motion and moves all extremeties.      Lymphadenopathy: No inguinal adenopathy noted on the right or left side.    Neurological: She is alert and oriented to person, place, and time.    Skin: Skin is warm and dry. She is not diaphoretic.    Psychiatric: She has a normal mood and affect. Her  behavior is normal. Judgment and thought content normal.          Assessment:     1. Well woman exam with routine gynecological exam              Plan:   Bing was seen today for well woman.    Diagnoses and all orders for this visit:    Well woman exam with routine gynecological exam      Follow up with me in 1 year for annual well woman exam.

## 2023-03-17 NOTE — PROGRESS NOTES
"Subjective:       Patient ID: Bing Mclaughlin is a 52 y.o. female.    Chief Complaint: Executive Health     52-year-old female patient here for executive health physical.  Patient with Patient Active Problem List:     Acquired hypothyroidism     Status post laparoscopic hysterectomy     Lumbar radiculopathy     Prediabetes     Primary hypertension   Reports that she is been taking her medications regularly and has been followed by her PCP   Patient has been taking naproxen regularly for neck pain secondary to underlying arthritis, denies any  extreme fatigue   Since taking Ozempic patient has decreased the dosage on thyroid medication and has been having low blood pressure but continues to take hydrochlorothiazide 25 mg daily has been biking 3-5 times  weekly    Review of Systems   Constitutional:  Negative for fatigue.   HENT:  Negative for trouble swallowing and voice change.    Eyes:  Negative for visual disturbance.   Respiratory:  Negative for shortness of breath.    Cardiovascular:  Negative for chest pain and leg swelling.   Gastrointestinal:  Negative for abdominal pain, nausea and vomiting.   Musculoskeletal:  Negative for myalgias.   Skin:  Negative for rash.   Neurological:  Negative for light-headedness and headaches.   Psychiatric/Behavioral:  Negative for sleep disturbance.        /66   Pulse 76   Temp 97.5 °F (36.4 °C)   Resp 20   Ht 5' 5" (1.651 m)   Wt 73.5 kg (162 lb)   LMP 02/25/2017   SpO2 99%   BMI 26.96 kg/m²   Objective:      Physical Exam  Constitutional:       Appearance: She is well-developed.   HENT:      Head: Normocephalic and atraumatic.   Cardiovascular:      Rate and Rhythm: Normal rate and regular rhythm.      Heart sounds: Normal heart sounds. No murmur heard.  Pulmonary:      Effort: Pulmonary effort is normal.      Breath sounds: Normal breath sounds. No wheezing.   Abdominal:      General: Bowel sounds are normal.      Palpations: Abdomen is soft.      " Tenderness: There is no abdominal tenderness.   Skin:     General: Skin is warm and dry.      Findings: No rash.   Neurological:      Mental Status: She is alert and oriented to person, place, and time.   Psychiatric:         Mood and Affect: Mood normal.         Assessment/Plan:   1. Routine general medical examination at a health care facility  Vital signs stable today.  Clinical exam stable  Continue lifestyle modifications with low-fat and low-cholesterol diet and exercise 30 minutes daily    2. Acquired hypothyroidism   Currently taking levothyroxine 150 mcg and Cytomel 5 mcg daily    3. Prediabetes   Stable on Ozempic 2 mg weekly    4. Primary hypertension   Secondary to low blood pressure patient was advised to take half tablet of hydrochlorothiazide 25 mg daily and continue to monitor blood pressure trends and discuss further with PCP

## 2023-03-17 NOTE — PROGRESS NOTES
:  68    DX: Z00.0  Orders:  Fitness Evaluation    An [facility] Fitness Component evaluation was completed.  Results are as follows:    Height (in):    65                            Weight (lbs):    162                                    BMI:   27    Resting Energy Expenditure:         1346       kcal/24 hrs.                             Body Composition:          34.07  % body fat             Rating: Good to Fair    Waist to Hip Ratio:     0.71                    Risk:  Low   Hip taken over clothing:      Yes          Muscular Strength and Endurance Assessment:               Strength (lbs):     Right: 54.7             Rating:  average      Left:  48.7           Rating: average   Push-ups:   3                 Rating:  fair   Curl-ups :   27                Rating:  above average    Flexibility Testing:   Sit and Reach (cm):    35.5                       Rating:  excellent    Pt reports having lost 200# over the past year.  Started taking Ozempic and was subsequently able to decrease her thyroid medicine.  Feeling better in general.  Likes to ride bike, starting to get back with her cycling.  3 days/week 5-10 miles.  Encouraged to add some resistance work.  Has a stand up desk at work, she does walk around frequently.       Date 3/17/2023   Height (in): 65   Weight: 162   BMI: 27.01   Body Fat %: 34.07   Waist (cm): 80   Hip (cm): 112.5   WHR: 0.71   RBP: 102/66   RHR: 76    Rt (lbs): 55    Lt (lbs): 49   Push-up  3   Curl-up  27   Flexibility  36   REE  (Kcals) 1346         The patient completed the testing procedures without complications.

## 2023-03-17 NOTE — PROGRESS NOTES
Executive Health Screening    Bing Mclaughlin was seen 03/17/2023 for an executive health hearing screen.  Results revealed normal hearing sensitivity 250-8000 Hz, bilaterally.  Otoscopy was within normal limits, bilaterally.    Recommendations:  1. Wear Hearing Protective Devices around loud noises

## 2023-03-18 LAB
CV STRESS BASE HR: 67 BPM
DIASTOLIC BLOOD PRESSURE: 78 MMHG
OHS CV CPX 1 MINUTE RECOVERY HEART RATE: 146 BPM
OHS CV CPX 85 PERCENT MAX PREDICTED HEART RATE MALE: 135
OHS CV CPX ESTIMATED METS: 10
OHS CV CPX MAX PREDICTED HEART RATE: 159
OHS CV CPX PATIENT IS FEMALE: 1
OHS CV CPX PATIENT IS MALE: 0
OHS CV CPX PEAK DIASTOLIC BLOOD PRESSURE: 72 MMHG
OHS CV CPX PEAK HEAR RATE: 164 BPM
OHS CV CPX PEAK RATE PRESSURE PRODUCT: NORMAL
OHS CV CPX PEAK SYSTOLIC BLOOD PRESSURE: 163 MMHG
OHS CV CPX PERCENT MAX PREDICTED HEART RATE ACHIEVED: 103
OHS CV CPX RATE PRESSURE PRODUCT PRESENTING: 7638
STRESS ECHO POST EXERCISE DUR MIN: 7 MINUTES
STRESS ECHO POST EXERCISE DUR SEC: 37 SECONDS
SYSTOLIC BLOOD PRESSURE: 114 MMHG

## 2023-05-30 ENCOUNTER — OFFICE VISIT (OUTPATIENT)
Dept: URGENT CARE | Facility: CLINIC | Age: 53
End: 2023-05-30
Payer: COMMERCIAL

## 2023-05-30 VITALS
OXYGEN SATURATION: 100 % | TEMPERATURE: 98 F | SYSTOLIC BLOOD PRESSURE: 120 MMHG | HEART RATE: 80 BPM | DIASTOLIC BLOOD PRESSURE: 59 MMHG | HEIGHT: 65 IN | RESPIRATION RATE: 18 BRPM | BODY MASS INDEX: 27.16 KG/M2 | WEIGHT: 163 LBS

## 2023-05-30 DIAGNOSIS — J06.9 VIRAL URI WITH COUGH: Primary | ICD-10-CM

## 2023-05-30 DIAGNOSIS — J32.9 SINUSITIS, UNSPECIFIED CHRONICITY, UNSPECIFIED LOCATION: ICD-10-CM

## 2023-05-30 PROCEDURE — 99213 OFFICE O/P EST LOW 20 MIN: CPT | Mod: S$GLB,,, | Performed by: NURSE PRACTITIONER

## 2023-05-30 PROCEDURE — 99213 PR OFFICE/OUTPT VISIT, EST, LEVL III, 20-29 MIN: ICD-10-PCS | Mod: S$GLB,,, | Performed by: NURSE PRACTITIONER

## 2023-05-30 RX ORDER — PROMETHAZINE HYDROCHLORIDE AND DEXTROMETHORPHAN HYDROBROMIDE 6.25; 15 MG/5ML; MG/5ML
5 SYRUP ORAL EVERY 8 HOURS PRN
Qty: 118 ML | Refills: 0 | Status: SHIPPED | OUTPATIENT
Start: 2023-05-30 | End: 2023-06-09

## 2023-05-30 NOTE — PROGRESS NOTES
"Subjective:      Patient ID: Bing Mclauglhin is a 52 y.o. female.    Vitals:  height is 5' 5" (1.651 m) and weight is 73.9 kg (163 lb). Her temperature is 97.9 °F (36.6 °C). Her blood pressure is 120/59 (abnormal) and her pulse is 80. Her respiration is 18 and oxygen saturation is 100%.     Chief Complaint: Sinus Problem        Patient is a 52 year old female who presents to urgent care for evaluation of sinus congestion. Associated symptoms include cough up sputum, sinus pressure and headache. She denies fever or body aches.     Sinus Problem  This is a new problem. The current episode started in the past 7 days. The problem has been gradually worsening since onset. There has been no fever. Her pain is at a severity of 0/10. Associated symptoms include chills, congestion, coughing (greenish sputum), ear pain (left ear at night), headaches, a hoarse voice, neck pain (back of neck) and sinus pressure. Pertinent negatives include no shortness of breath or sneezing. Treatments tried: mucinex, aleve. The treatment provided no relief.     Constitution: Positive for chills. Negative for fever.   HENT:  Positive for ear pain (left ear at night), congestion and sinus pressure.    Neck: Positive for neck pain (back of neck).   Respiratory:  Positive for cough (greenish sputum). Negative for shortness of breath.    Musculoskeletal:  Negative for muscle ache.   Allergic/Immunologic: Negative for sneezing.   Neurological:  Positive for headaches.    Objective:     Physical Exam   Constitutional: She is oriented to person, place, and time. She appears well-developed. She is cooperative.  Non-toxic appearance. She does not appear ill. No distress.   HENT:   Head: Normocephalic and atraumatic.   Ears:   Right Ear: Hearing, external ear and ear canal normal. Tympanic membrane is not bulging.   Left Ear: Hearing, tympanic membrane, external ear and ear canal normal. Tympanic membrane is not erythematous and not bulging.   Nose: " No mucosal edema, rhinorrhea or nasal deformity. No epistaxis. Right sinus exhibits maxillary sinus tenderness. Right sinus exhibits no frontal sinus tenderness. Left sinus exhibits maxillary sinus tenderness. Left sinus exhibits no frontal sinus tenderness.   Mouth/Throat: Uvula is midline, oropharynx is clear and moist and mucous membranes are normal. No trismus in the jaw. Normal dentition. No uvula swelling. No oropharyngeal exudate, posterior oropharyngeal edema, posterior oropharyngeal erythema, tonsillar abscesses or cobblestoning. Tonsils are 0 on the right. Tonsils are 0 on the left. No tonsillar exudate.   Eyes: Conjunctivae and lids are normal. No scleral icterus.   Neck: Trachea normal and phonation normal. Neck supple. No edema present. No erythema present. No neck rigidity present.   Cardiovascular: Normal rate, regular rhythm, normal heart sounds and normal pulses.   Pulmonary/Chest: Effort normal and breath sounds normal. No respiratory distress. She has no decreased breath sounds. She has no rhonchi.   Abdominal: Normal appearance.   Musculoskeletal: Normal range of motion.         General: No deformity. Normal range of motion.   Neurological: She is alert and oriented to person, place, and time. She exhibits normal muscle tone. Coordination normal.   Skin: Skin is warm, dry, intact, not diaphoretic and not pale.   Psychiatric: Her speech is normal and behavior is normal. Judgment and thought content normal.   Nursing note and vitals reviewed.    Assessment:     1. Viral URI with cough    2. Sinusitis, unspecified chronicity, unspecified location        Plan:       Viral URI with cough  -     promethazine-dextromethorphan (PROMETHAZINE-DM) 6.25-15 mg/5 mL Syrp; Take 5 mLs by mouth every 8 (eight) hours as needed (cough).  Dispense: 118 mL; Refill: 0    Sinusitis, unspecified chronicity, unspecified location          Medical Decision Making:   Initial Assessment:       Patient presents to urgent care  for evaluation of sinus congestion. Associated symptoms include coughing up sputum, sinus pressure and headache. She denies associated symptoms fever or body aches. Symptoms started about one week ago.   Differential Diagnosis:   Seasonal allergies, URI, COVID, environmental allergies    Urgent Care Management:      Patient presents for evaluation of sinus congestion associated with coughing up mucus, sinus pressure and headache. Denies fever or body. Symptoms started about one week ago. Recommend continuing Mucinex, Flonase and antihistamine. Can start   Sudafed or Pseudoephedrine as needed.            PLEASE READ YOUR DISCHARGE INSTRUCTIONS ENTIRELY AS IT CONTAINS IMPORTANT INFORMATION.      Please drink plenty of fluids.    Please get plenty of rest.    Please return here or go to the Emergency Department for any concerns or worsening of condition.    Please take an over the counter antihistamine medication (allegra/Claritin/Zyrtec) of your choice as directed.    Try an over the counter decongestant like Mucinex D or Sudafed.    Pseudoephedrine, you buy this behind the pharmacy counter    If you do have Hypertension or palpitations, it is safe to take Coricidin HBP for relief of sinus symptoms.    If not allergic, please take over the counter Tylenol (Acetaminophen) and/or Motrin (Ibuprofen) as directed for control of pain and/or fever.  Please follow up with your primary care doctor or specialist as needed.    Sore throat recommendations: Warm fluids, warm salt water gargles, throat lozenges, tea, honey, soup, rest, hydration.    Use over the counter flonase: one spray each nostril twice daily OR two sprays each nostril once daily.     Sinus rinses DO NOT USE TAP WATER, if you must, water must be a rolling boil for 1 minute, let it cool, then use.  May use distilled water, or over the counter nasal saline rinses.  Vics vapor rub in shower to help open nasal passages.  May use nasal gel to keep passages  moisturized.  May use Nasal saline sprays during the day for added relief of congestion.   For those who go to the gym, please do not use the sauna or steam room now to clear sinuses.    If you  smoke, please stop smoking.      Please return or see your primary care doctor if you develop new or worsening symptoms.     Please arrange follow up with your primary medical clinic as soon as possible. You must understand that you've received an Urgent Care treatment only and that you may be released before all of your medical problems are known or treated. You, the patient, will arrange for follow up as instructed. If your symptoms worsen or fail to improve you should go to the Emergency Room.

## 2023-06-02 ENCOUNTER — TELEPHONE (OUTPATIENT)
Dept: URGENT CARE | Facility: CLINIC | Age: 53
End: 2023-06-02
Payer: COMMERCIAL

## 2023-06-02 NOTE — TELEPHONE ENCOUNTER
Gave a patient a call to see if she's feeling better. Patient states she is not feeling better. She's taking medication, but it's not subsiding. I told patient to feel free to come back to the clinic for reevaluation. She stated thank you for calling her.

## 2024-01-29 ENCOUNTER — HOSPITAL ENCOUNTER (OUTPATIENT)
Dept: RADIOLOGY | Facility: CLINIC | Age: 54
Discharge: HOME | End: 2024-01-29
Attending: PHYSICIAN ASSISTANT
Payer: COMMERCIAL

## 2024-01-29 ENCOUNTER — OFFICE VISIT (OUTPATIENT)
Dept: URGENT CARE | Facility: CLINIC | Age: 54
End: 2024-01-29
Payer: COMMERCIAL

## 2024-01-29 VITALS
BODY MASS INDEX: 28.32 KG/M2 | WEIGHT: 170 LBS | OXYGEN SATURATION: 100 % | HEIGHT: 65 IN | TEMPERATURE: 98 F | SYSTOLIC BLOOD PRESSURE: 98 MMHG | DIASTOLIC BLOOD PRESSURE: 54 MMHG | RESPIRATION RATE: 18 BRPM | HEART RATE: 70 BPM

## 2024-01-29 DIAGNOSIS — R05.9 COUGH, UNSPECIFIED TYPE: ICD-10-CM

## 2024-01-29 DIAGNOSIS — R42 LIGHTHEADED: ICD-10-CM

## 2024-01-29 DIAGNOSIS — J40 BRONCHITIS: Primary | ICD-10-CM

## 2024-01-29 LAB
CTP QC/QA: YES
GLUCOSE SERPL-MCNC: 100 MG/DL (ref 70–110)
SARS-COV-2 AG RESP QL IA.RAPID: NEGATIVE

## 2024-01-29 PROCEDURE — 71046 X-RAY EXAM CHEST 2 VIEWS: CPT | Mod: S$GLB,,, | Performed by: RADIOLOGY

## 2024-01-29 PROCEDURE — 94640 AIRWAY INHALATION TREATMENT: CPT | Mod: S$GLB,,, | Performed by: PHYSICIAN ASSISTANT

## 2024-01-29 PROCEDURE — 99214 OFFICE O/P EST MOD 30 MIN: CPT | Mod: 25,S$GLB,, | Performed by: PHYSICIAN ASSISTANT

## 2024-01-29 PROCEDURE — 82962 GLUCOSE BLOOD TEST: CPT | Mod: S$GLB,,, | Performed by: PHYSICIAN ASSISTANT

## 2024-01-29 PROCEDURE — 96372 THER/PROPH/DIAG INJ SC/IM: CPT | Mod: 59,S$GLB,, | Performed by: EMERGENCY MEDICINE

## 2024-01-29 PROCEDURE — 87811 SARS-COV-2 COVID19 W/OPTIC: CPT | Mod: QW,S$GLB,, | Performed by: PHYSICIAN ASSISTANT

## 2024-01-29 RX ORDER — PROMETHAZINE HYDROCHLORIDE AND DEXTROMETHORPHAN HYDROBROMIDE 6.25; 15 MG/5ML; MG/5ML
5 SYRUP ORAL EVERY 4 HOURS PRN
Qty: 118 ML | Refills: 0 | Status: SHIPPED | OUTPATIENT
Start: 2024-01-29 | End: 2024-02-08

## 2024-01-29 RX ORDER — ALBUTEROL SULFATE 0.83 MG/ML
2.5 SOLUTION RESPIRATORY (INHALATION)
Status: COMPLETED | OUTPATIENT
Start: 2024-01-29 | End: 2024-01-29

## 2024-01-29 RX ORDER — ALBUTEROL SULFATE 0.83 MG/ML
2.5 SOLUTION RESPIRATORY (INHALATION) EVERY 6 HOURS PRN
Qty: 3 EACH | Refills: 0 | Status: SHIPPED | OUTPATIENT
Start: 2024-01-29 | End: 2025-01-28

## 2024-01-29 RX ADMIN — ALBUTEROL SULFATE 2.5 MG: 0.83 SOLUTION RESPIRATORY (INHALATION) at 09:01

## 2024-01-29 NOTE — PROGRESS NOTES
"Subjective:      Patient ID: Bing Mclaughlin is a 53 y.o. female.    Vitals:  height is 5' 5" (1.651 m) and weight is 77.1 kg (170 lb). Her oral temperature is 98 °F (36.7 °C). Her blood pressure is 110/56 (abnormal) and her pulse is 71. Her respiration is 18 and oxygen saturation is 100%.     Chief Complaint: Cough    Patient presents with a cough, chest congestion, and headache that began 5 days ago. She has been taking OTC Delsym-DM. She was using an old Promethazine Rx, but it ran out. She has been having difficult sleeping at night. She notes she can taste the sputum when she coughs. She has a frequent history of bronchitis.    Cough  This is a new problem. The current episode started in the past 7 days. The problem has been gradually worsening. The cough is Productive of sputum. Associated symptoms include headaches. Pertinent negatives include no chest pain, chills, ear congestion, ear pain, fever, heartburn, hemoptysis, myalgias, nasal congestion, postnasal drip, rash, rhinorrhea, sore throat, shortness of breath, sweats, weight loss or wheezing. The symptoms are aggravated by lying down. She has tried OTC cough suppressant for the symptoms. Her past medical history is significant for bronchitis and environmental allergies. There is no history of asthma, bronchiectasis, COPD, emphysema or pneumonia.       Constitution: Negative for chills and fever.   HENT:  Negative for ear pain, postnasal drip and sore throat.    Cardiovascular:  Negative for chest pain.   Respiratory:  Positive for cough. Negative for bloody sputum, shortness of breath and wheezing.    Gastrointestinal:  Negative for heartburn.   Musculoskeletal:  Negative for muscle ache.   Skin:  Negative for rash.   Allergic/Immunologic: Positive for environmental allergies.   Neurological:  Positive for headaches.      Objective:     Physical Exam   HENT:   Head: Normocephalic.   Ears:   Right Ear: Tympanic membrane normal.   Left Ear: Tympanic " membrane normal.   Nose: Nose normal.   Mouth/Throat: Mucous membranes are moist.   Cardiovascular: Normal rate and normal pulses.   Pulmonary/Chest: No stridor. No respiratory distress. She has no wheezes. She has no rhonchi.   Abdominal: Normal appearance.   Neurological: She is alert.   Vitals reviewed.      Assessment:     1. Bronchitis    2. Cough, unspecified type        Here with cough and difficulty breathing. She was given an albuterol breathing treatment with relied. Chest xray was negative for acute infiltrate. She is suffering from bronchitis. She was given solumedrol injection in the clinic. I will discharge with a nebulizer for home use and promethazine DM for cough.     Plan:       Bronchitis  -     NEBULIZER FOR HOME USE  -     methylPREDNISolone sod suc(PF) injection 125 mg  -     albuterol (PROVENTIL) 2.5 mg /3 mL (0.083 %) nebulizer solution; Take 3 mLs (2.5 mg total) by nebulization every 6 (six) hours as needed for Wheezing. Rescue  Dispense: 3 each; Refill: 0  -     promethazine-dextromethorphan (PROMETHAZINE-DM) 6.25-15 mg/5 mL Syrp; Take 5 mLs by mouth every 4 (four) hours as needed.  Dispense: 118 mL; Refill: 0    Cough, unspecified type  -     SARS Coronavirus 2 Antigen, POCT Manual Read  -     albuterol nebulizer solution 2.5 mg  -     XR CHEST PA AND LATERAL; Future; Expected date: 01/29/2024

## 2024-02-17 ENCOUNTER — OFFICE VISIT (OUTPATIENT)
Dept: URGENT CARE | Facility: CLINIC | Age: 54
End: 2024-02-17
Payer: COMMERCIAL

## 2024-02-17 VITALS
SYSTOLIC BLOOD PRESSURE: 120 MMHG | HEART RATE: 76 BPM | OXYGEN SATURATION: 100 % | WEIGHT: 170 LBS | RESPIRATION RATE: 18 BRPM | TEMPERATURE: 99 F | BODY MASS INDEX: 28.32 KG/M2 | HEIGHT: 65 IN | DIASTOLIC BLOOD PRESSURE: 68 MMHG

## 2024-02-17 DIAGNOSIS — B96.89 ACUTE BACTERIAL RHINOSINUSITIS: Primary | ICD-10-CM

## 2024-02-17 DIAGNOSIS — J01.90 ACUTE BACTERIAL RHINOSINUSITIS: Primary | ICD-10-CM

## 2024-02-17 LAB
CTP QC/QA: YES
SARS-COV-2 AG RESP QL IA.RAPID: NEGATIVE

## 2024-02-17 PROCEDURE — 87811 SARS-COV-2 COVID19 W/OPTIC: CPT | Mod: QW,S$GLB,, | Performed by: PHYSICIAN ASSISTANT

## 2024-02-17 PROCEDURE — 99214 OFFICE O/P EST MOD 30 MIN: CPT | Mod: S$GLB,,, | Performed by: PHYSICIAN ASSISTANT

## 2024-02-17 RX ORDER — AZITHROMYCIN 250 MG/1
TABLET, FILM COATED ORAL
Qty: 6 TABLET | Refills: 0 | Status: SHIPPED | OUTPATIENT
Start: 2024-02-17

## 2024-02-17 NOTE — PATIENT INSTRUCTIONS
TREATMENT:    Take antibiotic medicine prescribed until it is all gone, even if you are feeling better after a few days.  Rest. Dont let yourself get overly tired when you go back to your activities.  Stay away from cigarette smoke - yours or other peoples.  You may use acetaminophen or ibuprofen to control fever or pain,   Your appetite may be poor, so a light diet is fine.  Drink 6 to 8 glasses of fluids every day to make sure you are getting enough fluids.       SORE THROAT:    You may gargle with hot salt water 4 times a day for the next 2 days and then you may also gargle diluted hydrogen peroxide once to twice daily to alleviate some of your throat discomfort.  Drink plenty of fluids, recommend warm tea with honey.     YOU MAY USE OVER-THE-COUNTER CEPACOL FOR SOOTHING OF YOUR THROAT.  You may wish to avoid spicy food, citrus fruits, and red sauces- as this may irritate the throat more.    You can also take a daily anti-histamine such as Zyrtec, Claritin, Xyzal, OR Allegra-IN DAYTIME; NON DROWSY) AND/OR Benadryl- AT NIGHT; DROWSY) to help with runny nose/sneezing/sore throat/cough. Remember to switch antihistamines every 3 months, if taken daily.     COUGH:      Make sure you are getting rest and drinking lots of fluids.    You can use cough drops (recommend ricola lemon mint honey) or Cepacol to soothe your sore throat.     You can also take Elderberry and/or Emergen-C (vitamin C) to help boost your immune system.     You may use any of the over-the-counter cough suppressant combination medications such as: NyQuil, DayQuil, Mucinex (guaifenesin), Robitussin, Delsym (Bromfed), TheraFlu  Note:   -pseudoephedrine (behind the counter) is a decongestant. Pseudoephedrine 30 mg up to 240 mg/day. *BE AWARE- It can raise your blood pressure and give you palpitations.  -Mucinex (guaifenisin) is to break up mucus up to 2400mg/day to loosen any mucous.   -Mucinex DM pill has a cough suppressant that can be sedating. It  can be used at night to stop the tickle at the back of your throat.  -Mucinex D (it has guaifenesin and a high dose of pseudoephedrine) which could be helpful in the mornings to help decongest.  -Nyquil at night is beneficial to help you get some rest, however it is sedating and it does have an antihistamine and tylenol.  -- you may use over-the-counter Coricidin HBP in the event that you have a history of high blood pressure    Honey is a natural cough suppressant that can be used.    If your symptoms do not improve, you should return to this clinic. If your symptoms worsen, go to the emergency room.         CONGESTION:  Make sure to stay well hydrated.    Use Nasal Saline to mechanically move any post nasal drip from your eustachian tube or from the back of your throat.    You may insert a whole garlic cloves into your nostrils and leave for 10-15 minutes. When you remove them, mucus will be pulled down. This may burn as garlic is strong.  Repeat as often as needed and able to tolerate.  Please do not use garlic if you have an allergy to garlic.      PAIN/DISCOMFORT:  Tylenol up to 4,000 mg a day is safe for short periods and can be used for headache, body aches, pain, and fever. However in high doses and prolonged use it can cause liver irritation.    Ibuprofen is a non-steroidal anti-inflammatory that can be used for headache, body aches, pain, and fever. However it can also cause stomach irritation if over used.    - You must understand that you have received an Urgent Care treatment only and that you may be released before all of your medical problems are known or treated.   - You, the patient, will arrange for follow up care as instructed with your primary care provider or recommended specialist.   - If your condition worsens or fails to improve we recommend that you receive another evaluation at the ER immediately or contact your PCP to discuss your concerns, or return here.   - Please do not drive or make  any important decisions for 24 hours if you have received any pain medications, sedatives or mood altering drugs during your visit.    Disclaimer: This document was drafted with the use of a voice recognition device and is likely to have sound alike errors.

## 2024-02-17 NOTE — PROGRESS NOTES
"Subjective:      Patient ID: Bing Mclaughlin is a 53 y.o. female.    Vitals:  height is 5' 5" (1.651 m) and weight is 77.1 kg (170 lb). Her oral temperature is 98.7 °F (37.1 °C). Her blood pressure is 120/68 and her pulse is 76. Her respiration is 18 and oxygen saturation is 100%.     Chief Complaint: Cough    Patient presents with dry cough, congestion, fever (101 last night), headache that began Wednesday. Her fever began yesterday and was off and on all day and broke this morning. She recently returned from a trip to [place] on Tuesday. She was recently treated for Bronchitis. She has been taking OTC Advil and her left over Promethazine-DM, nebulizer, and inhaler.    Cough  This is a new problem. The current episode started in the past 7 days. The problem has been gradually worsening. The cough is Non-productive. Associated symptoms include chills, ear congestion, headaches, nasal congestion, rhinorrhea, a sore throat and sweats. Pertinent negatives include no chest pain, ear pain, fever, heartburn, hemoptysis, myalgias, postnasal drip, rash, shortness of breath, weight loss or wheezing. The symptoms are aggravated by lying down. Risk factors for lung disease include travel. She has tried a beta-agonist inhaler, prescription cough suppressant and rest for the symptoms. Her past medical history is significant for bronchitis, environmental allergies and pneumonia. There is no history of asthma, bronchiectasis, COPD or emphysema.       Constitution: Positive for chills. Negative for fever.   HENT:  Positive for sore throat. Negative for ear pain and postnasal drip.    Cardiovascular:  Negative for chest pain.   Respiratory:  Positive for cough. Negative for bloody sputum, shortness of breath and wheezing.    Gastrointestinal:  Negative for heartburn.   Musculoskeletal:  Negative for muscle ache.   Skin:  Negative for rash and erythema.   Allergic/Immunologic: Positive for environmental allergies.   Neurological:  " "Positive for headaches.      Objective:     Vitals:    02/17/24 1417   BP: 120/68   BP Location: Left arm   Patient Position: Sitting   BP Method: X-Large (Automatic)   Pulse: 76   Resp: 18   Temp: 98.7 °F (37.1 °C)   TempSrc: Oral   SpO2: 100%   Weight: 77.1 kg (170 lb)   Height: 5' 5" (1.651 m)     Physical Exam   Constitutional: She is oriented to person, place, and time. She appears well-developed. She is cooperative.  Non-toxic appearance. No distress. awake  HENT:   Head: Normocephalic and atraumatic. Head is without raccoon's eyes and without Arora's sign.   Ears:   Right Ear: Hearing and external ear normal. No no drainage, swelling or tenderness. Tympanic membrane is erythematous. Tympanic membrane is not bulging. A middle ear effusion is present. No decreased hearing is noted. impacted cerumen  Left Ear: Hearing and external ear normal. No no drainage, swelling or tenderness. Tympanic membrane is erythematous. Tympanic membrane is not bulging. A middle ear effusion is present. No decreased hearing is noted. impacted cerumen  Nose: Rhinorrhea and congestion present. No mucosal edema, purulent discharge or nasal deformity. No epistaxis. Right sinus exhibits maxillary sinus tenderness and frontal sinus tenderness. Left sinus exhibits maxillary sinus tenderness and frontal sinus tenderness.   Mouth/Throat: Uvula is midline and mucous membranes are normal. Mucous membranes are moist. No oral lesions. No trismus in the jaw. Normal dentition. No uvula swelling. Posterior oropharyngeal erythema present. No oropharyngeal exudate, posterior oropharyngeal edema or tonsillar abscesses. Cobblestoning: mild.Tonsils are 1+ on the right. Tonsils are 1+ on the left. No tonsillar exudate. Oropharynx is clear.      Comments: PND    Eyes: Conjunctivae and lids are normal. Pupils are equal, round, and reactive to light. Right eye exhibits no discharge. Left eye exhibits no discharge. No scleral icterus. Right eye exhibits " normal extraocular motion and no nystagmus. Left eye exhibits normal extraocular motion and no nystagmus. Extraocular movement intact vision grossly intact gaze aligned appropriately periorbital hyperpigmentation      Comments: Watery eyes bilat   Neck: Trachea normal and phonation normal. Neck supple. No Brudzinski's sign and no Kernig's sign noted. No neck rigidity present. muscular tenderness (mild) present.   Cardiovascular: Normal rate, regular rhythm, S1 normal, S2 normal, normal heart sounds and normal pulses. Exam reveals no decreased pulses.   Pulmonary/Chest: Effort normal and breath sounds normal. No accessory muscle usage or stridor. No tachypnea. No respiratory distress. She has no decreased breath sounds. She has no wheezes. She has no rhonchi. She has no rales. She exhibits no tenderness, no crepitus and no swelling.   Transmitted upper airway sound cleared with coughing         Comments: Coughing and Transmitted upper airway sound cleared with coughing    Abdominal: Normal appearance and bowel sounds are normal. She exhibits no distension. Soft.   Musculoskeletal: Normal range of motion.         General: No swelling, tenderness, deformity or signs of injury. Normal range of motion.      Right lower leg: No edema.      Left lower leg: No edema.   Neurological: no focal deficit. She is alert, oriented to person, place, and time and at baseline. She displays facial symmetry and no dysarthria. No cranial nerve deficit.   Skin: Skin is warm, dry, intact, not diaphoretic, not pale and no rash. Capillary refill takes less than 2 seconds. No erythema and No lesion   Psychiatric: She experiences Normal attention and Normal perception. Her speech is normal and behavior is normal. Mood, memory, affect, judgment and thought content normal. Cognition normal  Nursing note and vitals reviewed.      Assessment:     1. Acute bacterial rhinosinusitis      Results for orders placed or performed in visit on 02/17/24    SARS Coronavirus 2 Antigen, POCT Manual Read   Result Value Ref Range    SARS Coronavirus 2 Antigen Negative Negative     Acceptable Yes        Plan:       Acute bacterial rhinosinusitis  -     SARS Coronavirus 2 Antigen, POCT Manual Read  -     azithromycin (Z-ABHAY) 250 MG tablet; Take 2 tablets by mouth on day 1; Take 1 tablet by mouth on days 2-5  Dispense: 6 tablet; Refill: 0          Medical Decision Making:   Initial Assessment:   VSS  Clinical Tests:   Lab Tests: Ordered and Reviewed  Urgent Care Management:     - Educated patient regarding medications for symptomatic relief (outlined below).  - Strict ED precautions given for any emergent symptoms.      I have discussed the diagnosis, treatment plan and recommendations for follow-up with primary care, and patient/guardian verbalized understanding and is agreeable to the plan.   AVS printed and given to patient/guardian upon discharge with information regarding this visit. All questions were addressed prior to discharge.             Patient Instructions   TREATMENT:    Take antibiotic medicine prescribed until it is all gone, even if you are feeling better after a few days.  Rest. Dont let yourself get overly tired when you go back to your activities.  Stay away from cigarette smoke - yours or other peoples.  You may use acetaminophen or ibuprofen to control fever or pain,   Your appetite may be poor, so a light diet is fine.  Drink 6 to 8 glasses of fluids every day to make sure you are getting enough fluids.       SORE THROAT:    You may gargle with hot salt water 4 times a day for the next 2 days and then you may also gargle diluted hydrogen peroxide once to twice daily to alleviate some of your throat discomfort.  Drink plenty of fluids, recommend warm tea with honey.     YOU MAY USE OVER-THE-COUNTER CEPACOL FOR SOOTHING OF YOUR THROAT.  You may wish to avoid spicy food, citrus fruits, and red sauces- as this may irritate the throat  more.    You can also take a daily anti-histamine such as Zyrtec, Claritin, Xyzal, OR Allegra-IN DAYTIME; NON DROWSY) AND/OR Benadryl- AT NIGHT; DROWSY) to help with runny nose/sneezing/sore throat/cough. Remember to switch antihistamines every 3 months, if taken daily.     COUGH:      Make sure you are getting rest and drinking lots of fluids.    You can use cough drops (recommend ricola lemon mint honey) or Cepacol to soothe your sore throat.     You can also take Elderberry and/or Emergen-C (vitamin C) to help boost your immune system.     You may use any of the over-the-counter cough suppressant combination medications such as: NyQuil, DayQuil, Mucinex (guaifenesin), Robitussin, Delsym (Bromfed), TheraFlu  Note:   -pseudoephedrine (behind the counter) is a decongestant. Pseudoephedrine 30 mg up to 240 mg/day. *BE AWARE- It can raise your blood pressure and give you palpitations.  -Mucinex (guaifenisin) is to break up mucus up to 2400mg/day to loosen any mucous.   -Mucinex DM pill has a cough suppressant that can be sedating. It can be used at night to stop the tickle at the back of your throat.  -Mucinex D (it has guaifenesin and a high dose of pseudoephedrine) which could be helpful in the mornings to help decongest.  -Nyquil at night is beneficial to help you get some rest, however it is sedating and it does have an antihistamine and tylenol.  -- you may use over-the-counter Coricidin HBP in the event that you have a history of high blood pressure    Honey is a natural cough suppressant that can be used.    If your symptoms do not improve, you should return to this clinic. If your symptoms worsen, go to the emergency room.         CONGESTION:  Make sure to stay well hydrated.    Use Nasal Saline to mechanically move any post nasal drip from your eustachian tube or from the back of your throat.    You may insert a whole garlic cloves into your nostrils and leave for 10-15 minutes. When you remove them, mucus  will be pulled down. This may burn as garlic is strong.  Repeat as often as needed and able to tolerate.  Please do not use garlic if you have an allergy to garlic.      PAIN/DISCOMFORT:  Tylenol up to 4,000 mg a day is safe for short periods and can be used for headache, body aches, pain, and fever. However in high doses and prolonged use it can cause liver irritation.    Ibuprofen is a non-steroidal anti-inflammatory that can be used for headache, body aches, pain, and fever. However it can also cause stomach irritation if over used.    - You must understand that you have received an Urgent Care treatment only and that you may be released before all of your medical problems are known or treated.   - You, the patient, will arrange for follow up care as instructed with your primary care provider or recommended specialist.   - If your condition worsens or fails to improve we recommend that you receive another evaluation at the ER immediately or contact your PCP to discuss your concerns, or return here.   - Please do not drive or make any important decisions for 24 hours if you have received any pain medications, sedatives or mood altering drugs during your visit.    Disclaimer: This document was drafted with the use of a voice recognition device and is likely to have sound alike errors.

## 2024-02-17 NOTE — LETTER
[id number] [facility], [address]  [place] [address]  Phone: [phone]          Return to Work/School    Patient: Bing Mclaughlin  YOB: 1970   Date: 02/17/2024     To Whom It May Concern:     Bing Mclaughlin was in contact with/seen in my office on 02/17/2024. COVID-19 is present in our communities across the state. There is limited testing for COVID at this time, so not all patients can be tested. In this situation, your employee meets the following criteria:     Bing Mclaughlin has met the criteria for COVID-19 testing and has a NEGATIVE result. The employee can return to work once they are asymptomatic for 24 hours without the use of fever reducing medications (Tylenol, Motrin, etc).     If you have any questions or concerns, or if I can be of further assistance, please do not hesitate to contact me.     Sincerely,    Kriss Morillo PA-C

## (undated) DEVICE — SOL ELECTROLUBE ANTI-STIC

## (undated) DEVICE — SEE MEDLINE ITEM 152622

## (undated) DEVICE — SEAL UNIVERSAL 5MM-8MM XI

## (undated) DEVICE — Device

## (undated) DEVICE — SEE MEDLINE ITEM 157027

## (undated) DEVICE — SCISSOR CURVED ENDOPATH 5MM

## (undated) DEVICE — DRAPE COLUMN DAVINCI XI

## (undated) DEVICE — SYR 50CC LL

## (undated) DEVICE — COVER OVERHEAD SURG LT BLUE

## (undated) DEVICE — SEE MEDLINE ITEM 157117

## (undated) DEVICE — TIP RUMI GREEN DISPOSABLE6.7MM

## (undated) DEVICE — SUPPORT ULNA NERVE PROTECTOR

## (undated) DEVICE — KIT ANTIFOG

## (undated) DEVICE — OBTURATOR BLADELESS 8MM XI

## (undated) DEVICE — SEE MEDLINE ITEM 157181

## (undated) DEVICE — TROCAR ENDOPATH XCEL 5X100MM

## (undated) DEVICE — NDL PNEUMO INSUFFLATI 120MM

## (undated) DEVICE — DRAPE ARM DAVINCI XI

## (undated) DEVICE — SEE MEDLINE ITEM 146372

## (undated) DEVICE — GLOVE SURGICAL LATEX SZ 6.5

## (undated) DEVICE — COVER TIP CURVED SCISSORS XI

## (undated) DEVICE — APPLICATOR CHLORAPREP ORN 26ML

## (undated) DEVICE — SEE MEDLINE ITEM 154981

## (undated) DEVICE — GLOVE SURGICAL LATEX SZ 7

## (undated) DEVICE — OCCLUDER COLPO-PNEUMO STERILE

## (undated) DEVICE — ADHESIVE DERMABOND ADVANCED

## (undated) DEVICE — SUT ABS CLIP LAPRA-TY CTD

## (undated) DEVICE — SYR 3CC LUER LOC

## (undated) DEVICE — POSITIONER HEAD DONUT 9IN FOAM

## (undated) DEVICE — DRAPE STERI LONG

## (undated) DEVICE — ELECTRODE REM PLYHSV RETURN 9

## (undated) DEVICE — IRRIGATOR ENDOSCOPY DISP.

## (undated) DEVICE — SYR 10CC LUER LOCK

## (undated) DEVICE — EVACUATOR KIT SMOKE PLUME AWAY

## (undated) DEVICE — SOL 9P NACL IRR PIC IL

## (undated) DEVICE — TUBING HEATED INSUFFLATOR

## (undated) DEVICE — SOL NS 1000CC

## (undated) DEVICE — DRAPE LAVH LAPAROSCOPY W/FLUID

## (undated) DEVICE — SUT CTD VICRYL 0 UND BR SUT

## (undated) DEVICE — SUT CTD VICRYL PLUS 4/0

## (undated) DEVICE — SEE MEDLINE ITEM 146292